# Patient Record
Sex: FEMALE | Race: WHITE | Employment: FULL TIME | URBAN - METROPOLITAN AREA
[De-identification: names, ages, dates, MRNs, and addresses within clinical notes are randomized per-mention and may not be internally consistent; named-entity substitution may affect disease eponyms.]

---

## 2017-03-27 RX ORDER — TOPIRAMATE 25 MG/1
TABLET ORAL
Qty: 210 TAB | Refills: 2 | Status: SHIPPED | OUTPATIENT
Start: 2017-03-27 | End: 2020-07-07 | Stop reason: SINTOL

## 2017-04-20 DIAGNOSIS — G43.711 INTRACTABLE CHRONIC MIGRAINE WITHOUT AURA AND WITH STATUS MIGRAINOSUS: ICD-10-CM

## 2017-04-20 RX ORDER — SUMATRIPTAN 100 MG/1
TABLET, FILM COATED ORAL
Qty: 15 TAB | Refills: 0 | Status: SHIPPED | OUTPATIENT
Start: 2017-04-20 | End: 2017-06-15 | Stop reason: SDUPTHER

## 2017-06-15 DIAGNOSIS — G43.711 INTRACTABLE CHRONIC MIGRAINE WITHOUT AURA AND WITH STATUS MIGRAINOSUS: ICD-10-CM

## 2017-06-16 RX ORDER — SUMATRIPTAN 100 MG/1
TABLET, FILM COATED ORAL
Qty: 15 TAB | Refills: 0 | Status: SHIPPED | OUTPATIENT
Start: 2017-06-16 | End: 2017-11-12 | Stop reason: SDUPTHER

## 2017-11-12 DIAGNOSIS — G43.711 INTRACTABLE CHRONIC MIGRAINE WITHOUT AURA AND WITH STATUS MIGRAINOSUS: ICD-10-CM

## 2017-11-13 RX ORDER — SUMATRIPTAN 100 MG/1
TABLET, FILM COATED ORAL
Qty: 15 TAB | Refills: 0 | Status: SHIPPED | OUTPATIENT
Start: 2017-11-13 | End: 2018-04-02 | Stop reason: SDUPTHER

## 2018-04-02 DIAGNOSIS — G43.711 INTRACTABLE CHRONIC MIGRAINE WITHOUT AURA AND WITH STATUS MIGRAINOSUS: ICD-10-CM

## 2018-04-02 RX ORDER — SUMATRIPTAN 100 MG/1
TABLET, FILM COATED ORAL
Qty: 15 TAB | Refills: 0 | Status: SHIPPED | OUTPATIENT
Start: 2018-04-02 | End: 2020-07-07

## 2019-03-05 ENCOUNTER — ED HISTORICAL/CONVERTED ENCOUNTER (OUTPATIENT)
Dept: OTHER | Age: 44
End: 2019-03-05

## 2019-10-08 ENCOUNTER — OP HISTORICAL/CONVERTED ENCOUNTER (OUTPATIENT)
Dept: OTHER | Age: 44
End: 2019-10-08

## 2020-05-20 ENCOUNTER — TELEPHONE (OUTPATIENT)
Dept: FAMILY MEDICINE CLINIC | Age: 45
End: 2020-05-20

## 2020-05-20 ENCOUNTER — VIRTUAL VISIT (OUTPATIENT)
Dept: FAMILY MEDICINE CLINIC | Age: 45
End: 2020-05-20

## 2020-05-20 VITALS — BODY MASS INDEX: 26.46 KG/M2 | WEIGHT: 155 LBS | HEIGHT: 64 IN

## 2020-05-20 DIAGNOSIS — I10 ESSENTIAL HYPERTENSION: ICD-10-CM

## 2020-05-20 DIAGNOSIS — R25.1 TREMOR: Primary | ICD-10-CM

## 2020-05-20 DIAGNOSIS — R00.2 PALPITATIONS: ICD-10-CM

## 2020-05-20 PROBLEM — E11.9 CONTROLLED TYPE 2 DIABETES MELLITUS WITHOUT COMPLICATION, WITHOUT LONG-TERM CURRENT USE OF INSULIN (HCC): Status: ACTIVE | Noted: 2020-05-20

## 2020-05-20 PROBLEM — F41.9 ANXIETY: Status: ACTIVE | Noted: 2020-05-20

## 2020-05-20 PROBLEM — G43.909 MIGRAINE WITHOUT STATUS MIGRAINOSUS, NOT INTRACTABLE: Status: ACTIVE | Noted: 2020-05-20

## 2020-05-20 PROBLEM — E78.49 OTHER HYPERLIPIDEMIA: Status: ACTIVE | Noted: 2020-05-20

## 2020-05-20 PROBLEM — K31.84 GASTROPARESIS: Status: ACTIVE | Noted: 2020-05-20

## 2020-05-20 RX ORDER — ATENOLOL 50 MG/1
50 TABLET ORAL DAILY
Qty: 90 TAB | Refills: 0 | Status: SHIPPED | OUTPATIENT
Start: 2020-05-20 | End: 2020-06-10

## 2020-05-20 NOTE — PROGRESS NOTES
Consent: Nany Hagan, who was seen by synchronous (real-time) audio-video technology, and/or her healthcare decision maker, is aware that this patient-initiated, Telehealth encounter on 5/20/2020 is a billable service, with coverage as determined by her insurance carrier. She is aware that she may receive a bill and has provided verbal consent to proceed: Yes. Assessment & Plan:   Diagnoses and all orders for this visit:    1. Tremor  -     REFERRAL TO NEUROLOGY    2. Essential hypertension  -     atenoloL (TENORMIN) 50 mg tablet; Take 1 Tab by mouth daily. 3. Palpitations  -     atenoloL (TENORMIN) 50 mg tablet; Take 1 Tab by mouth daily.  -     REFERRAL TO CARDIOLOGY      We will request records from previous PCP. Ms. Denice Randolhp has been given the following recommendations today due to her elevated BP reading: rescreen BP within a minimum of 2 weeks, lifestyle modifications to include: dietary sodium restriction and increase physical activity and anti-hypertensive pharmacologic therapy ordered. Follow-up and Dispositions    · Return in about 2 weeks (around 6/3/2020) for follow up, BP. I spent at least 15 minutes with this established patient, and >50% of the time was spent counseling and/or coordinating care regarding htn, palpitations  712  Subjective:   Nany Hagan is a 50 y.o. female who was seen for [address] and Hypertension (Blood pressure is running high)      1. Tremor  Reports tremor in both hands for past 4-5 months. Denies focal weakness, numbness, tingling. Has seen a neurologist in the past but does not want to follow up there. No treatments tried. Requests referral.    2. Essential hypertension  The patient presents today for HTN evaluation. Not on any BP medications at this time. Home BP readings range from systolic: 230 s 264 s/ diastolic 90 s -662 s. SIde effects of meds:  none  Patient trying to follow low salt diet. Associated symptoms include: palpitations.  No chest pain. Prior to Admission medications    Medication Sig Start Date End Date Taking? Authorizing Provider   esomeprazole magnesium (NEXIUM PO) Take 20 mg by mouth two (2) times a day. Yes Provider, Historical   linaclotide (LINZESS PO) Take  by mouth. Yes Provider, Historical   atenoloL (TENORMIN) 50 mg tablet Take 1 Tab by mouth daily. 5/20/20  Yes Renetta Wright MD     Allergies   Allergen Reactions    Morphine Unknown (comments)       Patient Active Problem List   Diagnosis Code    Migraine without status migrainosus, not intractable G43.909    Anxiety F41.9    Gastroparesis K31.84    Other hyperlipidemia E78.49    Controlled type 2 diabetes mellitus without complication, without long-term current use of insulin ([facility]) E11.9     Patient Active Problem List    Diagnosis Date Noted    Migraine without status migrainosus, not intractable 05/20/2020    Anxiety 05/20/2020    Gastroparesis 05/20/2020    Other hyperlipidemia 05/20/2020    Controlled type 2 diabetes mellitus without complication, without long-term current use of insulin ([facility] 05/20/2020     Current Outpatient Medications   Medication Sig Dispense Refill    esomeprazole magnesium (NEXIUM PO) Take 20 mg by mouth two (2) times a day.  linaclotide (LINZESS PO) Take  by mouth.  atenoloL (TENORMIN) 50 mg tablet Take 1 Tab by mouth daily. 90 Tab 0     Allergies   Allergen Reactions    Morphine Unknown (comments)     No past medical history on file. Past Surgical History:   Procedure Laterality Date    HX GASTRIC BYPASS  2015     No family history on file. Social History     Tobacco Use    Smoking status: Current Some Day Smoker    Smokeless tobacco: Never Used   Substance Use Topics    Alcohol use: Not Currently       Review of Systems   Constitutional: Negative. HENT: Negative. Eyes: Negative. Respiratory: Negative. Cardiovascular: Positive for palpitations. Gastrointestinal: Negative.     Genitourinary: Negative. Musculoskeletal: Negative. Skin: Negative. Neurological: Negative. Endo/Heme/Allergies: Negative. Psychiatric/Behavioral: Negative. Objective:   Vital Signs: (As obtained by patient/caregiver at home)  Visit Vitals  Ht 5' 4\" (1.626 m)   Wt 155 lb (70.3 kg)   LMP 10/20/2019   BMI 26.61 kg/m²        [INSTRUCTIONS:  \"[x]\" Indicates a positive item  \"[]\" Indicates a negative item  -- DELETE ALL ITEMS NOT EXAMINED]    Constitutional: [x] Appears well-developed and well-nourished [x] No apparent distress      [] Abnormal -     Mental status: [x] Alert and awake  [x] Oriented to person/place/time [x] Able to follow commands    [] Abnormal -     Eyes:   EOM    [x]  Normal    [] Abnormal -   Sclera  [x]  Normal    [] Abnormal -          Discharge [x]  None visible   [] Abnormal -     HENT: [x] Normocephalic, atraumatic  [] Abnormal -   [x] Mouth/Throat: Mucous membranes are moist    External Ears [x] Normal  [] Abnormal -    Neck: [x] No visualized mass [] Abnormal -     Pulmonary/Chest: [x] Respiratory effort normal   [x] No visualized signs of difficulty breathing or respiratory distress        [] Abnormal -      Musculoskeletal:   [x] Normal gait with no signs of ataxia         [x] Normal range of motion of neck        [] Abnormal -     Neurological:        [x] No Facial Asymmetry (Cranial nerve 7 motor function) (limited exam due to video visit)          [x] No gaze palsy        [] Abnormal -          Skin:        [x] No significant exanthematous lesions or discoloration noted on facial skin         [] Abnormal -            Psychiatric:       [x] Normal Affect [] Abnormal -        [x] No Hallucinations    Other pertinent observable physical exam findings:-        We discussed the expected course, resolution and complications of the diagnosis(es) in detail. Medication risks, benefits, costs, interactions, and alternatives were discussed as indicated.   I advised her to contact the office if her condition worsens, changes or fails to improve as anticipated. She expressed understanding with the diagnosis(es) and plan. Sapphire Leos is a 50 y.o. female being evaluated by a video visit encounter for concerns as above. A caregiver was present when appropriate. Due to this being a TeleHealth encounter (During Replaced by [facility] public health emergency), evaluation of the following organ systems was limited: Vitals/Constitutional/EENT/Resp/CV/GI//MS/Neuro/Skin/Heme-Lymph-Imm. Pursuant to the emergency declaration under the [facility] waiver authority and the [employer] Act, this Virtual  Visit was conducted, with patient's (and/or legal guardian's) consent, to reduce the patient's risk of exposure to COVID-19 and provide necessary medical care. Services were provided through a video synchronous discussion virtually to substitute for in-person clinic visit. Patient was located at her home. I was at home while conducting this encounter.        Navin Bustos MD

## 2020-05-20 NOTE — PROGRESS NOTES
Ga Martinez is a 50 y.o. female      Chief Complaint   Patient presents with    Establish Care    Hypertension     Blood pressure is running high         1. Have you been to the ER, urgent care clinic since your last visit? Hospitalized since your last visit? no      2. Have you seen or consulted any other health care providers outside of the [address] [place] [address] since your last visit? Include any pap smears or colon screening.    Yes Dr Amy Colemna

## 2020-05-21 ENCOUNTER — TELEPHONE (OUTPATIENT)
Dept: CARDIOLOGY CLINIC | Age: 45
End: 2020-05-21

## 2020-05-21 NOTE — TELEPHONE ENCOUNTER
Patient was referred to Dr Elizabeth Toro.   Ref by Dr Kala Castellon [phone]  Dx: hypertension, high heart rate     Phone: [phone]

## 2020-06-05 ENCOUNTER — OFFICE VISIT (OUTPATIENT)
Dept: CARDIOLOGY CLINIC | Age: 45
End: 2020-06-05

## 2020-06-05 VITALS
DIASTOLIC BLOOD PRESSURE: 88 MMHG | BODY MASS INDEX: 31.24 KG/M2 | HEART RATE: 76 BPM | HEIGHT: 64 IN | SYSTOLIC BLOOD PRESSURE: 146 MMHG | WEIGHT: 183 LBS | OXYGEN SATURATION: 98 %

## 2020-06-05 DIAGNOSIS — E78.49 OTHER HYPERLIPIDEMIA: ICD-10-CM

## 2020-06-05 DIAGNOSIS — R00.0 ELEVATED PULSE RATE: Primary | ICD-10-CM

## 2020-06-05 DIAGNOSIS — I10 HYPERTENSION, UNSPECIFIED TYPE: Primary | ICD-10-CM

## 2020-06-05 DIAGNOSIS — I10 HYPERTENSION, UNSPECIFIED TYPE: ICD-10-CM

## 2020-06-05 DIAGNOSIS — E11.9 CONTROLLED TYPE 2 DIABETES MELLITUS WITHOUT COMPLICATION, WITHOUT LONG-TERM CURRENT USE OF INSULIN (HCC): ICD-10-CM

## 2020-06-05 DIAGNOSIS — R00.0 ELEVATED PULSE RATE: ICD-10-CM

## 2020-06-05 DIAGNOSIS — R00.0 TACHYCARDIA: ICD-10-CM

## 2020-06-05 RX ORDER — AMLODIPINE BESYLATE 5 MG/1
5 TABLET ORAL DAILY
Qty: 30 TAB | Refills: 4 | Status: SHIPPED | OUTPATIENT
Start: 2020-06-05 | End: 2020-07-08

## 2020-06-05 RX ORDER — TRAMADOL HYDROCHLORIDE 50 MG/1
50 TABLET ORAL
COMMUNITY
End: 2020-07-07

## 2020-06-05 NOTE — PATIENT INSTRUCTIONS
#1) we will perform a Lexiscan Cardiolite to look for any ischemia 2) echocardiogram to look at LV function 3) begin Norvasc 5 mg a day to take in the evening. Follow blood pressure at home should it remain persistently above 140/90 call 4) we will obtain a sleep evaluation for possible sleep apnea 5) we will obtain a cholesterol profile 6) 48 hotter monitor for rhythm issues 7) follow-up with me in 6 weeks

## 2020-06-05 NOTE — PROGRESS NOTES
ICD-10-CM ICD-9-CM   1. Hypertension, unspecified type I10 401.9   2. Tachycardia R00.0 785.0   3. Elevated pulse rate R00.0 785.0   4. Controlled type 2 diabetes mellitus without complication, without long-term current use of insulin (HCC) E11.9 250.00   5. Other hyperlipidemia E78.49 272.4            Anupama Maldonado is a 50 y.o. female with  referred for hypertension, chest discomfort, tachycardia. The patient denies chest pain/ shortness of breath, orthopnea, PND, LE edema, palpitations, syncope, presyncope or fatigue. Cardiac risk factors: dyslipidemia, obesity, hypertension, stress, post-menopausal  I have personally obtained the history from the patient. HISTORY OF PRESENTING ILLNESS      Ms. Keyla Silverio is a pleasant lady who has been under significant amount of stress in the past.  She has gained about 30 pounds over the years and back in 2015 and saw a cardiologist and had a work-up that was negative including a negative cardiac catheterization. More recently she has had issues with hypertension. Back in 2015 when she weight loss her blood pressure was good. She was placed on atenolol for her hypertension but she refused to take it. ATTENTION:   This medical record was transcribed using an electronic medical records/speech recognition system. Although proofread, it may and can contain electronic, spelling and other errors. Corrections may be executed at a later time. Please feel free to contact us for any clarifications as needed.        ACTIVE PROBLEM LIST     Patient Active Problem List    Diagnosis Date Noted    Migraine without status migrainosus, not intractable 05/20/2020    Anxiety 05/20/2020    Gastroparesis 05/20/2020    Other hyperlipidemia 05/20/2020    Controlled type 2 diabetes mellitus without complication, without long-term current use of insulin ([facility] 05/20/2020    Cellulitis and abscess of neck 12/03/2016    Controlled type 2 diabetes mellitus without complication ([facility] 97/25/7675    Sepsis [facility] 12/03/2016    Elevated pulse rate 08/31/2015    Chest pain with normal coronary angiography 07/28/2015    Morbid obesity with BMI of 45.0-49.9, adult ([facility] 07/28/2015    TMJ tenderness 06/01/2015    Sinusitis 06/01/2015    New daily persistent headache 06/01/2015    Foot pain, bilateral 04/08/2015    Unspecified hereditary and idiopathic peripheral neuropathy 04/08/2015    Chronic back pain 03/13/2014    Obesity 03/13/2014    Lyme disease 03/13/2014    Anxiety 03/13/2014    Diverticular disease 03/13/2014    GERD (gastroesophageal reflux disease) 03/13/2014    SOB (shortness of breath) 03/12/2014           PAST MEDICAL HISTORY     Past Medical History:   Diagnosis Date    Arrhythmia     PAC    Arthritis     Chest pain     Diabetes ([facility]     GERD (gastroesophageal reflux disease)     H/O seasonal allergies     Ill-defined condition     \"twisted\" kidney right    Ill-defined condition     cellulitis    Lyme disease     Obesity     Plantar fasciitis     PUD (peptic ulcer disease)     Trigger finger, right index finger 2/2016    Vitamin D deficiency            PAST SURGICAL HISTORY     Past Surgical History:   Procedure Laterality Date    HX GASTRIC BYPASS  2015    HX GI      HX HEART CATHETERIZATION  7/28/15    HX ORTHOPAEDIC  7-30-15    Rt. rotator cuff repair     HX TONSILLECTOMY      UT COLSC FLX W/NDSC US XM RCTM ET AL LMTD&ADJ STRUX  2011          ALLERGIES     Allergies   Allergen Reactions    Morphine Anaphylaxis     July 5, 2016 administered after surgery for pain.  Morphine Unknown (comments)    Other Medication Palpitations     Peripheral Block patient noted difficulty breathing and palpitations.           FAMILY HISTORY     Family History   Problem Relation Age of Onset    Cancer Paternal Aunt         colon polyps    Cancer Paternal Uncle         colon cancer    Stroke Mother     Heart Disease Mother     Elevated Lipids Mother     Cancer Father         prostate    Heart Disease Father         death by MI    Coronary Artery Disease Other         mother  64, father age 67 of MI    Other Brother         Passed while sleeping at 64, neck blockages and legs, hx of vascular surg    Hypertension Brother     Diabetes Brother     Anesth Problems Neg Hx     negative for cardiac disease       SOCIAL HISTORY     Social History     Socioeconomic History    Marital status: SINGLE     Spouse name: Not on file    Number of children: Not on file    Years of education: Not on file    Highest education level: Not on file   Tobacco Use    Smoking status: Current Some Day Smoker     Packs/day: 0.50    Smokeless tobacco: Never Used   Substance and Sexual Activity    Alcohol use: Yes     Comment: rare    Drug use: Never    Sexual activity: Yes     Comment: not sexually active the past 4 years since     Social History Narrative    ** Merged History Encounter **              MEDICATIONS     Current Outpatient Medications   Medication Sig    traMADoL (ULTRAM) 50 mg tablet Take 50 mg by mouth every six (6) hours as needed for Pain.  amLODIPine (NORVASC) 5 mg tablet Take 1 Tab by mouth daily.  SUMAtriptan (IMITREX) 100 mg tablet TAKE 1 TABLET BY MOUTH 1 TIME AS NEEDED AT THE START OF THE HEADACHE. MAY REPEAT IN 2 HOURS IF THE HEADACHE PERSISTS    HYDROcodone-acetaminophen (NORCO) 5-325 mg per tablet Take 1 Tab by mouth every twelve (12) hours as needed for Pain.  cyclobenzaprine (FLEXERIL) 5 mg tablet Take 10 mg by mouth nightly as needed for Muscle Spasm(s).  Omeprazole delayed release (PRILOSEC D/R) 20 mg tablet Take 20 mg by mouth two (2) times a day.  esomeprazole magnesium (NEXIUM PO) Take 20 mg by mouth two (2) times a day.  linaclotide (LINZESS PO) Take  by mouth.  atenoloL (TENORMIN) 50 mg tablet Take 1 Tab by mouth daily.     SUMAtriptan (IMITREX) 100 mg tablet TAKE 1 TABLET BY MOUTH 1 TIME AS NEEDED AT THE START OF THE HEADACHE. MAY REPEAT IN 2 HOURS IF THE HEADACHE PERSISTS    topiramate (TOPAMAX) 25 mg tablet TAKE 1 TABLET EVERY NIGHT AT BEDTIME WEEK 1, 1 TWICE DAILY WEEK 2, 1 EVERY MORNING AND 2 EVERY NIGHT AT BEDTIME WEEK 3, THEN 2 TWICE DAILY    omeprazole (PRILOSEC) 20 mg capsule Take 20 mg by mouth daily as needed (if heartburn persists after first dose).  gabapentin (NEURONTIN) 100 mg capsule Take 400 mg by mouth daily as needed for Pain.  LORazepam (ATIVAN) 0.5 mg tablet Take 0.5 mg by mouth two (2) times daily as needed for Anxiety.  traZODone (DESYREL) 100 mg tablet Take 200 mg by mouth nightly as needed for Sleep. No current facility-administered medications for this visit. I have reviewed the nurses notes, vitals, problem list, allergy list, medical history, family, social history and medications. REVIEW OF SYMPTOMS      General: Pt denies excessive weight gain or loss. Pt is able to conduct ADL's  HEENT: Denies blurred vision, headaches, hearing loss, epistaxis and difficulty swallowing. Respiratory: Denies cough, congestion, shortness of breath, BAUER, wheezing or stridor. Cardiovascular: Denies precordial pain, palpitations, edema or PND  Gastrointestinal: Denies poor appetite, indigestion, abdominal pain or blood in stool  Genitourinary: Denies hematuria, dysuria, increased urinary frequency  Musculoskeletal: Denies joint pain or swelling from muscles or joints  Neurologic: Denies tremor, paresthesias, headache, or sensory motor disturbance  Psychiatric: Denies confusion, insomnia, depression  Integumentray: Denies rash, itching or ulcers. Hematologic: Denies easy bruising, bleeding     PHYSICAL EXAMINATION      Vitals:    06/05/20 1052   BP: 146/88   Pulse: 76   SpO2: 98%   Weight: 183 lb (83 kg)   Height: 5' 4\" (1.626 m)     General: Well developed, in no acute distress.   Appears deconditioned  HEENT: No jaundice, oral mucosa moist, no oral ulcers  Neck: Supple, no stiffness, no lymphadenopathy, supple  Heart:  Normal S1/S2 negative S3 or S4. Regular, no murmur, gallop or rub, no jugular venous distention  Respiratory: Clear bilaterally x 4, no wheezing or rales  Abdomen:   Soft, non-tender, bowel sounds are active.   Extremities:  No edema, normal cap refill, no cyanosis. Musculoskeletal: No clubbing, no deformities  Neuro: A&Ox3, speech clear, gait stable, cooperative, no focal neurologic deficits  Skin: Skin color is normal. No rashes or lesions. Non diaphoretic, moist.  Vascular: 2+ pulses symmetric in all extremities        EK2020: Normal sinus rhythm     DIAGNOSTIC DATA       1. Echo   14- EF 55-60%  7/22/15- EF 55-60%    2. Stress Test  14- Lexiscan Cardiolite- no ischemia, EF 76%  7/15/15- Lexiscan Cardiolite-Myocardial perfusion imaging is abnormal: there is a large area of fixed perfusion defect in the lateral wall with significant attenuation artifact. Overall left ventricular systolic function was normal. Compared to the study from 2014, the current study reveals new perfusion defect. 3. Holter  7/16/15- NSR    4. Cardiac Cath  7/28/15- No CAD    5.  Lipids  7/27/15- , HDL 42, ,        Lab Results   Component Value Date/Time    WBC 25.0 (H) 2016 04:02 AM    Hemoglobin (POC) 14.3 2016 08:21 AM    HGB 13.0 2016 04:02 AM    Hematocrit (POC) 42 2016 08:21 AM    HCT 37.8 2016 04:02 AM    PLATELET 514  04:02 AM    MCV 91.5 2016 04:02 AM      Lab Results   Component Value Date/Time    Sodium 137 2016 04:02 AM    Potassium 3.5 2016 04:02 AM    Chloride 104 2016 04:02 AM    CO2 24 2016 04:02 AM    Anion gap 9 2016 04:02 AM    Glucose 162 (H) 2016 04:02 AM    BUN 8 2016 04:02 AM    Creatinine 0.61 2016 04:02 AM    BUN/Creatinine ratio 13 2016 04:02 AM    GFR est AA >60 2016 04:02 AM    GFR est non-AA >60 2016 04:02 AM    Calcium 8.4 (L) 12/04/2016 04:02 AM    Bilirubin, total <0.2 07/27/2015 08:03 AM    Alk. phosphatase 79 07/27/2015 08:03 AM    Protein, total 6.6 07/27/2015 08:03 AM    Albumin 4.1 07/27/2015 08:03 AM    A-G Ratio 1.6 07/27/2015 08:03 AM    ALT (SGPT) 21 07/27/2015 08:03 AM           ASSESSMENT/RECOMMENDATIONS:.      1.  Hypertension  -Blood pressure is elevated today I encouraged her to eat a low-sodium diet and probably needs to work on stress. Seems like this is the biggest issue and she is asking for Ativan from her primary care.  -I have placed her on amlodipine at 5 mg a day we will check her blood pressure returns for cardiac testing  2. Chest discomfort  -She describes discomfort in the left upper side of her chest at times. Seems not to be with exertion or no exertion. I do favor since she does appear to be deconditioned to check echocardiogram as well as a Tye Carline was done in the past.  3.  Dyslipidemia her last LDL was in 2015 was 143  -Currently not on a statin but prior to that I think we should check a fasting lipid profile  3. Tachycardia  -We will place a 48-hour Holter monitor on her to look for any arrhythmias  4. Early morning fatigue and weight gain  -He was to go forward with a sleep evaluation she is in agreement    Follow-up with me in 6 weeks      Orders Placed This Encounter    LIPID PANEL     Standing Status:   Future     Standing Expiration Date:   6/5/2021    HEPATIC FUNCTION PANEL     Standing Status:   Future     Standing Expiration Date:   6/5/2021    AMB POC EKG ROUTINE W/ 12 LEADS, INTER & REP     Order Specific Question:   Reason for Exam:     Answer:   HTN, Tachy    traMADoL (ULTRAM) 50 mg tablet     Sig: Take 50 mg by mouth every six (6) hours as needed for Pain.  amLODIPine (NORVASC) 5 mg tablet     Sig: Take 1 Tab by mouth daily.      Dispense:  30 Tab     Refill:  4          Follow-up and Dispositions  ·   Return in about 6 months (around 12/5/2020). I have discussed the diagnosis with  Miki Lesches and the intended plan as seen in the above orders. Questions were answered concerning future plans. I have discussed medication side effects and warnings with the patient as well. Thank you,  Javier Patterson MD for involving me in the care of  Miki Lesches. Please do not hesitate to contact me for further questions/concerns. Lonny Castillo MD, [address] [place] [address] Rd[address]      [facility], [address] [place] [address], [address] [place] [address] Nita Milan.      [phone] / [phone] Fax

## 2020-06-05 NOTE — PROGRESS NOTES
Visit Vitals  /88 (BP 1 Location: Left arm, BP Patient Position: Sitting)   Pulse 76   Ht 5' 4\" (1.626 m)   Wt 183 lb (83 kg)   SpO2 98%   BMI 31.41 kg/m²         Smoker    Fam hx Dad-CAD, MI    157/95 last night   Pulse up to 100 resting  Elev a month or 2   Ringing in ears and hears heart beat in ears    Seen cardiology before: Has seen one at [facility] for same sympt.  Nuclear stress test done before, 6 years ago    Chest pain: every now and then, more so a tightness  Shortness of breath: yes  Edema: some, she eats A LOT of salt  Palpitations: racing  Dizziness: sometimes    Hasn't started atenolol, scared of dosing

## 2020-06-08 ENCOUNTER — TELEPHONE (OUTPATIENT)
Dept: NEUROLOGY | Age: 45
End: 2020-06-08

## 2020-06-08 ENCOUNTER — VIRTUAL VISIT (OUTPATIENT)
Dept: FAMILY MEDICINE CLINIC | Age: 45
End: 2020-06-08

## 2020-06-08 DIAGNOSIS — F41.9 ANXIETY: Primary | ICD-10-CM

## 2020-06-08 RX ORDER — BUSPIRONE HYDROCHLORIDE 7.5 MG/1
7.5 TABLET ORAL 3 TIMES DAILY
Qty: 90 TAB | Refills: 1 | Status: SHIPPED | OUTPATIENT
Start: 2020-06-08 | End: 2020-07-08 | Stop reason: SDUPTHER

## 2020-06-08 RX ORDER — LORAZEPAM 0.5 MG/1
0.5 TABLET ORAL
Status: CANCELLED | OUTPATIENT
Start: 2020-06-08

## 2020-06-08 NOTE — PROGRESS NOTES
Consent: Raul Madrid, who was seen by synchronous (real-time) audio-video technology, and/or her healthcare decision maker, is aware that this patient-initiated, Telehealth encounter on 6/8/2020 is a billable service, with coverage as determined by her insurance carrier. She is aware that she may receive a bill and has provided verbal consent to proceed: Yes. Assessment & Plan:   Diagnoses and all orders for this visit:    1. Anxiety  -     busPIRone (BUSPAR) 7.5 mg tablet; Take 1 Tab by mouth three (3) times daily. Indications: repeated episodes of anxiety          Follow-up and Dispositions    · Return in about 1 month (around 7/8/2020) for follow up, med check. I spent at least 15 minutes with this established patient, and >50% of the time was spent counseling and/or coordinating care regarding anxiety  712  Subjective:   Raul Madrid is a 50 y.o. female who was seen for Medication Refill (Pt requesting medication for xanax or lorazepam)      1. Anxiety  Raul Madrid is a 50 y.o. female who presents for new evaluation and treatment of of anxiety    ESTABLISHED ANXIETY DIAGNOSIS?: none   PROMINENT SYMPTOMS: palpitations, racing thoughts, feelings of losing control. TRUE PANIC ATTACKS?: occur in addition to generalized anxiety   FREQUENCY OF ANXIETY:  Daily, all the time   TRIGGERS: There are no apparent triggers that increase the anxiety. CURRENT TREATMENT: none  Reported side effects from the medication(s) above include: none. PERTINENT HISTORY: negative for bipolar disorder,  hyperthyroidism, obsessive- compulsive disorder, post traumatic stress disorder, drug use and alcohol use   FAMILY HISTORY:negative for psychiatric disorders. Prior to Admission medications    Medication Sig Start Date End Date Taking? Authorizing Provider   busPIRone (BUSPAR) 7.5 mg tablet Take 1 Tab by mouth three (3) times daily.  Indications: repeated episodes of anxiety 6/8/20  Yes Spencer Wood MD traMADoL (ULTRAM) 50 mg tablet Take 50 mg by mouth every six (6) hours as needed for Pain. Yes Provider, Historical   amLODIPine (NORVASC) 5 mg tablet Take 1 Tab by mouth daily. 6/5/20  Yes Sabi Castillo MD   linaclotide (LINZESS PO) Take  by mouth. Yes Provider, Historical   SUMAtriptan (IMITREX) 100 mg tablet TAKE 1 TABLET BY MOUTH 1 TIME AS NEEDED AT THE START OF THE HEADACHE. MAY REPEAT IN 2 HOURS IF THE HEADACHE PERSISTS 4/26/18  Yes Monica Morrow MD   SUMAtriptan (IMITREX) 100 mg tablet TAKE 1 TABLET BY MOUTH 1 TIME AS NEEDED AT THE START OF THE HEADACHE. MAY REPEAT IN 2 HOURS IF THE HEADACHE PERSISTS 4/2/18  Yes Monica Morrow MD   HYDROcodone-acetaminophen (NORCO) 5-325 mg per tablet Take 1 Tab by mouth every twelve (12) hours as needed for Pain. Yes Provider, Historical   omeprazole (PRILOSEC) 20 mg capsule Take 20 mg by mouth daily as needed (if heartburn persists after first dose). Yes Provider, Historical   cyclobenzaprine (FLEXERIL) 5 mg tablet Take 10 mg by mouth nightly as needed for Muscle Spasm(s). Yes Provider, Historical   Omeprazole delayed release (PRILOSEC D/R) 20 mg tablet Take 20 mg by mouth two (2) times a day. Yes Provider, Historical   esomeprazole magnesium (NEXIUM PO) Take 20 mg by mouth two (2) times a day. Provider, Historical   atenoloL (TENORMIN) 50 mg tablet Take 1 Tab by mouth daily. 5/20/20   Spencer Wood MD   topiramate (TOPAMAX) 25 mg tablet TAKE 1 TABLET EVERY NIGHT AT BEDTIME WEEK 1, 1 TWICE DAILY WEEK 2, 1 EVERY MORNING AND 2 EVERY NIGHT AT BEDTIME WEEK 3, THEN 2 TWICE DAILY 3/27/17   Monica Morrow MD   gabapentin (NEURONTIN) 100 mg capsule Take 400 mg by mouth daily as needed for Pain. 1/25/16   Provider, Historical   LORazepam (ATIVAN) 0.5 mg tablet Take 0.5 mg by mouth two (2) times daily as needed for Anxiety. Provider, Historical   traZODone (DESYREL) 100 mg tablet Take 200 mg by mouth nightly as needed for Sleep.     Provider, Historical     Allergies   Allergen Reactions    Morphine Anaphylaxis     July 5, 2016 administered after surgery for pain.  Morphine Unknown (comments)    Other Medication Palpitations     Peripheral Block patient noted difficulty breathing and palpitations.        Patient Active Problem List   Diagnosis Code    SOB (shortness of breath) R06.02    Chronic back pain M54.9, G89.29    Obesity E66.9    Lyme disease A69.20    Anxiety F41.9    Diverticular disease K57.90    GERD (gastroesophageal reflux disease) K21.9    Foot pain, bilateral M79.671, M79.672    Unspecified hereditary and idiopathic peripheral neuropathy G60.9    TMJ tenderness M26.629    Sinusitis J32.9    New daily persistent headache G44.52    Chest pain with normal coronary angiography R07.9    Morbid obesity with BMI of 45.0-49.9, adult ([facility]) E66.01, Z68.42    Elevated pulse rate R00.0    Cellulitis and abscess of neck L03.221, L02.11    Controlled type 2 diabetes mellitus without complication ([facility]) U47.8    Sepsis ([facility]) A41.9    Migraine without status migrainosus, not intractable G43.909    Anxiety F41.9    Gastroparesis K31.84    Other hyperlipidemia E78.49    Controlled type 2 diabetes mellitus without complication, without long-term current use of insulin ([facility]) E11.9     Patient Active Problem List    Diagnosis Date Noted    Migraine without status migrainosus, not intractable 05/20/2020    Anxiety 05/20/2020    Gastroparesis 05/20/2020    Other hyperlipidemia 05/20/2020    Controlled type 2 diabetes mellitus without complication, without long-term current use of insulin ([facility] [address] 05/20/2020    Cellulitis and abscess of neck 12/03/2016    Controlled type 2 diabetes mellitus without complication ([address] 72/00/3907    Sepsis ([facility] [address] 12/03/2016    Elevated pulse rate 08/31/2015    Chest pain with normal coronary angiography 07/28/2015    Morbid obesity with BMI of 45.0-49.9, adult ([facility] [address] 07/28/2015    TMJ tenderness 06/01/2015    Sinusitis 06/01/2015    New daily persistent headache 06/01/2015    Foot pain, bilateral 04/08/2015    Unspecified hereditary and idiopathic peripheral neuropathy 04/08/2015    Chronic back pain 03/13/2014    Obesity 03/13/2014    Lyme disease 03/13/2014    Anxiety 03/13/2014    Diverticular disease 03/13/2014    GERD (gastroesophageal reflux disease) 03/13/2014    SOB (shortness of breath) 03/12/2014     Current Outpatient Medications   Medication Sig Dispense Refill    busPIRone (BUSPAR) 7.5 mg tablet Take 1 Tab by mouth three (3) times daily. Indications: repeated episodes of anxiety 90 Tab 1    traMADoL (ULTRAM) 50 mg tablet Take 50 mg by mouth every six (6) hours as needed for Pain.  amLODIPine (NORVASC) 5 mg tablet Take 1 Tab by mouth daily. 30 Tab 4    linaclotide (LINZESS PO) Take  by mouth.  SUMAtriptan (IMITREX) 100 mg tablet TAKE 1 TABLET BY MOUTH 1 TIME AS NEEDED AT THE START OF THE HEADACHE. MAY REPEAT IN 2 HOURS IF THE HEADACHE PERSISTS 15 Tab 2    SUMAtriptan (IMITREX) 100 mg tablet TAKE 1 TABLET BY MOUTH 1 TIME AS NEEDED AT THE START OF THE HEADACHE. MAY REPEAT IN 2 HOURS IF THE HEADACHE PERSISTS 15 Tab 0    HYDROcodone-acetaminophen (NORCO) 5-325 mg per tablet Take 1 Tab by mouth every twelve (12) hours as needed for Pain.  omeprazole (PRILOSEC) 20 mg capsule Take 20 mg by mouth daily as needed (if heartburn persists after first dose).  cyclobenzaprine (FLEXERIL) 5 mg tablet Take 10 mg by mouth nightly as needed for Muscle Spasm(s).  Omeprazole delayed release (PRILOSEC D/R) 20 mg tablet Take 20 mg by mouth two (2) times a day.  esomeprazole magnesium (NEXIUM PO) Take 20 mg by mouth two (2) times a day.  atenoloL (TENORMIN) 50 mg tablet Take 1 Tab by mouth daily.  90 Tab 0    topiramate (TOPAMAX) 25 mg tablet TAKE 1 TABLET EVERY NIGHT AT BEDTIME WEEK 1, 1 TWICE DAILY WEEK 2, 1 EVERY MORNING AND 2 EVERY NIGHT AT BEDTIME WEEK 3, THEN 2 TWICE DAILY 210 Tab 2    gabapentin (NEURONTIN) 100 mg capsule Take 400 mg by mouth daily as needed for Pain.  LORazepam (ATIVAN) 0.5 mg tablet Take 0.5 mg by mouth two (2) times daily as needed for Anxiety.  traZODone (DESYREL) 100 mg tablet Take 200 mg by mouth nightly as needed for Sleep. Allergies   Allergen Reactions    Morphine Anaphylaxis     2016 administered after surgery for pain.  Morphine Unknown (comments)    Other Medication Palpitations     Peripheral Block patient noted difficulty breathing and palpitations.      Past Medical History:   Diagnosis Date    Arrhythmia     PAC    Arthritis     Chest pain     Diabetes [facility] [address]     GERD (gastroesophageal reflux disease)     H/O seasonal allergies     Ill-defined condition     \"twisted\" kidney right    Ill-defined condition     cellulitis    Lyme disease     Obesity     Plantar fasciitis     PUD (peptic ulcer disease)     Trigger finger, right index finger 2016    Vitamin D deficiency      Past Surgical History:   Procedure Laterality Date    HX GASTRIC BYPASS      HX GI      HX HEART CATHETERIZATION  7/28/15    HX ORTHOPAEDIC  7-30-15    Rt. rotator cuff repair     HX TONSILLECTOMY      TN COLSC FLX W/NDSC US XM RCTM ET AL LMTD&ADJ [facility]       Family History   Problem Relation Age of Onset    Cancer Paternal Aunt         colon polyps    Cancer Paternal Uncle         colon cancer    Stroke Mother     Heart Disease Mother     Elevated Lipids Mother     Cancer Father         prostate    Heart Disease Father         death by MI    Coronary Artery Disease Other         mother  64, father age 67 of MI    Other Brother         Passed while sleeping at 64, neck blockages and legs, hx of vascular surg    Hypertension Brother     Diabetes Brother     Anesth Problems Neg Hx      Social History     Tobacco Use    Smoking status: Current Some Day Smoker     Packs/day: 0.50    Smokeless tobacco: Never Used   Substance Use Topics    Alcohol use: Yes     Comment: rare       Review of Systems   Constitutional: Negative. HENT: Negative. Eyes: Negative. Respiratory: Negative. Cardiovascular: Negative. Gastrointestinal: Negative. Genitourinary: Negative. Musculoskeletal: Positive for myalgias. Skin: Negative. Neurological: Negative. Endo/Heme/Allergies: Negative. Psychiatric/Behavioral: Negative for suicidal ideas. The patient is nervous/anxious. Objective:   Vital Signs: (As obtained by patient/caregiver at home)  There were no vitals taken for this visit.      [INSTRUCTIONS:  \"[x]\" Indicates a positive item  \"[]\" Indicates a negative item  -- DELETE ALL ITEMS NOT EXAMINED]    Constitutional: [x] Appears well-developed and well-nourished [x] No apparent distress      [] Abnormal -     Mental status: [x] Alert and awake  [x] Oriented to person/place/time [x] Able to follow commands    [] Abnormal -     Eyes:   EOM    [x]  Normal    [] Abnormal -   Sclera  [x]  Normal    [] Abnormal -          Discharge [x]  None visible   [] Abnormal -     HENT: [x] Normocephalic, atraumatic  [] Abnormal -   [x] Mouth/Throat: Mucous membranes are moist    External Ears [x] Normal  [] Abnormal -    Neck: [x] No visualized mass [] Abnormal -     Pulmonary/Chest: [x] Respiratory effort normal   [x] No visualized signs of difficulty breathing or respiratory distress        [] Abnormal -      Musculoskeletal:   [x] Normal gait with no signs of ataxia         [x] Normal range of motion of neck        [] Abnormal -     Neurological:        [x] No Facial Asymmetry (Cranial nerve 7 motor function) (limited exam due to video visit)          [x] No gaze palsy        [] Abnormal -          Skin:        [x] No significant exanthematous lesions or discoloration noted on facial skin         [] Abnormal -            Psychiatric:       [x] Normal Affect [] Abnormal -        [x] No Hallucinations    Other pertinent observable physical exam findings:-        We discussed the expected course, resolution and complications of the diagnosis(es) in detail. Medication risks, benefits, costs, interactions, and alternatives were discussed as indicated. I advised her to contact the office if her condition worsens, changes or fails to improve as anticipated. She expressed understanding with the diagnosis(es) and plan. Adán Franco is a 50 y.o. female being evaluated by a video visit encounter for concerns as above. A caregiver was present when appropriate. Due to this being a TeleHealth encounter (During [facility]-33 public health emergency), evaluation of the following organ systems was limited: Vitals/Constitutional/EENT/Resp/CV/GI//MS/Neuro/Skin/Heme-Lymph-Imm. Pursuant to the emergency declaration under the [facility], 1135 waiver authority and the [employer] General Act, this Virtual  Visit was conducted, with patient's (and/or legal guardian's) consent, to reduce the patient's risk of exposure to COVID-19 and provide necessary medical care. Services were provided through a video synchronous discussion virtually to substitute for in-person clinic visit. Patient was located at her home. I was at home while conducting this encounter.        Lv Ceballos MD

## 2020-06-08 NOTE — PROGRESS NOTES
Identified pt with two pt identifiers(name and ). Reviewed record in preparation for visit and have obtained necessary documentation. Chief Complaint   Patient presents with    Medication Refill     Pt requesting medication for xanax or lorazepam        Health Maintenance Due   Topic    Pneumococcal 0-64 years (1 of 1 - PPSV23)    Foot Exam Q1     MICROALBUMIN Q1     Eye Exam Retinal or Dilated     DTaP/Tdap/Td series (1 - Tdap)    PAP AKA CERVICAL CYTOLOGY     Lipid Screen     A1C test (Diabetic or Prediabetic)        Coordination of Care Questionnaire:  :   1) Have you been to an emergency room, urgent care, or hospitalized since your last visit? If yes, where when, and reason for visit? Yes, cardiologist      2. Have seen or consulted any other health care provider since your last visit? If yes, where when, and reason for visit?  no        Patient is accompanied by self  I have received verbal consent from Erum Ceballos to discuss any/all medical information while they are present in the room.

## 2020-06-08 NOTE — TELEPHONE ENCOUNTER
----- Message from Minda Montoya sent at 6/8/2020 11:00 AM EDT -----  Regarding: FW: Dr. Lisa Kirby    ----- Message -----  From: Eliecer Miller  Sent: 6/8/2020   9:33 AM EDT  To: Kathryn Martínez [facility]  Subject: Dr. Shayla Zuluaga returning a call regarding r/s her appt.  Contact is [phone]

## 2020-06-09 NOTE — TELEPHONE ENCOUNTER
Wednesday, Shalonda 10, 2020 09:00 AM Patient Liang Malcolm 1975 ([address]) #[id number] Department [facility] OFFICE-[place] Appointment type New Patient Provider [address] [place] [address]

## 2020-06-10 ENCOUNTER — OFFICE VISIT (OUTPATIENT)
Dept: NEUROLOGY | Age: 45
End: 2020-06-10

## 2020-06-10 VITALS
DIASTOLIC BLOOD PRESSURE: 68 MMHG | OXYGEN SATURATION: 99 % | HEIGHT: 64 IN | WEIGHT: 182.2 LBS | BODY MASS INDEX: 31.1 KG/M2 | HEART RATE: 72 BPM | SYSTOLIC BLOOD PRESSURE: 138 MMHG | TEMPERATURE: 98.1 F | RESPIRATION RATE: 16 BRPM

## 2020-06-10 DIAGNOSIS — R25.1 TREMOR: ICD-10-CM

## 2020-06-10 DIAGNOSIS — G43.711 INTRACTABLE CHRONIC MIGRAINE WITHOUT AURA AND WITH STATUS MIGRAINOSUS: Primary | ICD-10-CM

## 2020-06-10 RX ORDER — PROPRANOLOL HYDROCHLORIDE 60 MG/1
60 CAPSULE, EXTENDED RELEASE ORAL DAILY
Qty: 30 CAP | Refills: 2 | Status: SHIPPED | OUTPATIENT
Start: 2020-06-10 | End: 2020-07-07

## 2020-06-10 RX ORDER — DICLOFENAC POTASSIUM 50 MG/1
50 POWDER, FOR SOLUTION ORAL AS NEEDED
Qty: 2 PACKET | Refills: 0 | Status: SHIPPED | COMMUNITY
Start: 2020-06-10 | End: 2020-07-07

## 2020-06-10 RX ORDER — DICLOFENAC POTASSIUM 25 MG/1
25 CAPSULE, LIQUID FILLED ORAL AS NEEDED
Qty: 4 CAP | Refills: 0 | Status: SHIPPED | COMMUNITY
Start: 2020-06-10 | End: 2020-07-07

## 2020-06-10 NOTE — LETTER
6/10/20 Patient: Claudeen Jack YOB: 1975 Date of Visit: 6/10/2020 Jeannett Saint, MD 
[address] [facility] [address] VIA In Basket aKrina Cody MD 
[facility] [phone] [place] [id number] VIA In Basket Dear Jeannett Saint, MD Addie Seller, MD, Thank you for referring Ms. Buddy Gerber to [facility] for evaluation. My notes for this consultation are attached. If you have questions, please do not hesitate to call me. I look forward to following your patient along with you. Sincerely, Suzan Snowden MD

## 2020-06-10 NOTE — PROGRESS NOTES
Chief Complaint   Patient presents with    New Patient     Migraine/headache f/u was on Topamax and Imitrex yrs ago. Stopped Topamax because it made her feel bad, but Imitrex helped.       Visit Vitals  /68 (BP 1 Location: Right arm, BP Patient Position: Sitting)   Pulse 72   Temp 98.1 °F (36.7 °C)   Resp 16   Ht 5' 4\" (1.626 m)   Wt 82.6 kg (182 lb 3.2 oz)   SpO2 99%   BMI 31.27 kg/m²

## 2020-06-10 NOTE — PROGRESS NOTES
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 Stopped Topamax because it made her feel bad, but Imitrex helped. ). HPI:     Since teen age yrs, patient noted headache, bifrontal, daily, throbbing and pressure, 7/10, last whole day, no triggers, (+) nausea (+) vomiting (+) photophobia (+) phonophobia (+) visual auras. In 2016, patient saw neurologist Dr Antionette Green. Tried Imitrex (helped), Topamax (change in taste)    Patient also has bilateral hand postural tremor    ROS   (-) fever  (-) rash  All other systems reviewed and are negative    Past Medical Hx  Past Medical History:   Diagnosis Date    Arrhythmia     PAC    Arthritis     Chest pain     Diabetes ([facility] [address]     GERD (gastroesophageal reflux disease)     H/O seasonal allergies     Headache     Ill-defined condition     \"twisted\" kidney right    Ill-defined condition     cellulitis    Lyme disease     Obesity     Plantar fasciitis     PUD (peptic ulcer disease)     Trigger finger, right index finger 2016    Vitamin D deficiency    Chronic low back pain    Social Hx  Social History     Socioeconomic History    Marital status: SINGLE     Spouse name: Not on file    Number of children: Not on file    Years of education: Not on file    Highest education level: Not on file   Tobacco Use    Smoking status: Current Some Day Smoker     Packs/day: 0.50    Smokeless tobacco: Never Used   Substance and Sexual Activity    Alcohol use: Yes     Comment: rare    Drug use: Never    Sexual activity: Yes     Comment: not sexually active the past 4 years since     Social History Narrative    ** Merged History Encounter **            Family Hx  Family History   Problem Relation Age of Onset    Cancer Paternal Aunt         colon polyps    Cancer Paternal Uncle         colon cancer    Stroke Mother     Heart Disease Mother     Elevated Lipids Mother     Cancer Father         prostate    Heart Disease Father         death by MI    Coronary Artery Disease Other         mother  64, father age 67 of MI    Other Brother         Passed while sleeping at 64, neck blockages and legs, hx of vascular surg    Hypertension Brother     Diabetes Brother     Anesth Problems Neg Hx    Migraines - brother    ALLERGIES  Allergies   Allergen Reactions    Morphine Anaphylaxis     2016 administered after surgery for pain.  Morphine Unknown (comments)    Other Medication Palpitations     Peripheral Block patient noted difficulty breathing and palpitations. CURRENT MEDS  Current Outpatient Medications   Medication Sig Dispense Refill    propranolol LA (INDERAL LA) 60 mg SR capsule Take 1 Cap by mouth daily. 30 Cap 2    traMADoL (ULTRAM) 50 mg tablet Take 50 mg by mouth every six (6) hours as needed for Pain.  amLODIPine (NORVASC) 5 mg tablet Take 1 Tab by mouth daily. 30 Tab 4    omeprazole (PRILOSEC) 20 mg capsule Take 20 mg by mouth daily as needed (if heartburn persists after first dose).  cyclobenzaprine (FLEXERIL) 5 mg tablet Take 10 mg by mouth nightly as needed for Muscle Spasm(s).  busPIRone (BUSPAR) 7.5 mg tablet Take 1 Tab by mouth three (3) times daily. Indications: repeated episodes of anxiety 90 Tab 1    esomeprazole magnesium (NEXIUM PO) Take 20 mg by mouth two (2) times a day.  linaclotide (LINZESS PO) Take  by mouth.  SUMAtriptan (IMITREX) 100 mg tablet TAKE 1 TABLET BY MOUTH 1 TIME AS NEEDED AT THE START OF THE HEADACHE. MAY REPEAT IN 2 HOURS IF THE HEADACHE PERSISTS 15 Tab 2    SUMAtriptan (IMITREX) 100 mg tablet TAKE 1 TABLET BY MOUTH 1 TIME AS NEEDED AT THE START OF THE HEADACHE.  MAY REPEAT IN 2 HOURS IF THE HEADACHE PERSISTS 15 Tab 0    topiramate (TOPAMAX) 25 mg tablet TAKE 1 TABLET EVERY NIGHT AT BEDTIME WEEK 1, 1 TWICE DAILY WEEK 2, 1 EVERY MORNING AND 2 EVERY NIGHT AT BEDTIME WEEK 3, THEN 2 TWICE DAILY 210 Tab 2    HYDROcodone-acetaminophen (NORCO) 5-325 mg per tablet Take 1 Tab by mouth every twelve (12) hours as needed for Pain.  gabapentin (NEURONTIN) 100 mg capsule Take 400 mg by mouth daily as needed for Pain.  Omeprazole delayed release (PRILOSEC D/R) 20 mg tablet Take 20 mg by mouth two (2) times a day.  LORazepam (ATIVAN) 0.5 mg tablet Take 0.5 mg by mouth two (2) times daily as needed for Anxiety.  traZODone (DESYREL) 100 mg tablet Take 200 mg by mouth nightly as needed for Sleep. PREVIOUS WORKUP: (reviewed)  IMAGING:    CT Results (recent):  Results from Hospital Encounter encounter on 12/03/16   CT NECK SOFT TISSUE W CONT    Narrative Uncal history: Possible abscess    INDICATION:  Possible abscess    COMPARISON:  None    TECHNIQUE:  Axial neck CT was performed after the uneventful administration of  Isovue-370. Coronal and sagittal reconstructions were performed. CT dose reduction was achieved through use of a standardized protocol tailored  for this examination and automatic exposure control for dose modulation. FINDINGS:  There is edema and phlegmonous change in the right and left cervical region with  a focal area of increased fluid in the right submandibular region subcutaneous  tissue. An additional area of fluid accumulation is noted just lateral to the  left submandibular gland. There is no abscess or drainable fluid collection. The nasopharynx, oropharynx, hypopharynx and larynx are normal.  There is  limitation in evaluating the oral cavity, although grossly the oral tongue,  buccal spaces and floor of mouth are normal.   The thyroid, submandibular, and  parotid glands are normal.   There are scattered nonenlarged lymph nodes in the  cervical soft tissues.       Impression IMPRESSION:  No abscess or drainable fluid collection. Diffuse edema in the cervical region with areas of fluid adjacent to the  submandibular gland on the left and in the subcutaneous tissue on the right. MRI Results (recent):  Results from [place] encounter on 02/25/16   MRI SHOULDER RT WO CONT    Narrative **Final Report**       ICD Codes / Adm. Diagnosis: 719.41  727.61 / Pain in joint, shoulder region    Complete rupture of rotator   Examination:  MR SHOULDER WO CON RT  - [id number] - Feb 25 2016  1:03PM  Accession No:  [id number]  Reason:  Right shoulder pain      REPORT:  EXAM:  MR SHOULDER WO CON RT    INDICATION: Right shoulder pain    COMPARISON: June 23, 2015    TECHNIQUE: Axial proton density fat-saturated; oblique coronal T1, T2   fat-saturated, and proton density fat-saturated; and oblique sagittal T2   fat-saturated MRI of the right shoulder. CONTRAST: None. FINDINGS: A.C. joint: There is mild degeneration acromioclavicular joint   Anterior acromion process type: 2    Bone marrow: There is a soft tissue anchor within the lesser tuberosity. No   acute fracture. Joint fluid: None. Rotator cuff tendons: There is tendinopathy of the supraspinatus without   discrete tear. Infraspinatus and teres minor are intact. There is increased   signal within the superior fibers of the subscapularis, postsurgical. A   recurrent tear is not demonstrated     Biceps tendon: Intact and located within the bicipital groove. Muscles: No edema or atrophy. Glenoid labrum: Posterior superior labrum is degenerated in signal,   unchanged. No new labral tear     Joint capsule: within normal limits. Glenohumeral articular cartilage: Intact. No focal osteochondral lesion. Soft tissue mass: None. IMPRESSION:   1. Status post repair of the superior subscapularis. No recurrent tear  2. Tendinopathy of the supraspinatus without tear  3.  Degenerative signal posterior superior labrum Signing/Reading Doctor: Veto Zazueta ([id number])    Shivani Darnell DIANA ([id number])  Feb 25 2016  2:18PM                                   IR Results (recent):  Results from Hospital Encounter encounter on 03/09/15   IR INJ SPINE L/S THERAPEUTIC SINGLE    Narrative **Final Report**       ICD Codes / Adm. Diagnosis: 724.02  524.60 / Spinal stenosis, lumbar region    Examinationliza Gardner EPIDURAL  - [id number] - Mar  9 2015  1:55PM  Accession No:  [id number]  Reason:  bilat TESI L5-S1 for stenosis      REPORT:  Epidural steroid injection    INDICATION: Pain    Informed consent obtained. At L5-S1, a Chiba needle was advanced into the   epidural space under fluoroscopic guidance without difficulty. After loss of   resistance, a small amount of contrast was injected to confirm appropriate   position within the epidural space. Subsequently, 80 mg Depomedrol,  2 cc 1%   lidocaine, and 5 cc saline were injected. The patient tolerated the   procedure well. There was no immediate complication. IMPRESSION: Successful epidural steroid injection as described above.               Signing/Reading Doctor: Eduin Moeller ([id number])    Approved: Eduin Moeller ([id number])  Mar  9 2015  4:07PM                                   VAS/US Results (recent):  Results from Hospital Encounter encounter on 03/01/16   ANKLE BRACHIAL INDEX           LABS (reviewed)  Results for orders placed or performed during the hospital encounter of 12/03/16   CULTURE, BLOOD, PAIRED   Result Value Ref Range    Special Requests: NO SPECIAL REQUESTS      Culture result: NO GROWTH 5 DAYS     CULTURE, WOUND W GRAM STAIN   Result Value Ref Range    Special Requests: R SIDE OF FACE  looking for MRSA       GRAM STAIN NO WBC'S SEEN      GRAM STAIN NO ORGANISMS SEEN      Culture result: FEW  STAPHYLOCOCCUS AUREUS           Susceptibility    Staphylococcus aureus - BERTHA     Ampicillin/sulbactam ($) <=8/4 Susceptible ug/mL     Cefazolin ($) <=4 Susceptible ug/mL     Clindamycin ($) <=0.5 Susceptible ug/mL     Daptomycin ($$$$$) 1 Susceptible ug/mL     Erythromycin ($$$$) <=0.5 Susceptible ug/mL     Gentamicin ($) <=4 Susceptible ug/mL     Linezolid ($$$$$) 4 Susceptible ug/mL     Oxacillin <=0.25 Susceptible ug/mL     Rifampin ($$$$) <=1 Susceptible ug/mL     Rifampin ($$$$) Value in next row  ug/mL      Rifampin is not to be used for mono-therapy. Tetracycline <=4 Susceptible ug/mL     Trimeth-Sulfamethoxa <=0.5/9.5 Susceptible ug/mL     Vancomycin ($) 1 Susceptible ug/mL     Ciprofloxacin ($) <=1 Susceptible ug/mL   LACTIC ACID, PLASMA   Result Value Ref Range    Lactic acid 1.0 0.4 - 2.0 MMOL/L   CBC WITH AUTOMATED DIFF   Result Value Ref Range    WBC 16.7 (H) 3.6 - 11.0 K/uL    RBC 4.61 3.80 - 5.20 M/uL    HGB 14.9 11.5 - 16.0 g/dL    HCT 42.2 35.0 - 47.0 %    MCV 91.5 80.0 - 99.0 FL    MCH 32.3 26.0 - 34.0 PG    MCHC 35.3 30.0 - 36.5 g/dL    RDW 14.4 11.5 - 14.5 %    PLATELET 958 880 - 244 K/uL    NEUTROPHILS 87 (H) 32 - 75 %    LYMPHOCYTES 7 (L) 12 - 49 %    MONOCYTES 6 5 - 13 %    EOSINOPHILS 0 0 - 7 %    BASOPHILS 0 0 - 1 %    ABS. NEUTROPHILS 14.4 (H) 1.8 - 8.0 K/UL    ABS. LYMPHOCYTES 1.2 0.8 - 3.5 K/UL    ABS. MONOCYTES 1.0 0.0 - 1.0 K/UL    ABS. EOSINOPHILS 0.0 0.0 - 0.4 K/UL    ABS.  BASOPHILS 0.0 0.0 - 0.1 K/UL   HCG QL SERUM   Result Value Ref Range    HCG, Ql. NEGATIVE  NEG     HEMOGLOBIN A1C WITH EAG   Result Value Ref Range    Hemoglobin A1c 5.4 4.2 - 6.3 %    Est. average glucose 108 mg/dL   CBC W/O DIFF   Result Value Ref Range    WBC 25.0 (H) 3.6 - 11.0 K/uL    RBC 4.13 3.80 - 5.20 M/uL    HGB 13.0 11.5 - 16.0 g/dL    HCT 37.8 35.0 - 47.0 %    MCV 91.5 80.0 - 99.0 FL    MCH 31.5 26.0 - 34.0 PG    MCHC 34.4 30.0 - 36.5 g/dL    RDW 14.4 11.5 - 14.5 %    PLATELET 400 832 - 235 K/uL   METABOLIC PANEL, BASIC   Result Value Ref Range    Sodium 137 136 - 145 mmol/L    Potassium 3.5 3.5 - 5.1 mmol/L    Chloride 104 97 - 108 mmol/L CO2 24 21 - 32 mmol/L    Anion gap 9 5 - 15 mmol/L    Glucose 162 (H) 65 - 100 mg/dL    BUN 8 6 - 20 MG/DL    Creatinine 0.61 0.55 - 1.02 MG/DL    BUN/Creatinine ratio 13 12 - 20      GFR est AA >60 >60 ml/min/1.73m2    GFR est non-AA >60 >60 ml/min/1.73m2    Calcium 8.4 (L) 8.5 - 10.1 MG/DL   VANCOMYCIN, TROUGH   Result Value Ref Range    Vancomycin,trough 15.6 (H) 5.0 - 10.0 ug/mL    Reported dose date: NOT PROVIDED      Reported dose time: NOT PROVIDED      Reported dose: NOT PROVIDED UNITS   POC CHEM8   Result Value Ref Range    Calcium, ionized (POC) 1.08 (L) 1.12 - 1.32 MMOL/L    Sodium (POC) 138 136 - 145 MMOL/L    Potassium (POC) 3.3 (L) 3.5 - 5.1 MMOL/L    Chloride (POC) 100 98 - 107 MMOL/L    CO2 (POC) 21 21 - 32 MMOL/L    Anion gap (POC) 22 (H) 5 - 15 mmol/L    Glucose (POC) 92 65 - 105 MG/DL    BUN (POC) 3 (L) 9 - 20 MG/DL    Creatinine (POC) 0.5 (L) 0.6 - 1.3 MG/DL    GFRAA, POC >60 >60 ml/min/1.73m2    GFRNA, POC >60 >60 ml/min/1.73m2    Hemoglobin (POC) 14.3 11.5 - 16.0 GM/DL    Hematocrit (POC) 42 35.0 - 47.0 %    Comment Comment Not Indicated. GLUCOSE, POC   Result Value Ref Range    Glucose (POC) 261 (H) 65 - 100 mg/dL    Performed by Antionette Chow    GLUCOSE, POC   Result Value Ref Range    Glucose (POC) 190 (H) 65 - 100 mg/dL    Performed by Johana Marie (PCT)    GLUCOSE, POC   Result Value Ref Range    Glucose (POC) 139 (H) 65 - 100 mg/dL    Performed by Antionette Chow        Physical Exam:     Visit Vitals  /68 (BP 1 Location: Right arm, BP Patient Position: Sitting)   Pulse 72   Temp 98.1 °F (36.7 °C)   Resp 16   Ht 5' 4\" (1.626 m)   Wt 82.6 kg (182 lb 3.2 oz)   SpO2 99%   BMI 31.27 kg/m²     General:  Alert, cooperative, no distress. Head:  Normocephalic, without obvious abnormality, atraumatic. Eyes:  Conjunctivae/corneas clear.    Lungs:  Heart:   Non labored breathing  Regular rate and rhythm, no carotid bruits   Abdomen:   Soft, non-distended   Extremities: Extremities normal, atraumatic, no cyanosis or edema. Pulses: 2+ and symmetric all extremities. Skin: Skin color, texture, turgor normal. No rashes or lesions. Neurologic Exam     Gen: Attention normal             Language: naming, repetition, fluency normal             Memory: intact recent and remote memory  Cranial Nerves:  I: smell Not tested   II: visual fields Full to confrontation   II: pupils Equal, round, reactive to light   II: optic disc No papilledema   III,VII: ptosis none   III,IV,VI: extraocular muscles  Full ROM   V: mastication normal   V: facial light touch sensation  normal   VII: facial muscle function   symmetric   VIII: hearing symmetric   IX: soft palate elevation  normal   XI: trapezius strength  5/5   XI: sternocleidomastoid strength 5/5   XI: neck flexion strength  5/5   XII: tongue  midline     Motor: normal bulk and tone, no tremor              Strength: 5/5 all four extremities  Sensory: intact to LT, PP, vibration, and JPS  Reflexes: 2+ throughout; Down going toes  Coordination: Good FTN and HTS  Gait: normal gait including tandem            Impression:     Miki Lesches is a 50 y.o. female who  has a past medical history of Arrhythmia, Arthritis, Chest pain, Diabetes ([facility] [address] GERD (gastroesophageal reflux disease), H/O seasonal allergies, Headache, Ill-defined condition, Ill-defined condition, Lyme disease, Obesity, Plantar fasciitis, PUD (peptic ulcer disease), Trigger finger, right index finger (2/2016), and Vitamin D deficiency. who Since teen age yrs, patient noted headache, bifrontal, daily, throbbing and pressure, 7/10, last whole day, no triggers, (+) nausea (+) vomiting (+) photophobia (+) phonophobia (+) visual auras. Consideration includes migraine, with visual auras, intractable. In 2016, patient saw neurologist Dr Kathi Garza. Tried Imitrex (helped), Topamax (change in taste). Patient also has bilateral hand postural tremor concerning for essential tremor. RECOMMENDATIONS  1.   I had a long discussion with patient. Discussed diagnosis, prognosis, pathophysiology and available treatment. All questions were answered. 2. Due to worsening daily headache, will order MRI of brain to evaluate for structural cause of symptoms  3. Trial of Inderal LA 60 mg every day as migraine prophylaxis and will help with tremor and high BP. Patient to talk to her cardiologist Dr Marino Wen if he is okay with this (getting evaluated for chest pain)  4. Given samples of Cambia, Zipsor, Ubrelvy and Nurtec ODT to try to abort headaches  5. Discussed the need for headache diary and went through the list that can trigger headache (I.e. cheese and stress)  6. Of note, patient is afraid of needles so was reluctant in trying the CGRPs      Follow-up and Dispositions    · Return in about 1 month (around 7/10/2020).             Thank you for the consultation      Doug Herrera MD  Diplomate, American Board of Psychiatry and Neurology  Diplomate, Neuromuscular Medicine  Diplomate, American Board of Electrodiagnostic Medicine        CC: Jourdan Foley MD  Fax: [phone]

## 2020-06-22 ENCOUNTER — HOSPITAL ENCOUNTER (OUTPATIENT)
Dept: MRI IMAGING | Age: 45
Discharge: HOME | End: 2020-06-22
Attending: PSYCHIATRY & NEUROLOGY
Payer: COMMERCIAL

## 2020-06-22 DIAGNOSIS — R25.1 TREMOR: ICD-10-CM

## 2020-06-22 DIAGNOSIS — G43.711 INTRACTABLE CHRONIC MIGRAINE WITHOUT AURA AND WITH STATUS MIGRAINOSUS: ICD-10-CM

## 2020-06-22 PROCEDURE — 70551 MRI BRAIN STEM W/O DYE: CPT

## 2020-06-24 NOTE — PROGRESS NOTES
Annual exam ages 40-58    Yvan Marx is a No obstetric history on file. ,  50 y.o. female WHITE OR  No LMP recorded. (Menstrual status: Menopause). , who presents for her annual checkup. This is her first annual exam with our office and in about 6 years. ADDITIONAL CONCERNS:  She is having no significant problems. With regard to the Gardasil vaccine, she is older than the age for which it is FDA approved. Menstrual status:    Her periods are absent in flow since 2019. Contraception:    The current method of family planning is post menopausal status. Sexual history:     reports being sexually active and has had partner(s) who are Male. Pap and Mammogram History:    Pt states she has a history of anbormal paps, and she never followed up. The patient reports last mammogram several years ago. Osteoporosis History:    Family history does include a first or second degree relative with osteopenia or osteoporosis.  (aunt)      Past Medical History:   Diagnosis Date    Arrhythmia     PAC    Arthritis     Chest pain     Diabetes ([facility] [address]     GERD (gastroesophageal reflux disease)     H/O seasonal allergies     Headache     Ill-defined condition     \"twisted\" kidney right    Ill-defined condition     cellulitis    Lyme disease     Obesity     Plantar fasciitis     PUD (peptic ulcer disease)     Trigger finger, right index finger 2016    Vitamin D deficiency      Past Surgical History:   Procedure Laterality Date    HX GASTRIC BYPASS      HX GI      HX HEART CATHETERIZATION  7/28/15    HX ORTHOPAEDIC  7-30-15    Rt. rotator cuff repair     HX TONSILLECTOMY      [place] COLSC FLX W/NDSC US XM RCTM ET AL LMTD&ADJ [address] [facility]       OB History    Para Term  AB Living   2 1 1   1 1   SAB TAB Ectopic Molar Multiple Live Births     1              # Outcome Date GA Lbr Jayro/2nd Weight Sex Delivery Anes PTL Lv   2 Term 2005    F  None     1 TAB 1994 Current Outpatient Medications   Medication Sig Dispense Refill    propranolol LA (INDERAL LA) 60 mg SR capsule Take 1 Cap by mouth daily. 30 Cap 2    Diclofenac Potassium (Cambia) 50 mg pwpk Take 50 mg by mouth as needed (for migraines). 2 Packet 0    diclofenac potassium (Zipsor) 25 mg capsule Take 1 Cap by mouth as needed (for migraine). 4 Cap 0    busPIRone (BUSPAR) 7.5 mg tablet Take 1 Tab by mouth three (3) times daily. Indications: repeated episodes of anxiety 90 Tab 1    traMADoL (ULTRAM) 50 mg tablet Take 50 mg by mouth every six (6) hours as needed for Pain.  amLODIPine (NORVASC) 5 mg tablet Take 1 Tab by mouth daily. 30 Tab 4    esomeprazole magnesium (NEXIUM PO) Take 20 mg by mouth two (2) times a day.  linaclotide (LINZESS PO) Take  by mouth.  SUMAtriptan (IMITREX) 100 mg tablet TAKE 1 TABLET BY MOUTH 1 TIME AS NEEDED AT THE START OF THE HEADACHE. MAY REPEAT IN 2 HOURS IF THE HEADACHE PERSISTS 15 Tab 2    SUMAtriptan (IMITREX) 100 mg tablet TAKE 1 TABLET BY MOUTH 1 TIME AS NEEDED AT THE START OF THE HEADACHE. MAY REPEAT IN 2 HOURS IF THE HEADACHE PERSISTS 15 Tab 0    topiramate (TOPAMAX) 25 mg tablet TAKE 1 TABLET EVERY NIGHT AT BEDTIME WEEK 1, 1 TWICE DAILY WEEK 2, 1 EVERY MORNING AND 2 EVERY NIGHT AT BEDTIME WEEK 3, THEN 2 TWICE DAILY 210 Tab 2    HYDROcodone-acetaminophen (NORCO) 5-325 mg per tablet Take 1 Tab by mouth every twelve (12) hours as needed for Pain.  omeprazole (PRILOSEC) 20 mg capsule Take 20 mg by mouth daily as needed (if heartburn persists after first dose).  cyclobenzaprine (FLEXERIL) 5 mg tablet Take 10 mg by mouth nightly as needed for Muscle Spasm(s).  gabapentin (NEURONTIN) 100 mg capsule Take 400 mg by mouth daily as needed for Pain.  Omeprazole delayed release (PRILOSEC D/R) 20 mg tablet Take 20 mg by mouth two (2) times a day.       LORazepam (ATIVAN) 0.5 mg tablet Take 0.5 mg by mouth two (2) times daily as needed for Anxiety.  traZODone (DESYREL) 100 mg tablet Take 200 mg by mouth nightly as needed for Sleep. Allergies: Morphine; Morphine; and Other medication   Social History     Socioeconomic History    Marital status: SINGLE     Spouse name: Not on file    Number of children: Not on file    Years of education: Not on file    Highest education level: Not on file   Occupational History    Not on file   Social Needs    Financial resource strain: Not on file    Food insecurity     Worry: Not on file     Inability: Not on file    Transportation needs     Medical: Not on file     Non-medical: Not on file   Tobacco Use    Smoking status: Current Some Day Smoker     Packs/day: 0.50    Smokeless tobacco: Never Used   Substance and Sexual Activity    Alcohol use: Not Currently     Comment: rare    Drug use: Never    Sexual activity: Yes     Partners: Male   Lifestyle    Physical activity     Days per week: Not on file     Minutes per session: Not on file    Stress: Not on file   Relationships    Social connections     Talks on phone: Not on file     Gets together: Not on file     Attends Worship service: Not on file     Active member of club or organization: Not on file     Attends meetings of clubs or organizations: Not on file     Relationship status: Not on file    Intimate partner violence     Fear of current or ex partner: Not on file     Emotionally abused: Not on file     Physically abused: Not on file     Forced sexual activity: Not on file   Other Topics Concern    Not on file   Social History Narrative    ** Merged History Encounter **          Tobacco History:  reports that she has been smoking. She has been smoking about 0.50 packs per day. She has never used smokeless tobacco.  Alcohol Abuse:  reports previous alcohol use. Drug Abuse:  reports no history of drug use.     Patient Active Problem List   Diagnosis Code    SOB (shortness of breath) R06.02    Chronic back pain M54.9, G89.29    Obesity E66.9    Lyme disease A69.20    Anxiety F41.9    Diverticular disease K57.90    GERD (gastroesophageal reflux disease) K21.9    Foot pain, bilateral M79.671, M79.672    Unspecified hereditary and idiopathic peripheral neuropathy G60.9    TMJ tenderness M26.629    Sinusitis J32.9    New daily persistent headache G44.52    Chest pain with normal coronary angiography R07.9    Morbid obesity with BMI of 45.0-49.9, adult ([facility]) E66.01, Z68.42    Elevated pulse rate R00.0    Cellulitis and abscess of neck L03.221, L02.11    Controlled type 2 diabetes mellitus without complication ([facility]) D99.8    Sepsis ([facility]) A41.9    Migraine without status migrainosus, not intractable G43.909    Anxiety F41.9    Gastroparesis K31.84    Other hyperlipidemia E78.49    Controlled type 2 diabetes mellitus without complication, without long-term current use of insulin ([facility]) E11.9     Family History   Problem Relation Age of Onset    Cancer Paternal Aunt         colon polyps    Cancer Paternal Uncle         colon cancer    Stroke Mother     Heart Disease Mother     Elevated Lipids Mother     Cancer Father         prostate    Heart Disease Father         death by MI    Coronary Artery Disease Other         mother  64, father age 67 of MI    Other Brother         Passed while sleeping at 64, neck blockages and legs, hx of vascular surg    Hypertension Brother     Diabetes Brother     Anesth Problems Neg Hx        Review of Systems - History obtained from the patient  Constitutional: negative for weight loss, fever, night sweats  HEENT: negative for hearing loss, earache, congestion, sorethroat  CV: negative for chest pain  Resp: negative for cough, wheezing  GI: negative for change in bowel habits, black or bloody stools; occ RLQ pain  : negative for frequency, dysuria, hematuria, vaginal discharge  MSK: left hip pain (just got cortisone shot last week, not helping)  Breast: negative for breast lumps, nipple discharge, galactorrhea  Skin :negative for itching, rash, hives  Neuro: negative for confusion, weakness  Psych: negative for anxiety, depression, change in mood  Heme/lymph: negative for bleeding, bruising, pallor    Physical Exam    Visit Vitals  BP (!) 141/95 (BP 1 Location: Left arm, BP Patient Position: Sitting)   Wt 179 lb (81.2 kg)   BMI 30.73 kg/m²       Constitutional  · Appearance: well-nourished, well developed, alert, in no acute distress    HENT  · Head and Face: appears normal    Neck  · Inspection/Palpation: normal appearance, no masses or tenderness  · Lymph Nodes: no lymphadenopathy present  · Thyroid: gland size normal, nontender, no nodules or masses present on palpation    Chest  · Respiratory Effort: breathing unlabored  · Auscultation: normal breath sounds    Cardiovascular  · Heart:  · Auscultation: regular rate and rhythm without murmur    Breasts  · Inspection of Breasts: breasts symmetrical, no skin changes, no discharge present, nipple appearance normal, no skin retraction present  · Palpation of Breasts and Axillae: no masses present on palpation, no breast tenderness  · Axillary Lymph Nodes: no lymphadenopathy present    Gastrointestinal  · Abdominal Examination: abdomen non-tender to palpation, normal bowel sounds, no masses present  · Liver and spleen: no hepatomegaly present, spleen not palpable  · Hernias: no hernias identified    Genitourinary  · External Genitalia: normal appearance for age, no discharge present, no tenderness present, no inflammatory lesions present, no masses present, no atrophy present  · Vagina: normal vaginal vault without central or paravaginal defects, no discharge present, no inflammatory lesions present, no masses present  · Bladder: non-tender to palpation  · Urethra: appears normal  · Cervix: normal   · Uterus: normal size, shape and consistency  · Adnexa: no adnexal tenderness present, no adnexal masses present  · Perineum: perineum within normal limits, no evidence of trauma, no rashes or skin lesions present  · Anus: anus within normal limits, no hemorrhoids present  · Inguinal Lymph Nodes: no lymphadenopathy present    Skin  · General Inspection: no rash, no lesions identified    Neurologic/Psychiatric  · Mental Status:  · Orientation: grossly oriented to person, place and time  · Mood and Affect: mood normal, affect appropriate      Assessment & Plan:  · Routine gynecologic examination. Pap/HPV done. · Amenorrhea. TSH, PRL, FSH/LH, E2  · Her current medical status is satisfactory with no evidence of significant gynecologic issues. · Counseled re: diet, exercise, healthy lifestyle  · Return for yearly wellness visits  · Rec annual mammogram.  Cannot stay to get added on today. Order given. · Patient verbalized understanding           Orders Placed This Encounter    ANIKA MAMMO BI SCREENING INCL CAD     Standing Status:   Future     Standing Expiration Date:   7/25/2021     Order Specific Question:   Is Patient Pregnant? Answer:   No    TSH 3RD GENERATION    FSH AND LH    ESTRADIOL    PROLACTIN    PAP IG, HPV AND RFX HPV 41/89,08([id number])     Order Specific Question:   Pap Source? Answer:   Cervical and Endocervical     Order Specific Question:   Total Hysterectomy? Answer:   No     Order Specific Question:   Supracervical Hysterectomy? Answer:   No     Order Specific Question:   Post Menopausal?     Answer:   Yes     Order Specific Question:   Hormone Therapy? Answer:   No     Order Specific Question:   IUD? Answer:   No     Order Specific Question:   Abnormal Bleeding? Answer:   No     Order Specific Question:   Pregnant     Answer:   No     Order Specific Question:   Post Partum? Answer:    No

## 2020-06-25 ENCOUNTER — OFFICE VISIT (OUTPATIENT)
Dept: OBGYN CLINIC | Age: 45
End: 2020-06-25

## 2020-06-25 VITALS — BODY MASS INDEX: 30.73 KG/M2 | WEIGHT: 179 LBS | DIASTOLIC BLOOD PRESSURE: 95 MMHG | SYSTOLIC BLOOD PRESSURE: 141 MMHG

## 2020-06-25 DIAGNOSIS — Z01.419 ENCOUNTER FOR GYNECOLOGICAL EXAMINATION: Primary | ICD-10-CM

## 2020-06-25 DIAGNOSIS — N91.2 AMENORRHEA: ICD-10-CM

## 2020-06-25 DIAGNOSIS — Z12.4 SCREENING FOR CERVICAL CANCER: ICD-10-CM

## 2020-06-25 DIAGNOSIS — Z12.39 SCREENING FOR BREAST CANCER: ICD-10-CM

## 2020-06-26 LAB
ESTRADIOL SERPL-MCNC: 192.1 PG/ML
FSH SERPL-ACNC: 19.2 MIU/ML
LH SERPL-ACNC: 16.1 MIU/ML
PROLACTIN SERPL-MCNC: 8.2 NG/ML (ref 4.8–23.3)
TSH SERPL DL<=0.005 MIU/L-ACNC: 1.34 UIU/ML (ref 0.45–4.5)

## 2020-07-04 LAB
CYTOLOGIST CVX/VAG CYTO: NORMAL
CYTOLOGY CVX/VAG DOC CYTO: NORMAL
CYTOLOGY CVX/VAG DOC THIN PREP: NORMAL
DX ICD CODE: NORMAL
HPV I/H RISK 1 DNA CVX QL PROBE+SIG AMP: NEGATIVE
Lab: NORMAL
OTHER STN SPEC: NORMAL
STAT OF ADQ CVX/VAG CYTO-IMP: NORMAL

## 2020-07-07 ENCOUNTER — OFFICE VISIT (OUTPATIENT)
Dept: NEUROLOGY | Age: 45
End: 2020-07-07

## 2020-07-07 VITALS
OXYGEN SATURATION: 98 % | WEIGHT: 181 LBS | RESPIRATION RATE: 17 BRPM | HEIGHT: 64 IN | SYSTOLIC BLOOD PRESSURE: 154 MMHG | DIASTOLIC BLOOD PRESSURE: 108 MMHG | TEMPERATURE: 97 F | BODY MASS INDEX: 30.9 KG/M2 | HEART RATE: 91 BPM

## 2020-07-07 DIAGNOSIS — R00.0 ELEVATED PULSE RATE: ICD-10-CM

## 2020-07-07 DIAGNOSIS — E11.9 CONTROLLED TYPE 2 DIABETES MELLITUS WITHOUT COMPLICATION, WITHOUT LONG-TERM CURRENT USE OF INSULIN (HCC): ICD-10-CM

## 2020-07-07 DIAGNOSIS — G43.711 INTRACTABLE CHRONIC MIGRAINE WITHOUT AURA AND WITH STATUS MIGRAINOSUS: Primary | ICD-10-CM

## 2020-07-07 DIAGNOSIS — R55 SYNCOPE, UNSPECIFIED SYNCOPE TYPE: ICD-10-CM

## 2020-07-07 DIAGNOSIS — E78.49 OTHER HYPERLIPIDEMIA: ICD-10-CM

## 2020-07-07 DIAGNOSIS — I10 ESSENTIAL HYPERTENSION: ICD-10-CM

## 2020-07-07 DIAGNOSIS — R25.1 TREMOR: ICD-10-CM

## 2020-07-07 RX ORDER — PROPRANOLOL HYDROCHLORIDE 60 MG/1
60 CAPSULE, EXTENDED RELEASE ORAL DAILY
Qty: 30 CAP | Refills: 2 | Status: SHIPPED | OUTPATIENT
Start: 2020-07-07 | End: 2020-08-27

## 2020-07-07 NOTE — PROGRESS NOTES
[address] [facility],[address]  [address]   [address] [place] [address] [place] [address] [address]   [phone] Office   [phone] Fax           Date:  20     Name:  Satya Davis  :  1975  MRN:  [id number]     PCP:  Jasiel Garcia MD    Chief Complaint   Patient presents with    Migraine     contusion    Results     HISTORY OF PRESENT ILLNESS: Follow up for migraines. Migraines are ongoing and daily. She was recently seen by Dr. Bird Mak approximately 1 month ago for the same. She was sent for an MRI of the brain due to persistent headache which was normal.  He recommended that she start Inderal LA 60 mg daily for migraine prevention and to help with the tremor. He asked that she see Dr. Anny Chacon and ask his advice regarding starting this medication prior to starting it due to her work-up for intermittent chest pain and palpitations. Since then, she has decided to switch cardiologists and is due to be seen by her new cardiologist in the near future. She has not started the Inderal LA at this point. She has seen her primary care physician virtually but has not met with her physically as of yet. This is set for sometime in August.  She does have hypertension for which she is only taking Norvasc 5 mg daily. Recently was seen at [facility] due to a syncopal episode. Prior to this event she was talking on her phone while sitting on her back porch. When she came into the house she became lightheaded and nauseated. She blacked out striking her left arm and left side of her face on a dog crate or possibly the edge of her fish tank. She had a similar event about six months ago and another three months prior to this. Each episode was preceded by feeling lightheaded and nauseated. Episodes were unwitnessed. She thinks that she is out for about 5 minutes when these occur. She has not lost control of bowel or bladder.   No previous history of seizure, head trauma, or CNS infection. Brother and mother passed at age 64 due to heart attack. Mother also had an aneurysm at the time as well. Brother had a history of a bypass and severe arteriolosclerotic disease. Current Outpatient Medications   Medication Sig    busPIRone (BUSPAR) 7.5 mg tablet Take 1 Tab by mouth three (3) times daily. Indications: repeated episodes of anxiety    amLODIPine (NORVASC) 5 mg tablet Take 1 Tab by mouth daily.  esomeprazole magnesium (NEXIUM PO) Take 20 mg by mouth two (2) times a day.  linaclotide (LINZESS PO) Take  by mouth.  HYDROcodone-acetaminophen (NORCO) 5-325 mg per tablet Take 1 Tab by mouth every twelve (12) hours as needed for Pain.  omeprazole (PRILOSEC) 20 mg capsule Take 20 mg by mouth daily as needed (if heartburn persists after first dose).  cyclobenzaprine (FLEXERIL) 5 mg tablet Take 10 mg by mouth nightly as needed for Muscle Spasm(s).  traZODone (DESYREL) 100 mg tablet Take 200 mg by mouth nightly as needed for Sleep. No current facility-administered medications for this visit. Allergies   Allergen Reactions    Morphine Anaphylaxis     July 5, 2016 administered after surgery for pain.  Morphine Unknown (comments)    Other Medication Palpitations     Peripheral Block patient noted difficulty breathing and palpitations.      Past Medical History:   Diagnosis Date    Arrhythmia     PAC    Arthritis     Chest pain     Diabetes ([address]     GERD (gastroesophageal reflux disease)     H/O seasonal allergies     Headache     Ill-defined condition     \"twisted\" kidney right    Ill-defined condition     cellulitis    Lyme disease     Obesity     Plantar fasciitis     PUD (peptic ulcer disease)     Trigger finger, right index finger 2/2016    Vitamin D deficiency      Past Surgical History:   Procedure Laterality Date    HX GASTRIC BYPASS  2015    HX GI      HX HEART CATHETERIZATION  7/28/15    HX ORTHOPAEDIC  7-30-15    Rt. rotator cuff repair     HX TONSILLECTOMY      [place] COLSC FLX W/NDSC US XM RCTM ET AL LMTD&ADJ STRUX  2011     Social History     Socioeconomic History    Marital status:      Spouse name: Not on file    Number of children: Not on file    Years of education: Not on file    Highest education level: Not on file   Occupational History    Not on file   Social Needs    Financial resource strain: Not on file    Food insecurity     Worry: Not on file     Inability: Not on file    Transportation needs     Medical: Not on file     Non-medical: Not on file   Tobacco Use    Smoking status: Current Some Day Smoker     Packs/day: 0.50    Smokeless tobacco: Never Used   Substance and Sexual Activity    Alcohol use: Not Currently     Comment: rare    Drug use: Never    Sexual activity: Yes     Partners: Male   Lifestyle    Physical activity     Days per week: Not on file     Minutes per session: Not on file    Stress: Not on file   Relationships    Social connections     Talks on phone: Not on file     Gets together: Not on file     Attends Yarsani service: Not on file     Active member of club or organization: Not on file     Attends meetings of clubs or organizations: Not on file     Relationship status: Not on file    Intimate partner violence     Fear of current or ex partner: Not on file     Emotionally abused: Not on file     Physically abused: Not on file     Forced sexual activity: Not on file   Other Topics Concern    Not on file   Social History Narrative    ** Merged History Encounter **          Family History   Problem Relation Age of Onset    Cancer Paternal Aunt         colon polyps    Cancer Paternal Uncle         colon cancer    Stroke Mother     Heart Disease Mother     Elevated Lipids Mother     Cancer Father         prostate    Heart Disease Father         death by MI    Coronary Artery Disease Other         mother  64, father age 67 of MI   [address] [place] [address] Other Brother         Passed while sleeping at 64, neck blockages and legs, hx of vascular surg    Hypertension Brother     Diabetes Brother     Anesth Problems Neg Hx        PHYSICAL EXAMINATION:    Visit Vitals  BP (!) 154/108   Pulse 91   Temp 97 °F (36.1 °C) (Temporal)   Resp 17   Ht 5' 4\" (1.626 m)   Wt 82.1 kg (181 lb)   SpO2 98%   BMI 31.07 kg/m²     General:  Well defined, nourished, and groomed individual in no acute distress. Neck: Supple, nontender, no bruits, no pain with resistance to active range of motion. Heart: Regular rate and rhythm, no murmurs, rub, or gallop. Normal S1S2. Lungs:  Clear to auscultation bilaterally with equal chest expansion, no cough, no wheeze  Musculoskeletal:  Extremities revealed no edema and had full range of motion of joints. She does have a bandage to the left arm due to the cut and swelling sustained as a result of her syncopal episode. There is significant bruising up the arm. Psych:  Good mood and bright affect    NEUROLOGICAL EXAMINATION:     Mental Status:   Alert and oriented to person, place, and time with recent and remote memory intact. Attention span and concentration are normal. Speech is fluent with a full fund of knowledge. Cranial Nerves:  I: smell Not tested   II: visual fields Full to confrontation   II: pupils Equal, round, reactive to light   II: optic disc No papilledema   III,VII: ptosis none   III,IV,VI: extraocular muscles  Full ROM   V: mastication normal   V: facial light touch sensation  normal   VII: facial muscle function   symmetric   VIII: hearing symmetric   IX: soft palate elevation  normal   XI: trapezius strength  5/5   XI: sternocleidomastoid strength 5/5   XI: neck flexion strength  5/5   XII: tongue  midline     Motor Examination: Normal tone, bulk, and strength, 5/5 muscle strength throughout. There is some weakness noted in the left arm which is secondary to injury.       Coordination:  Finger to nose was normal. No resting or intention tremor    Gait and Station:  Steady while walking on toes, heels, and with tandem walking. Normal arm swing. No Rhomberg or pronator drift. No muscle wasting or fasiculations noted. Reflexes:  DTRs 2+ throughout. ASSESSMENT AND PLAN    ICD-10-CM ICD-9-CM    1. Intractable chronic migraine without aura and with status migrainosus G43.711 346. 73 propranolol LA (INDERAL LA) 60 mg SR capsule   2. Tremor R25.1 781.0 propranolol LA (INDERAL LA) 60 mg SR capsule   3. Other hyperlipidemia W44.65 622.3 METABOLIC PANEL, COMPREHENSIVE      CBC WITH AUTOMATED DIFF      HEMOGLOBIN A1C WITH EAG      LIPID PANEL   4. Elevated pulse rate R00.0 785.0 TSH 3RD GENERATION      T4, FREE      propranolol LA (INDERAL LA) 60 mg SR capsule   5. Controlled type 2 diabetes mellitus without complication, without long-term current use of insulin ([facility]) J44.4 382.63 METABOLIC PANEL, COMPREHENSIVE      CBC WITH AUTOMATED DIFF      HEMOGLOBIN A1C WITH EAG   6. Essential hypertension J78 786.8 METABOLIC PANEL, COMPREHENSIVE      CBC WITH AUTOMATED DIFF      propranolol LA (INDERAL LA) 60 mg SR capsule   7. Syncope, unspecified syncope type R55 780.2 EEG SLEEP DEPRIVED      EEG      DUPLEX CAROTID BILATERAL     This is a nice 40-year-old, right-handed, white  female who presented today after recent ER visit secondary to syncope. Per her report, she has had at least 3 syncopal episodes in the last year. We discussed potential etiologies of her syncope to include cardiac versus neurologic. For further evaluation of any underlying neurologic issue she will be sent for routine and sleep deprived EEG as well as a carotid Doppler. She should continue to follow-up with cardiology for cardiac work-up. Of note, she does have significant family history of atherosclerosis and heart disease.   She has a personal history of essential hypertension which at present is not controlled and had been diagnosed with diabetes at some point in the past.  Given her personal and family history, she will be sent for metabolic studies to include a CMP, CBC, hemoglobin A1c, fasting lipids, and thyroid function testing. For treatment of the hypertension as well as for migraine prevention and tremor control, she was encouraged to start the Inderal LA 60 mg daily. She was instructed to obtain a home blood pressure cuff to monitor her blood pressures. Follow-up in approximately 1 month. Mia Ordoñez

## 2020-07-07 NOTE — PROGRESS NOTES
Pt is following up for migraines, contusion, and mri results. Pt stated she has been having continuous headaches.

## 2020-07-08 ENCOUNTER — VIRTUAL VISIT (OUTPATIENT)
Dept: FAMILY MEDICINE CLINIC | Age: 45
End: 2020-07-08

## 2020-07-08 DIAGNOSIS — F41.9 ANXIETY: ICD-10-CM

## 2020-07-08 DIAGNOSIS — S40.022S HEMATOMA OF ARM, LEFT, SEQUELA: ICD-10-CM

## 2020-07-08 DIAGNOSIS — G47.00 INSOMNIA, UNSPECIFIED TYPE: ICD-10-CM

## 2020-07-08 DIAGNOSIS — Z23 ENCOUNTER FOR IMMUNIZATION: Primary | ICD-10-CM

## 2020-07-08 DIAGNOSIS — I10 ESSENTIAL HYPERTENSION: ICD-10-CM
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
## 2020-07-08 RX ORDER — ACETAMINOPHEN 500 MG
TABLET ORAL
Qty: 1 KIT | Refills: 0 | Status: SHIPPED | OUTPATIENT
Start: 2020-07-08 | End: 2020-11-12

## 2020-07-08 RX ORDER — TRAZODONE HYDROCHLORIDE 100 MG/1
200 TABLET ORAL
Qty: 60 TAB | Refills: 5 | Status: SHIPPED | OUTPATIENT
Start: 2020-07-08 | End: 2021-02-08

## 2020-07-08 RX ORDER — AMLODIPINE BESYLATE 5 MG/1
5 TABLET ORAL DAILY
Qty: 90 TAB | Refills: 0 | Status: SHIPPED | OUTPATIENT
Start: 2020-07-08 | End: 2020-09-14 | Stop reason: SDUPTHER

## 2020-07-08 RX ORDER — ALBUTEROL SULFATE 90 UG/1
AEROSOL, METERED RESPIRATORY (INHALATION)
COMMUNITY
Start: 2020-03-22 | End: 2020-11-12

## 2020-07-08 RX ORDER — DICLOFENAC SODIUM 50 MG/1
50 TABLET, DELAYED RELEASE ORAL 2 TIMES DAILY
Qty: 30 TAB | Refills: 0 | Status: SHIPPED | OUTPATIENT
Start: 2020-07-08 | End: 2020-12-03 | Stop reason: SDUPTHER

## 2020-07-08 RX ORDER — BUSPIRONE HYDROCHLORIDE 10 MG/1
10 TABLET ORAL 3 TIMES DAILY
Qty: 90 TAB | Refills: 5 | Status: SHIPPED | OUTPATIENT
Start: 2020-07-08 | End: 2021-02-08

## 2020-07-08 NOTE — PROGRESS NOTES
Consent: America Rodríguez, who was seen by synchronous (real-time) audio-video technology, and/or her healthcare decision maker, is aware that this patient-initiated, Telehealth encounter on 7/8/2020 is a billable service, with coverage as determined by her insurance carrier. She is aware that she may receive a bill and has provided verbal consent to proceed: Yes. Assessment & Plan:   Diagnoses and all orders for this visit:    1. Encounter for immunization  -     diph,Pertuss,Acell,,Tet Vac-PF (ADACEL) 2 Lf-(2.5-5-3-5 mcg)-5Lf/0.5 mL susp; 0.5 mL by IntraMUSCular route once for 1 dose. 2. Anxiety  -     busPIRone (BUSPAR) 10 mg tablet; Take 1 Tab by mouth three (3) times daily. Indications: repeated episodes of anxiety    3. Fall, sequela  -     diclofenac EC (VOLTAREN) 50 mg EC tablet; Take 1 Tab by mouth two (2) times a day. 4. Hematoma of arm, left, sequela  -     diclofenac EC (VOLTAREN) 50 mg EC tablet; Take 1 Tab by mouth two (2) times a day. 5. Insomnia, unspecified type  -     traZODone (DESYREL) 100 mg tablet; Take 2 Tabs by mouth nightly as needed for Sleep. 6. Essential hypertension  -     amLODIPine (NORVASC) 5 mg tablet; Take 1 Tab by mouth daily.  -     Blood Pressure Test Kit-Large ([employer] Talking Arm BP Monitr) kit; Check bp daily    Ms. Jeremy Linn has been given the following recommendations today due to her elevated BP reading: rescreen BP within a minimum of 2 weeks, lifestyle modifications to include: dietary sodium restriction and anti-hypertensive pharmacologic therapy ordered. IF NO IMPROVEMENT IN BP WILL GO UP ON NORVASC. Follow-up and Dispositions    · Return in about 2 weeks (around 7/22/2020), or if symptoms worsen or fail to improve. I ADVISED PATIENT TO GO TO ER IF SYMPTOMS WORSEN , CHANGE OR FAILS TO IMPROVE.     I spent at least 15 minutes with this established patient, and >50% of the time was spent counseling and/or coordinating care regarding SEE BELOW  712  Subjective:   Med Rodríguez is a 50 y.o. female who was seen for Hospital Follow Up Torin Seth H/O     Discharged 06/30/20     Hematoma) and Medication Refill (VV)      1. Anxiety  Taking buspar 7.5 mg TID. Reports it helped in the start but not helping anymore. Wants to increase the dose. Has been seeing neurologist, she stopped her amlodipine & started propranolol. Also has appointment with cardiology tomorrow. We discussed about psychiatry referal if increased dose of buspar dose not work. 2. Encounter for immunization  Reports she was not given tetanus shot in the ER. 3. Fall, sequela  Had a fall at night , tripped & fell on the dog cage & fish tank. Hit her face & left arm. Face was bruised, left arm had cuts & developed hematoma. Admitted to [facility] Friday on 6/29/2020, discharged 6/30/20. Was given naprosyn but patient did not take it as it made her feel sick        4. Hematoma of arm, left, sequela  Still swollen & bruised, applying ice. C/o pain,  Not taking naprosyn. No signs of infection at this time. We discussed to call if she develops any pus , erythema or fever. 5. INSOMNIA  REQUESTS refills of trazodone. Takes it without side effects. Diagnoses and all orders for this visit:    6. Essential hypertension  RECENTLY STARTED PROPRANOLOL FOR MIGRAINES BY NEUROLOGIST. STOPPED TAKING NORVASC BECAUSE SHE THOUGHT PROPRANOLOL WILL REPLACE NORVASC. I ADVISED HER TO TAKE BOTH NORVASC & PROPRANOLOL. Taking medications daily w/o complications. Home BP readings range from 150S / 100S. SIde effects of meds:  NONE  Patient trying to follow low salt diet.     Lab Results   Component Value Date/Time    Sodium 137 12/04/2016 04:02 AM    Potassium 3.5 12/04/2016 04:02 AM    Chloride 104 12/04/2016 04:02 AM    CO2 24 12/04/2016 04:02 AM    Anion gap 9 12/04/2016 04:02 AM    Glucose 162 (H) 12/04/2016 04:02 AM    BUN 8 12/04/2016 04:02 AM    Creatinine 0.61 12/04/2016 04:02 AM BUN/Creatinine ratio 13 12/04/2016 04:02 AM    GFR est AA >60 12/04/2016 04:02 AM    GFR est non-AA >60 12/04/2016 04:02 AM    Calcium 8.4 (L) 12/04/2016 04:02 AM     No results found for: MCACR, MCA1, MCA2, MCA3, MCAU, MCAU2, MCALPOCT         Prior to Admission medications    Medication Sig Start Date End Date Taking? Authorizing Provider   albuterol (PROVENTIL HFA, VENTOLIN HFA, PROAIR HFA) 90 mcg/actuation inhaler  3/22/20  Yes Provider, Historical   busPIRone (BUSPAR) 10 mg tablet Take 1 Tab by mouth three (3) times daily. Indications: repeated episodes of anxiety 7/8/20  Yes MD annabelle Mensah,Laura Carrasco,,Tet Vac-PF (ADACEL) 2 Lf-(2.5-5-3-5 mcg)-5Lf/0.5 mL susp 0.5 mL by IntraMUSCular route once for 1 dose. 7/8/20 7/8/20 Yes Magalis Dinh MD   diclofenac EC (VOLTAREN) 50 mg EC tablet Take 1 Tab by mouth two (2) times a day. 7/8/20  Yes Magalis Dinh MD   traZODone (DESYREL) 100 mg tablet Take 2 Tabs by mouth nightly as needed for Sleep. 7/8/20  Yes Magalis Dinh MD   amLODIPine (NORVASC) 5 mg tablet Take 1 Tab by mouth daily. 7/8/20  Yes Magalis Dinh MD   Blood Pressure Test Kit-Large ([employer] Talking Arm BP Monitr) kit Check bp daily 7/8/20  Yes Magalis Dinh MD   propranolol LA (INDERAL LA) 60 mg SR capsule Take 1 Cap by mouth daily. 7/7/20  Yes Stephanie PENA, STACIE   esomeprazole magnesium (NEXIUM PO) Take 20 mg by mouth two (2) times a day. Yes Provider, Historical   linaclotide (LINZESS PO) Take  by mouth. Yes Provider, Historical   omeprazole (PRILOSEC) 20 mg capsule Take 20 mg by mouth daily as needed (if heartburn persists after first dose). Yes Provider, Historical   cyclobenzaprine (FLEXERIL) 5 mg tablet Take 10 mg by mouth nightly as needed for Muscle Spasm(s). Yes Provider, Historical   busPIRone (BUSPAR) 7.5 mg tablet Take 1 Tab by mouth three (3) times daily.  Indications: repeated episodes of anxiety 6/8/20 7/8/20  Magalis Dinh MD   amLODIPine (NORVASC) 5 mg tablet Take 1 Tab by mouth daily. 6/5/20 7/8/20  Cely Castillo MD   HYDROcodone-acetaminophen (NORCO) 5-325 mg per tablet Take 1 Tab by mouth every twelve (12) hours as needed for Pain. 7/8/20  Provider, Historical   traZODone (DESYREL) 100 mg tablet Take 200 mg by mouth nightly as needed for Sleep. 7/8/20  Provider, Historical     Allergies   Allergen Reactions    Morphine Anaphylaxis     July 5, 2016 administered after surgery for pain.  Morphine Unknown (comments)    Other Medication Palpitations     Peripheral Block patient noted difficulty breathing and palpitations.        Patient Active Problem List   Diagnosis Code    SOB (shortness of breath) R06.02    Chronic back pain M54.9, G89.29    Obesity E66.9    Lyme disease A69.20    Anxiety F41.9    Diverticular disease K57.90    GERD (gastroesophageal reflux disease) K21.9    Foot pain, bilateral M79.671, M79.672    Unspecified hereditary and idiopathic peripheral neuropathy G60.9    TMJ tenderness M26.629    Sinusitis J32.9    New daily persistent headache G44.52    Chest pain with normal coronary angiography R07.9    Morbid obesity with BMI of 45.0-49.9, adult ([facility]) E66.01, Z68.42    Elevated pulse rate R00.0    Cellulitis and abscess of neck L03.221, L02.11    Controlled type 2 diabetes mellitus without complication ([facility]) H91.2    Sepsis ([facility]) A41.9    Migraine without status migrainosus, not intractable G43.909    Anxiety F41.9    Gastroparesis K31.84    Other hyperlipidemia E78.49    Controlled type 2 diabetes mellitus without complication, without long-term current use of insulin ([facility]) E11.9     Patient Active Problem List    Diagnosis Date Noted    Migraine without status migrainosus, not intractable 05/20/2020    Anxiety 05/20/2020    Gastroparesis 05/20/2020    Other hyperlipidemia 05/20/2020    Controlled type 2 diabetes mellitus without complication, without long-term current use of insulin ([facility] [address] 05/20/2020    Cellulitis and abscess of neck 12/03/2016    Controlled type 2 diabetes mellitus without complication ([facility]) 19/02/5785    Sepsis ([facility] 12/03/2016    Elevated pulse rate 08/31/2015    Chest pain with normal coronary angiography 07/28/2015    Morbid obesity with BMI of 45.0-49.9, adult ([facility] 07/28/2015    TMJ tenderness 06/01/2015    Sinusitis 06/01/2015    New daily persistent headache 06/01/2015    Foot pain, bilateral 04/08/2015    Unspecified hereditary and idiopathic peripheral neuropathy 04/08/2015    Chronic back pain 03/13/2014    Obesity 03/13/2014    Lyme disease 03/13/2014    Anxiety 03/13/2014    Diverticular disease 03/13/2014    GERD (gastroesophageal reflux disease) 03/13/2014    SOB (shortness of breath) 03/12/2014     Current Outpatient Medications   Medication Sig Dispense Refill    albuterol (PROVENTIL HFA, VENTOLIN HFA, PROAIR HFA) 90 mcg/actuation inhaler       busPIRone (BUSPAR) 10 mg tablet Take 1 Tab by mouth three (3) times daily. Indications: repeated episodes of anxiety 90 Tab 5    diph,Pertuss,Acell,,Tet Vac-PF (ADACEL) 2 Lf-(2.5-5-3-5 mcg)-5Lf/0.5 mL susp 0.5 mL by IntraMUSCular route once for 1 dose. 1 Syringe 0    diclofenac EC (VOLTAREN) 50 mg EC tablet Take 1 Tab by mouth two (2) times a day. 30 Tab 0    traZODone (DESYREL) 100 mg tablet Take 2 Tabs by mouth nightly as needed for Sleep. 60 Tab 5    amLODIPine (NORVASC) 5 mg tablet Take 1 Tab by mouth daily. 90 Tab 0    Blood Pressure Test Kit-Large ([employer] Talking Arm BP Monitr) kit Check bp daily 1 Kit 0    propranolol LA (INDERAL LA) 60 mg SR capsule Take 1 Cap by mouth daily. 30 Cap 2    esomeprazole magnesium (NEXIUM PO) Take 20 mg by mouth two (2) times a day.  linaclotide (LINZESS PO) Take  by mouth.  omeprazole (PRILOSEC) 20 mg capsule Take 20 mg by mouth daily as needed (if heartburn persists after first dose).       cyclobenzaprine (FLEXERIL) 5 mg tablet Take 10 mg by mouth nightly as needed for Muscle Spasm(s). Allergies   Allergen Reactions    Morphine Anaphylaxis     2016 administered after surgery for pain.  Morphine Unknown (comments)    Other Medication Palpitations     Peripheral Block patient noted difficulty breathing and palpitations. Past Medical History:   Diagnosis Date    Arrhythmia     PAC    Arthritis     Chest pain     Diabetes ([facility] [address] [facility]     GERD (gastroesophageal reflux disease)     H/O seasonal allergies     Headache     Ill-defined condition     \"twisted\" kidney right    Ill-defined condition     cellulitis    Lyme disease     Obesity     Plantar fasciitis     PUD (peptic ulcer disease)     Trigger finger, right index finger 2016    Vitamin D deficiency      Past Surgical History:   Procedure Laterality Date    HX GASTRIC BYPASS      HX GI      HX HEART CATHETERIZATION  7/28/15    HX ORTHOPAEDIC  7-30-15    Rt. rotator cuff repair     HX TONSILLECTOMY      TX COLSC FLX W/NDSC US XM RCTM ET AL LMTD&ADJ [facility]       Family History   Problem Relation Age of Onset    Cancer Paternal Aunt         colon polyps    Cancer Paternal Uncle         colon cancer    Stroke Mother     Heart Disease Mother     Elevated Lipids Mother     Cancer Father         prostate    Heart Disease Father         death by MI    Coronary Artery Disease Other         mother  64, father age 67 of MI    Other Brother         Passed while sleeping at 64, neck blockages and legs, hx of vascular surg    Hypertension Brother     Diabetes Brother     Anesth Problems Neg Hx      Social History     Tobacco Use    Smoking status: Current Some Day Smoker     Packs/day: 0.50    Smokeless tobacco: Never Used   Substance Use Topics    Alcohol use: Not Currently     Comment: rare       Review of Systems   Constitutional: Negative. HENT: Negative. Eyes: Negative. Respiratory: Negative. Cardiovascular: Negative. Gastrointestinal: Negative. Genitourinary: Negative. Musculoskeletal: Negative. Skin: Negative. BRUISE, CUTS ON LEFT ARM   Neurological: Negative. Endo/Heme/Allergies: Negative. Psychiatric/Behavioral: The patient is nervous/anxious and has insomnia. Objective:   Vital Signs: (As obtained by patient/caregiver at home)  There were no vitals taken for this visit. [INSTRUCTIONS:  \"[x]\" Indicates a positive item  \"[]\" Indicates a negative item  -- DELETE ALL ITEMS NOT EXAMINED]    Constitutional: [x] Appears well-developed and well-nourished [x] No apparent distress      [] Abnormal -     Mental status: [x] Alert and awake  [x] Oriented to person/place/time [x] Able to follow commands    [] Abnormal -     Eyes:   EOM    [x]  Normal    [] Abnormal -   Sclera  [x]  Normal    [] Abnormal -          Discharge [x]  None visible   [] Abnormal -     HENT: [x] Normocephalic, atraumatic  [] Abnormal -   [x] Mouth/Throat: Mucous membranes are moist    External Ears [x] Normal  [] Abnormal -    Neck: [x] No visualized mass [] Abnormal -     Pulmonary/Chest: [x] Respiratory effort normal   [x] No visualized signs of difficulty breathing or respiratory distress        [] Abnormal -      Musculoskeletal:   [x] Normal gait with no signs of ataxia         [x] Normal range of motion of neck        [] Abnormal -     Neurological:        [x] No Facial Asymmetry (Cranial nerve 7 motor function) (limited exam due to video visit)          [x] No gaze palsy        [] Abnormal -          Skin:        [] No significant exanthematous lesions or discoloration noted on facial skin         [x] Abnormal - bruise noted on left arm. Cuts noted on left arm. Psychiatric:       [x] Normal Affect [] Abnormal -        [x] No Hallucinations    Other pertinent observable physical exam findings:-        We discussed the expected course, resolution and complications of the diagnosis(es) in detail.   Medication risks, benefits, costs, interactions, and alternatives were discussed as indicated. I advised her to contact the office if her condition worsens, changes or fails to improve as anticipated. She expressed understanding with the diagnosis(es) and plan. Chiquita Candelaria is a 50 y.o. female being evaluated by a video visit encounter for concerns as above. A caregiver was present when appropriate. Due to this being a TeleHealth encounter (During [facility] public [facility] emergency), evaluation of the following organ systems was limited: Vitals/Constitutional/EENT/Resp/CV/GI//MS/Neuro/Skin/Heme-Lymph-Imm. Pursuant to the emergency declaration under the [facility] waiver authority and the [employer] Act, this Virtual  Visit was conducted, with patient's (and/or legal guardian's) consent, to reduce the patient's risk of exposure to COVID-19 and provide necessary medical care. Services were provided through a video synchronous discussion virtually to substitute for in-person clinic visit. Patient was located at [facility]. I was at home while conducting this encounter.        Emerita Nunez MD

## 2020-07-08 NOTE — PROGRESS NOTES
Patient stayed name &   Chief Complaint   Patient presents with   [facility] Follow Up     Nevaeh Fierro H/O     Discharged 20     Hematoma    Medication Refill     Trazadone, Buspirone-dosage needs to be changed        Health Maintenance Due   Topic    Pneumococcal 0-64 years (1 of 1 - PPSV23)    Foot Exam Q1     MICROALBUMIN Q1     Eye Exam Retinal or Dilated     DTaP/Tdap/Td series (1 - Tdap)    Lipid Screen     A1C test (Diabetic or Prediabetic)        Wt Readings from Last 3 Encounters:   20 181 lb (82.1 kg)   20 179 lb (81.2 kg)   06/10/20 182 lb 3.2 oz (82.6 kg)     Temp Readings from Last 3 Encounters:   20 97 °F (36.1 °C) (Temporal)   06/10/20 98.1 °F (36.7 °C)   16 97.6 °F (36.4 °C)     BP Readings from Last 3 Encounters:   20 (!) 154/108   20 (!) 141/95   06/10/20 138/68     Pulse Readings from Last 3 Encounters:   20 91   06/10/20 72   20 76         Learning Assessment:  :     Learning Assessment 10/21/2016 3/12/2014   PRIMARY LEARNER Patient Patient   HIGHEST LEVEL OF EDUCATION - PRIMARY LEARNER  - GRADUATED HIGH SCHOOL OR GED   BARRIERS PRIMARY LEARNER - READING   CO-LEARNER CAREGIVER - Yes   CO-LEARNER NAME - [address]    NEED - No   LEARNER PREFERENCE PRIMARY DEMONSTRATION LISTENING   LEARNING SPECIAL TOPICS - SOB why   ANSWERED BY Patient patient   RELATIONSHIP SELF SELF       Depression Screening:  :     3 most recent PHQ Screens 6/10/2020   Little interest or pleasure in doing things Not at all   Feeling down, depressed, irritable, or hopeless Not at all   Total Score PHQ 2 0       Fall Risk Assessment:  :     No flowsheet data found. Abuse Screening:  :     No flowsheet data found. Coordination of Care Questionnaire:  :     1) Have you been to an emergency room, urgent care clinic since your last visit? No    Hospitalized since your last visit?  Yes Johnpraveen Fierro last week Hematoma             2) Have you seen or consulted any other health care providers outside of [address] [place] [address] since your last visit? Yes see above  (Include any pap smears or colon screenings in this section.)    Patient is accompanied by  I have received verbal consent from America Rodríguez to discuss any/all medical information while they are present in the room.

## 2020-07-10 PROBLEM — H91.90 HEARING LOSS: Status: ACTIVE | Noted: 2020-07-10

## 2020-07-14 ENCOUNTER — HOSPITAL ENCOUNTER (OUTPATIENT)
Dept: LAB | Age: 45
Discharge: HOME | End: 2020-07-14

## 2020-07-14 ENCOUNTER — OFFICE VISIT (OUTPATIENT)
Dept: FAMILY MEDICINE CLINIC | Age: 45
End: 2020-07-14

## 2020-07-14 VITALS
HEIGHT: 64 IN | RESPIRATION RATE: 16 BRPM | OXYGEN SATURATION: 98 % | BODY MASS INDEX: 30.9 KG/M2 | TEMPERATURE: 98.6 F | WEIGHT: 181 LBS | SYSTOLIC BLOOD PRESSURE: 152 MMHG | HEART RATE: 79 BPM | DIASTOLIC BLOOD PRESSURE: 96 MMHG

## 2020-07-14 DIAGNOSIS — E11.9 TYPE 2 DIABETES MELLITUS WITHOUT COMPLICATION, WITHOUT LONG-TERM CURRENT USE OF INSULIN (HCC): ICD-10-CM

## 2020-07-14 DIAGNOSIS — S50.12XA CONTUSION OF LEFT FOREARM, INITIAL ENCOUNTER: Primary | ICD-10-CM

## 2020-07-14 RX ORDER — CEPHALEXIN 500 MG/1
500 CAPSULE ORAL 2 TIMES DAILY
Qty: 14 CAP | Refills: 0 | Status: SHIPPED | OUTPATIENT
Start: 2020-07-14 | End: 2020-07-21

## 2020-07-14 NOTE — PROGRESS NOTES
Redge Holter is a 50 y.o. female who presents to clinic today for the following:    Chief Complaint   Patient presents with    Arm Injury     painful , injured ir 2 weeks     Medication Refill       Vitals:    07/14/20 0926   BP: (!) 152/96   Pulse: 79   Resp: 16   Temp: 98.6 °F (37 °C)   TempSrc: Oral   SpO2: 98%   Weight: 181 lb (82.1 kg)   Height: 5' 4\" (1.626 m)       Body mass index is 31.07 kg/m². Patients past medical, surgical and family histories were reviewed. Allergies and Medications reviewed and updated. Current Outpatient Medications:     cephALEXin (KEFLEX) 500 mg capsule, Take 1 Cap by mouth two (2) times a day for 7 days. Indications: an infection of the skin and the tissue below the skin, Disp: 14 Cap, Rfl: 0    albuterol (PROVENTIL HFA, VENTOLIN HFA, PROAIR HFA) 90 mcg/actuation inhaler, , Disp: , Rfl:     busPIRone (BUSPAR) 10 mg tablet, Take 1 Tab by mouth three (3) times daily. Indications: repeated episodes of anxiety, Disp: 90 Tab, Rfl: 5    diclofenac EC (VOLTAREN) 50 mg EC tablet, Take 1 Tab by mouth two (2) times a day., Disp: 30 Tab, Rfl: 0    traZODone (DESYREL) 100 mg tablet, Take 2 Tabs by mouth nightly as needed for Sleep., Disp: 60 Tab, Rfl: 5    amLODIPine (NORVASC) 5 mg tablet, Take 1 Tab by mouth daily. , Disp: 90 Tab, Rfl: 0    propranolol LA (INDERAL LA) 60 mg SR capsule, Take 1 Cap by mouth daily. , Disp: 30 Cap, Rfl: 2    esomeprazole magnesium (NEXIUM PO), Take 20 mg by mouth two (2) times a day., Disp: , Rfl:     linaclotide (LINZESS PO), Take  by mouth., Disp: , Rfl:     omeprazole (PRILOSEC) 20 mg capsule, Take 20 mg by mouth daily as needed (if heartburn persists after first dose). , Disp: , Rfl:     cyclobenzaprine (FLEXERIL) 5 mg tablet, Take 10 mg by mouth nightly as needed for Muscle Spasm(s). , Disp: , Rfl:     Blood Pressure Test Kit-Large ([employer] Talking Arm BP Monitr) kit, Check bp daily, Disp: 1 Kit, Rfl: 0    Allergies   Allergen Reactions    Morphine Anaphylaxis     2016 administered after surgery for pain.  Morphine Unknown (comments)    Other Medication Palpitations     Peripheral Block patient noted difficulty breathing and palpitations.        Past Medical History:   Diagnosis Date    Arrhythmia     PAC    Arthritis     Chest pain     Diabetes ([address]     GERD (gastroesophageal reflux disease)     H/O seasonal allergies     Headache     Hearing loss 7/10/2020    Ill-defined condition     \"twisted\" kidney right    Ill-defined condition     cellulitis    Lyme disease     Obesity     Plantar fasciitis     PUD (peptic ulcer disease)     Trigger finger, right index finger 2016    Vitamin D deficiency        Past Surgical History:   Procedure Laterality Date    HX GASTRIC BYPASS      HX GI      HX HEART CATHETERIZATION  7/28/15    HX ORTHOPAEDIC  7-30-15    Rt. rotator cuff repair     HX TONSILLECTOMY      [place] COLSC FLX W/NDSC US XM RCTM ET AL LMTD&ADJ [facility]         Family History   Problem Relation Age of Onset    Cancer Paternal Aunt         colon polyps    Cancer Paternal Uncle         colon cancer    Stroke Mother     Heart Disease Mother     Elevated Lipids Mother     Cancer Father         prostate    Heart Disease Father         death by MI    Coronary Artery Disease Other         mother  64, father age 67 of MI    Other Brother         Passed while sleeping at 64, neck blockages and legs, hx of vascular surg    Hypertension Brother     Diabetes Brother     Anesth Problems Neg Hx        Social History     Socioeconomic History    Marital status:      Spouse name: Not on file    Number of children: Not on file    Years of education: Not on file    Highest education level: Not on file   Occupational History    Not on file   Social Needs    Financial resource strain: Not on file    Food insecurity     Worry: Not on file     Inability: Not on file   [place] needs     Medical: Not on file     Non-medical: Not on file   Tobacco Use    Smoking status: Current Some Day Smoker     Packs/day: 0.50    Smokeless tobacco: Never Used   Substance and Sexual Activity    Alcohol use: Not Currently     Comment: rare    Drug use: Never    Sexual activity: Yes     Partners: Male   Lifestyle    Physical activity     Days per week: Not on file     Minutes per session: Not on file    Stress: Not on file   Relationships    Social connections     Talks on phone: Not on file     Gets together: Not on file     Attends Jain service: Not on file     Active member of club or organization: Not on file     Attends meetings of clubs or organizations: Not on file     Relationship status: Not on file    Intimate partner violence     Fear of current or ex partner: Not on file     Emotionally abused: Not on file     Physically abused: Not on file     Forced sexual activity: Not on file   Other Topics Concern    Not on file   Social History Narrative    ** Merged History Encounter **                Physical Exam  Constitutional:       Appearance: Normal appearance. She is obese. HENT:      Head: Normocephalic and atraumatic. Nose: Nose normal.      Mouth/Throat:      Mouth: Mucous membranes are moist.      Pharynx: Oropharynx is clear. Eyes:      Pupils: Pupils are equal, round, and reactive to light. Neck:      Musculoskeletal: Normal range of motion and neck supple. Cardiovascular:      Rate and Rhythm: Normal rate and regular rhythm. Pulmonary:      Effort: Pulmonary effort is normal.      Breath sounds: Normal breath sounds. Abdominal:      General: Abdomen is flat. Musculoskeletal:         General: Signs of injury present. Skin:     General: Skin is warm. Capillary Refill: Capillary refill takes less than 2 seconds. Findings: Bruising and signs of injury present.              Comments: Large hematoma, mild drainage, no streaking or odor, bruising thoughout   Neurological:      General: No focal deficit present. Mental Status: She is alert and oriented to person, place, and time. Mental status is at baseline. Psychiatric:         Mood and Affect: Mood normal.         Behavior: Behavior normal.          Pt seen today in office, pt fell in home 2 weeks ago after syncope, went to ER, x rays reported negative. Pt has large left lower arm hematoma that is painfull, mild draining and reported looking worse. Pt has follow up with Neuro and has upcoming test.  Dr. Shruti Curran evaluated wound, I have given pt Antibiotic due to redness and drainage. Pt will watch over next several weeks, any increase size redness, streaking, odor--go to ER        Review of Systems   Constitutional: Negative. Eyes: Negative. Respiratory: Negative. Cardiovascular: Negative. Gastrointestinal: Negative. Musculoskeletal: Positive for falls. Skin:        Left arm hematoma   Neurological: Negative. Endo/Heme/Allergies: Negative. Psychiatric/Behavioral: Negative. No results found. No results found for this or any previous visit (from the past 24 hour(s)). Assessment and Plan:    Encounter Diagnoses   Name Primary?  Contusion of left forearm, initial encounter Yes    Type 2 diabetes mellitus without complication, without long-term current use of insulin ([facility] [address]                 I have discussed the diagnosis with the patient and the intended plan as seen in the above orders. she has expressed understanding. The patient has received an after-visit summary and questions were answered concerning future plans. I have discussed medication side effects and warnings with the patient as well.         Electronically Signed: Eddie Valle NP

## 2020-07-14 NOTE — PROGRESS NOTES
Identified pt with two pt identifiers(name and ). Reviewed record in preparation for visit and have obtained necessary documentation. Chief Complaint   Patient presents with    Arm Injury     painful , injured ir 2 weeks     Medication Refill        Health Maintenance Due   Topic    Pneumococcal 0-64 years (1 of 1 - PPSV23)    Foot Exam Q1     MICROALBUMIN Q1     Eye Exam Retinal or Dilated     DTaP/Tdap/Td series (1 - Tdap)    Lipid Screen     A1C test (Diabetic or Prediabetic)        Visit Vitals  Blood Pressure (Abnormal) 152/96 (BP 1 Location: Left arm, BP Patient Position: Sitting)   Pulse 79   Temperature 98.6 °F (37 °C) (Oral)   Respiration 16   Height 5' 4\" (1.626 m)   Weight 181 lb (82.1 kg)   Oxygen Saturation 98%   Body Mass Index 31.07 kg/m²         Coordination of Care Questionnaire:  :   1) Have you been to an emergency room, urgent care, or hospitalized since your last visit? NO      2. Have seen or consulted any other health care provider since your last visit? NO          Patient is accompanied by  I have received verbal consent from Olegario Bejarano to discuss any/all medical information while they are present in the room.

## 2020-07-15 LAB
CHOLEST SERPL-MCNC: 218 MG/DL
CREAT UR-MCNC: 214 MG/DL
EST. AVERAGE GLUCOSE BLD GHB EST-MCNC: 105 MG/DL
HBA1C MFR BLD: 5.3 % (ref 4–5.6)
HDLC SERPL-MCNC: 112 MG/DL
HDLC SERPL: 1.9 {RATIO} (ref 0–5)
LDLC SERPL CALC-MCNC: 88 MG/DL (ref 0–100)
LIPID PROFILE,FLP: ABNORMAL
MICROALBUMIN UR-MCNC: 3.87 MG/DL
MICROALBUMIN/CREAT UR-RTO: 18 MG/G (ref 0–30)
TRIGL SERPL-MCNC: 90 MG/DL (ref ?–150)
VLDLC SERPL CALC-MCNC: 18 MG/DL

## 2020-07-20 ENCOUNTER — ED HISTORICAL/CONVERTED ENCOUNTER (OUTPATIENT)
Dept: OTHER | Age: 45
End: 2020-07-20

## 2020-07-22 ENCOUNTER — OFFICE VISIT (OUTPATIENT)
Dept: NEUROLOGY | Age: 45
End: 2020-07-22

## 2020-07-22 VITALS — TEMPERATURE: 96.2 F

## 2020-07-22 DIAGNOSIS — R55 SYNCOPE, UNSPECIFIED SYNCOPE TYPE: Primary | ICD-10-CM

## 2020-07-22 NOTE — PROGRESS NOTES
Amenorrhea note      Ginger Gates is a 50 y.o. female who complains of irregular menses. Her current method of family planning is none. The patient is sexually active. Patient's last menstrual period was 2020. Seen for new pt AE last month, 20. At that visit reported amenorrhea, with LMP in October. Periods were irregular prior to them stopping. Labs drawn at that visit:  TSH=1.340  PRL=8.2  FHS=19  LH=16  R1=326    Had a period about 2 weeks ago. The period lasted about 5 days (rather than her usual 3d) and flow was moderate. Changed pads 2-3x/d. No cramping. No clotting    Additional symptoms include none. No vasomotor sx. Last Gyn testin2020 pap smear (normal with neg HPV). Intermittent CP and palpitations. Establishing care with new cardiologist.  Is scheduled for stress test next month. Following up with her PCP next week.     Her relevant past medical history:   Past Medical History:   Diagnosis Date    Arrhythmia     PAC    Arthritis     Cellulitis     cellulitis    Chest pain     Diabetes ([address]     GERD (gastroesophageal reflux disease)     H/O seasonal allergies     Headache     Hearing loss 7/10/2020    Ill-defined condition     \"twisted\" kidney right    Lyme disease     Obesity     Plantar fasciitis     PUD (peptic ulcer disease)     Trigger finger, right index finger 2016    Vitamin D deficiency         Past Surgical History:   Procedure Laterality Date    HX GASTRIC BYPASS      HX GI      HX HEART CATHETERIZATION  7/28/15    HX ORTHOPAEDIC  7-30-15    Rt. rotator cuff repair     HX TONSILLECTOMY      [place] COLSC FLX W/NDSC US XM RCTM ET AL LMTD&ADJ [facility]       Social History     Occupational History    Not on file   Tobacco Use    Smoking status: Current Some Day Smoker     Packs/day: 0.50    Smokeless tobacco: Never Used   Substance and Sexual Activity    Alcohol use: Not Currently     Comment: rare    Drug use: Never    Sexual activity: Yes     Partners: Male     Family History   Problem Relation Age of Onset    Cancer Paternal Aunt         colon polyps    Cancer Paternal Uncle         colon cancer    Stroke Mother     Heart Disease Mother     Elevated Lipids Mother     Cancer Father         prostate    Heart Disease Father         death by MI    Coronary Artery Disease Other         mother  64, father age 67 of MI    Other Brother         Passed while sleeping at 64, neck blockages and legs, hx of vascular surg    Hypertension Brother     Diabetes Brother     Anesth Problems Neg Hx        Allergies   Allergen Reactions    Morphine Anaphylaxis     2016 administered after surgery for pain.  Morphine Unknown (comments)    Other Medication Palpitations     Peripheral Block patient noted difficulty breathing and palpitations. Prior to Admission medications    Medication Sig Start Date End Date Taking? Authorizing Provider   albuterol (PROVENTIL HFA, VENTOLIN HFA, PROAIR HFA) 90 mcg/actuation inhaler  3/22/20  Yes Provider, Historical   busPIRone (BUSPAR) 10 mg tablet Take 1 Tab by mouth three (3) times daily. Indications: repeated episodes of anxiety 20  Yes Dana Senior MD   diclofenac EC (VOLTAREN) 50 mg EC tablet Take 1 Tab by mouth two (2) times a day. 20  Yes Dana Senior MD   traZODone (DESYREL) 100 mg tablet Take 2 Tabs by mouth nightly as needed for Sleep. 20  Yes Dana Senior MD   amLODIPine (NORVASC) 5 mg tablet Take 1 Tab by mouth daily. 20  Yes Dana Senior MD   Blood Pressure Test Kit-Large ([employer] Talking Arm BP Monitr) kit Check bp daily 20  Yes Dana Senior MD   propranolol LA (INDERAL LA) 60 mg SR capsule Take 1 Cap by mouth daily. 20  Yes Varinder Campbell NP   omeprazole (PRILOSEC) 20 mg capsule Take 20 mg by mouth daily as needed (if heartburn persists after first dose).    Yes Provider, Historical   cyclobenzaprine (FLEXERIL) 5 mg tablet Take 10 mg by mouth nightly as needed for Muscle Spasm(s). Yes Provider, Historical   esomeprazole magnesium (NEXIUM PO) Take 20 mg by mouth two (2) times a day. Provider, Historical   linaclotide (LINZESS PO) Take  by mouth. Provider, Historical        Review of Systems - History obtained from the patient  Constitutional: negative for weight loss, fever, night sweats  HEENT: negative for hearing loss, earache, congestion, snoring, sorethroat  CV: +CP (intermittent), palpitations  Resp: negative for cough, shortness of breath, wheezing  Breast: negative for breast lumps, nipple discharge, galactorrhea  GI: negative for change in bowel habits, abdominal pain, black or bloody stools  : negative for frequency, dysuria, hematuria  MSK: negative for back pain, joint pain, muscle pain  Skin: negative for itching, rash, hives  Neuro: negative for dizziness, headache, confusion, weakness  Psych: negative for anxiety, depression, change in mood  Heme/lymph: negative for bleeding, bruising, pallor      Objective:  Visit Vitals  /83 (BP 1 Location: Left arm, BP Patient Position: Sitting)   Ht 5' 4\" (1.626 m)   Wt 180 lb (81.6 kg)   LMP 07/09/2020   BMI 30.90 kg/m²          PHYSICAL EXAMINATION    Constitutional  · Appearance: well-nourished, well developed, alert, in no acute distress    HENT  · Head and Face: appears normal      Skin  · General Inspection: no rash, no lesions identified    Neurologic/Psychiatric  · Mental Status:  · Orientation: grossly oriented to person, place and time  · Mood and Affect: mood normal, affect appropriate    Assessment:   Irregular menses  Not menopausal    Plan:   Recommend EMB. Offered to do today. Pt declines, prefers to schedule when she is expecting it. Can schedule for early August (I am out of the office next week). Menstrual calendar given. Discussed cyclic progestin if benign. Probably need to avoid estrogen d/t cardiac issues.

## 2020-07-23 ENCOUNTER — OFFICE VISIT (OUTPATIENT)
Dept: OBGYN CLINIC | Age: 45
End: 2020-07-23

## 2020-07-23 VITALS
DIASTOLIC BLOOD PRESSURE: 83 MMHG | SYSTOLIC BLOOD PRESSURE: 135 MMHG | WEIGHT: 180 LBS | HEIGHT: 64 IN | BODY MASS INDEX: 30.73 KG/M2

## 2020-07-23 DIAGNOSIS — N92.6 IRREGULAR MENSES: Primary | ICD-10-CM

## 2020-07-23 RX ORDER — MEDROXYPROGESTERONE ACETATE 10 MG/1
10 TABLET ORAL DAILY
Qty: 10 TAB | Refills: 0 | Status: CANCELLED | OUTPATIENT
Start: 2020-07-23 | End: 2020-08-02

## 2020-07-28 ENCOUNTER — HOSPITAL ENCOUNTER (OUTPATIENT)
Dept: LAB | Age: 45
Discharge: HOME | End: 2020-07-28

## 2020-07-28 ENCOUNTER — OFFICE VISIT (OUTPATIENT)
Dept: FAMILY MEDICINE CLINIC | Age: 45
End: 2020-07-28

## 2020-07-28 VITALS
WEIGHT: 180 LBS | TEMPERATURE: 97.7 F | BODY MASS INDEX: 30.73 KG/M2 | HEIGHT: 64 IN | DIASTOLIC BLOOD PRESSURE: 86 MMHG | SYSTOLIC BLOOD PRESSURE: 134 MMHG | OXYGEN SATURATION: 97 % | HEART RATE: 81 BPM | RESPIRATION RATE: 16 BRPM

## 2020-07-28 DIAGNOSIS — R55 SYNCOPE AND COLLAPSE: Primary | ICD-10-CM

## 2020-07-28 DIAGNOSIS — R55 SYNCOPE AND COLLAPSE: ICD-10-CM

## 2020-07-28 LAB
ALBUMIN SERPL-MCNC: 3.7 G/DL (ref 3.5–5)
ALBUMIN/GLOB SERPL: 1.1 {RATIO} (ref 1.1–2.2)
ALP SERPL-CCNC: 74 U/L (ref 45–117)
ALT SERPL-CCNC: 55 U/L (ref 12–78)
ANION GAP SERPL CALC-SCNC: 8 MMOL/L (ref 5–15)
AST SERPL-CCNC: 78 U/L (ref 15–37)
BASOPHILS # BLD: 0 K/UL (ref 0–0.1)
BASOPHILS NFR BLD: 1 % (ref 0–1)
BILIRUB SERPL-MCNC: 0.5 MG/DL (ref 0.2–1)
BUN SERPL-MCNC: 6 MG/DL (ref 6–20)
BUN/CREAT SERPL: 10 (ref 12–20)
CALCIUM SERPL-MCNC: 8.6 MG/DL (ref 8.5–10.1)
CHLORIDE SERPL-SCNC: 106 MMOL/L (ref 97–108)
CHOLEST SERPL-MCNC: 218 MG/DL
CO2 SERPL-SCNC: 28 MMOL/L (ref 21–32)
CREAT SERPL-MCNC: 0.62 MG/DL (ref 0.55–1.02)
DIFFERENTIAL METHOD BLD: ABNORMAL
EOSINOPHIL # BLD: 0 K/UL (ref 0–0.4)
EOSINOPHIL NFR BLD: 1 % (ref 0–7)
ERYTHROCYTE [DISTWIDTH] IN BLOOD BY AUTOMATED COUNT: 12.9 % (ref 11.5–14.5)
EST. AVERAGE GLUCOSE BLD GHB EST-MCNC: 97 MG/DL
GLOBULIN SER CALC-MCNC: 3.3 G/DL (ref 2–4)
GLUCOSE SERPL-MCNC: 85 MG/DL (ref 65–100)
HBA1C MFR BLD: 5 % (ref 4–5.6)
HCT VFR BLD AUTO: 45.2 % (ref 35–47)
HDLC SERPL-MCNC: 103 MG/DL
HDLC SERPL: 2.1 {RATIO} (ref 0–5)
HGB BLD-MCNC: 15.6 G/DL (ref 11.5–16)
IMM GRANULOCYTES # BLD AUTO: 0 K/UL (ref 0–0.04)
IMM GRANULOCYTES NFR BLD AUTO: 0 % (ref 0–0.5)
LDLC SERPL CALC-MCNC: 93.2 MG/DL (ref 0–100)
LIPID PROFILE,FLP: ABNORMAL
LYMPHOCYTES # BLD: 1.2 K/UL (ref 0.8–3.5)
LYMPHOCYTES NFR BLD: 41 % (ref 12–49)
MCH RBC QN AUTO: 33.8 PG (ref 26–34)
MCHC RBC AUTO-ENTMCNC: 34.5 G/DL (ref 30–36.5)
MCV RBC AUTO: 97.8 FL (ref 80–99)
MONOCYTES # BLD: 0.4 K/UL (ref 0–1)
MONOCYTES NFR BLD: 14 % (ref 5–13)
NEUTS SEG # BLD: 1.2 K/UL (ref 1.8–8)
NEUTS SEG NFR BLD: 43 % (ref 32–75)
NRBC # BLD: 0 K/UL (ref 0–0.01)
NRBC BLD-RTO: 0 PER 100 WBC
PLATELET # BLD AUTO: 206 K/UL (ref 150–400)
PMV BLD AUTO: 10.4 FL (ref 8.9–12.9)
POTASSIUM SERPL-SCNC: 3.9 MMOL/L (ref 3.5–5.1)
PROT SERPL-MCNC: 7 G/DL (ref 6.4–8.2)
RBC # BLD AUTO: 4.62 M/UL (ref 3.8–5.2)
SODIUM SERPL-SCNC: 142 MMOL/L (ref 136–145)
TRIGL SERPL-MCNC: 109 MG/DL (ref ?–150)
TSH SERPL DL<=0.05 MIU/L-ACNC: 0.81 UIU/ML (ref 0.36–3.74)
VLDLC SERPL CALC-MCNC: 21.8 MG/DL
WBC # BLD AUTO: 2.8 K/UL (ref 3.6–11)

## 2020-07-28 RX ORDER — ATENOLOL 50 MG/1
TABLET ORAL
COMMUNITY
Start: 2020-05-21

## 2020-07-28 NOTE — PROGRESS NOTES
Identified pt with two pt identifiers(name and ). Reviewed record in preparation for visit and have obtained necessary documentation. Chief Complaint   Patient presents with   Yvette Granados     last Tuesday fell while getting out of the pool        Health Maintenance Due   Topic    Pneumococcal 0-64 years (1 of 1 - PPSV23)    Foot Exam Q1     Eye Exam Retinal or Dilated     DTaP/Tdap/Td series (1 - Tdap)       Visit Vitals  Blood Pressure 134/86 (BP 1 Location: Left arm, BP Patient Position: Sitting)   Pulse 81   Temperature 97.7 °F (36.5 °C) (Oral)   Respiration 16   Height 5' 4\" (1.626 m)   Weight 180 lb (81.6 kg)   Oxygen Saturation 97%   Body Mass Index 30.90 kg/m²         Coordination of Care Questionnaire:  :   1) Have you been to an emergency room, urgent care, or hospitalized since your last visit? YES      2. Have seen or consulted any other health care provider since your last visit? [facility] 7-20          Patient is accompanied by  I have received verbal consent from Aamir Mijares to discuss any/all medical information while they are present in the room. Elizabeth Miller

## 2020-07-28 NOTE — PATIENT INSTRUCTIONS
Patient seen today for a syncopal episode from the 20th of July. Pt was seen at [facility], was evaluated for follow up. Pt has follow up with Neurology for Syncope. Advised to drink fluyids with Potassium in them as her level was low last week. Labs drawn today, we will follow up with results. Fainting: Care Instructions Your Care Instructions When you faint, or pass out, you lose consciousness for a short time. A brief drop in blood flow to the brain often causes it. When you fall or lie down, more blood flows to your brain and you regain consciousness. Emotional stress, pain, or overheatingespecially if you have been standingcan make you faint. In these cases, fainting is usually not serious. But fainting can be a sign of a more serious problem. Your doctor may want you to have more tests to rule out other causes. The treatment you need depends on the reason why you fainted. The doctor has checked you carefully, but problems can develop later. If you notice any problems or new symptoms, get medical treatment right away. Follow-up care is a key part of your treatment and safety. Be sure to make and go to all appointments, and call your doctor if you are having problems. It's also a good idea to know your test results and keep a list of the medicines you take. How can you care for yourself at home? · Drink plenty of fluids to prevent dehydration. If you have kidney, heart, or liver disease and have to limit fluids, talk with your doctor before you increase your fluid intake. When should you call for help? [id number] anytime you think you may need emergency care. For example, call if: 
· You have symptoms of a heart problem. These may include: 
? Chest pain or pressure. ? Severe trouble breathing. ? A fast or irregular heartbeat. ? Lightheadedness or sudden weakness. ? Coughing up pink, foamy mucus. ? Passing out.  
After you call 911, the  may tell you to chew 1 adult-strength or 2 to 4 low-dose aspirin. Wait for an ambulance. Do not try to drive yourself. · You have symptoms of a stroke. These may include: 
? Sudden numbness, tingling, weakness, or loss of movement in your face, arm, or leg, especially on only one side of your body. ? Sudden vision changes. ? Sudden trouble speaking. ? Sudden confusion or trouble understanding simple statements. ? Sudden problems with walking or balance. ? A sudden, severe headache that is different from past headaches. · You passed out (lost consciousness) again. Watch closely for changes in your health, and be sure to contact your doctor if: 
· You do not get better as expected. Where can you learn more? Go to http://katerina-bryson.info/ Enter L515 in the search box to learn more about \"Fainting: Care Instructions. \" Current as of: June 26, 2019               Content Version: 12.5 © 0884-0300 [employer]. Care instructions adapted under license by [employer] (which disclaims liability or warranty for this information). If you have questions about a medical condition or this instruction, always ask your healthcare professional. [place] [address] any warranty or liability for your use of this information. Lightheadedness or Faintness: Care Instructions Your Care Instructions Lightheadedness is a feeling that you are about to faint or \"pass out. \" You do not feel as if you or your surroundings are moving. It is different from vertigo, which is the feeling that you or things around you are spinning or tilting. Lightheadedness usually goes away or gets better when you lie down. If lightheadedness gets worse, it can lead to a fainting spell. It is common to feel lightheaded from time to time. Lightheadedness usually is not caused by a serious problem.  It often is caused by a short-lasting drop in blood pressure and blood flow to your head that occurs when you get up too quickly from a seated or lying position. Follow-up care is a key part of your treatment and safety. Be sure to make and go to all appointments, and call your doctor if you are having problems. It's also a good idea to know your test results and keep a list of the medicines you take. How can you care for yourself at home? · Lie down for 1 or 2 minutes when you feel lightheaded. After lying down, sit up slowly and remain sitting for 1 to 2 minutes before slowly standing up. · Avoid movements, positions, or activities that have made you lightheaded in the past. 
· Get plenty of rest, especially if you have a cold or flu, which can cause lightheadedness. · Make sure you drink plenty of fluids, especially if you have a fever or have been sweating. · Do not drive or put yourself and others in danger while you feel lightheaded. When should you call for help? [id number] anytime you think you may need emergency care. For example, call if: 
· You have symptoms of a stroke. These may include: 
? Sudden numbness, tingling, weakness, or loss of movement in your face, arm, or leg, especially on only one side of your body. ? Sudden vision changes. ? Sudden trouble speaking. ? Sudden confusion or trouble understanding simple statements. ? Sudden problems with walking or balance. ? A sudden, severe headache that is different from past headaches. · You have symptoms of a heart attack. These may include: 
? Chest pain or pressure, or a strange feeling in the chest. 
? Sweating. ? Shortness of breath. ? Nausea or vomiting. ? Pain, pressure, or a strange feeling in the back, neck, jaw, or upper belly or in one or both shoulders or arms. ? Lightheadedness or sudden weakness. ? A fast or irregular heartbeat. After you call 911, the  may tell you to chew 1 adult-strength or 2 to 4 low-dose aspirin. Wait for an ambulance. Do not try to drive yourself. Watch closely for changes in your health, and be sure to contact your doctor if: 
· Your lightheadedness gets worse or does not get better with home care. Where can you learn more? Go to http://www.gray.com/ Enter P662 in the search box to learn more about \"Lightheadedness or Faintness: Care Instructions. \" Current as of: June 26, 2019               Content Version: 12.5 © 0345-3449 [employer]. Care instructions adapted under license by [employer] (which disclaims liability or warranty for this information). If you have questions about a medical condition or this instruction, always ask your healthcare professional. [address] any warranty or liability for your use of this information.

## 2020-07-28 NOTE — PROGRESS NOTES
Lul Randhawa is a 50 y.o. female who presents to clinic today for the following:    Chief Complaint   Patient presents with   Tamara Recinosa     last Tuesday fell while getting out of the pool       Vitals:    07/28/20 1000   BP: 134/86   Pulse: 81   Resp: 16   Temp: 97.7 °F (36.5 °C)   TempSrc: Oral   SpO2: 97%   Weight: 180 lb (81.6 kg)   Height: 5' 4\" (1.626 m)       Body mass index is 30.9 kg/m². Patients past medical, surgical and family histories were reviewed. Allergies and Medications reviewed and updated. Current Outpatient Medications:     atenoloL (TENORMIN) 50 mg tablet, , Disp: , Rfl:     busPIRone (BUSPAR) 10 mg tablet, Take 1 Tab by mouth three (3) times daily. Indications: repeated episodes of anxiety, Disp: 90 Tab, Rfl: 5    diclofenac EC (VOLTAREN) 50 mg EC tablet, Take 1 Tab by mouth two (2) times a day., Disp: 30 Tab, Rfl: 0    traZODone (DESYREL) 100 mg tablet, Take 2 Tabs by mouth nightly as needed for Sleep., Disp: 60 Tab, Rfl: 5    amLODIPine (NORVASC) 5 mg tablet, Take 1 Tab by mouth daily. , Disp: 90 Tab, Rfl: 0    Blood Pressure Test Kit-Large ([employer] Talking Arm BP Monitr) kit, Check bp daily, Disp: 1 Kit, Rfl: 0    propranolol LA (INDERAL LA) 60 mg SR capsule, Take 1 Cap by mouth daily. , Disp: 30 Cap, Rfl: 2    omeprazole (PRILOSEC) 20 mg capsule, Take 20 mg by mouth daily as needed (if heartburn persists after first dose). , Disp: , Rfl:     cyclobenzaprine (FLEXERIL) 5 mg tablet, Take 10 mg by mouth nightly as needed for Muscle Spasm(s). , Disp: , Rfl:     albuterol (PROVENTIL HFA, VENTOLIN HFA, PROAIR HFA) 90 mcg/actuation inhaler, , Disp: , Rfl:     esomeprazole magnesium (NEXIUM PO), Take 20 mg by mouth two (2) times a day., Disp: , Rfl:     linaclotide (LINZESS PO), Take  by mouth., Disp: , Rfl:     Allergies   Allergen Reactions    Morphine Anaphylaxis     July 5, 2016 administered after surgery for pain.      Morphine Unknown (comments)    Other Medication Palpitations     Peripheral Block patient noted difficulty breathing and palpitations.        Past Medical History:   Diagnosis Date    Arrhythmia     PAC    Arthritis     Cellulitis     cellulitis    Chest pain     Diabetes ([facility] [address]     GERD (gastroesophageal reflux disease)     H/O seasonal allergies     Headache     Hearing loss 7/10/2020    Ill-defined condition     \"twisted\" kidney right    Lyme disease     Obesity     Plantar fasciitis     PUD (peptic ulcer disease)     Trigger finger, right index finger 2016    Vitamin D deficiency        Past Surgical History:   Procedure Laterality Date    HX GASTRIC BYPASS      HX GI      HX HEART CATHETERIZATION  7/28/15    HX ORTHOPAEDIC  7-30-15    Rt. rotator cuff repair     HX TONSILLECTOMY      DC COLSC FLX W/NDSC US XM RCTM ET AL LMTD&ADJ [facility]         Family History   Problem Relation Age of Onset    Cancer Paternal Aunt         colon polyps    Cancer Paternal Uncle         colon cancer    Stroke Mother     Heart Disease Mother     Elevated Lipids Mother     Cancer Father         prostate    Heart Disease Father         death by MI    Coronary Artery Disease Other         mother  64, father age 67 of MI    Other Brother         Passed while sleeping at 64, neck blockages and legs, hx of vascular surg    Hypertension Brother     Diabetes Brother     Anesth Problems Neg Hx        Social History     Socioeconomic History    Marital status:      Spouse name: Not on file    Number of children: Not on file    Years of education: Not on file    Highest education level: Not on file   Occupational History    Not on file   Social Needs    Financial resource strain: Not on file    Food insecurity     Worry: Not on file     Inability: Not on file    Transportation needs     Medical: Not on file     Non-medical: Not on file   Tobacco Use    Smoking status: Current Some Day Smoker     Packs/day: 0.50    Smokeless tobacco: Never Used   Substance and Sexual Activity    Alcohol use: Not Currently     Comment: rare    Drug use: Never    Sexual activity: Yes     Partners: Male   Lifestyle    Physical activity     Days per week: Not on file     Minutes per session: Not on file    Stress: Not on file   Relationships    Social connections     Talks on phone: Not on file     Gets together: Not on file     Attends Yazidi service: Not on file     Active member of club or organization: Not on file     Attends meetings of clubs or organizations: Not on file     Relationship status: Not on file    Intimate partner violence     Fear of current or ex partner: Not on file     Emotionally abused: Not on file     Physically abused: Not on file     Forced sexual activity: Not on file   Other Topics Concern    Not on file   Social History Narrative    ** Merged History Encounter **                Physical Exam  Vitals signs and nursing note reviewed. Constitutional:       Appearance: She is obese. HENT:      Head: Normocephalic and atraumatic. Nose: Nose normal.      Mouth/Throat:      Mouth: Mucous membranes are moist.   Eyes:      Pupils: Pupils are equal, round, and reactive to light. Neck:      Musculoskeletal: Normal range of motion and neck supple. Cardiovascular:      Rate and Rhythm: Normal rate and regular rhythm. Pulses: Normal pulses. Heart sounds: Normal heart sounds. Pulmonary:      Effort: Pulmonary effort is normal.   Abdominal:      General: Abdomen is flat. Musculoskeletal:         General: Signs of injury present. Skin:     Capillary Refill: Capillary refill takes less than 2 seconds. Findings: Bruising present. Neurological:      General: No focal deficit present. Mental Status: She is alert and oriented to person, place, and time. Mental status is at baseline.    Psychiatric:         Mood and Affect: Mood normal.         Behavior: Behavior normal.          Patient seen today in clinic for follow-up from a fall on the 20th at home. Patient fell out of a swimming pool and reportedly hit her head on the ground. The patient was taken to [facility] for evaluation. The patient was cleared to go home with only bruising noted as the injury. The patient has had recent history of syncopal episodes and is being followed by neurology for this. The patient was noted to have a low potassium while at the hospital.  I have ordered new labs today as requested by neurology. These will be reviewed and sent to the neurologist the patient also requested disability forms to be filled out. I was only able to fill out a small section that was applicable to what I have seen the patient for. The patient will need to see psychiatry and neurology to complete the remainder of the forms. Review of Systems   Constitutional: Negative. HENT: Negative. Eyes: Negative. Respiratory: Negative. Cardiovascular: Negative. Gastrointestinal: Negative. Genitourinary: Negative. Musculoskeletal: Positive for back pain. Skin:        bruising   Neurological: Negative. Endo/Heme/Allergies: Negative. Psychiatric/Behavioral: Negative. No results found. No results found for this or any previous visit (from the past 24 hour(s)). Assessment and Plan:    Encounter Diagnoses   Name Primary?  Syncope and collapse Yes                I have discussed the diagnosis with the patient and the intended plan as seen in the above orders. she has expressed understanding. The patient has received an after-visit summary and questions were answered concerning future plans. I have discussed medication side effects and warnings with the patient as well. Follow-up and Dispositions    · Return if symptoms worsen or fail to improve.          Electronically Signed: Mario Oakley NP

## 2020-07-30 NOTE — PROCEDURES
EEG:      Date:  07/22/20    Requesting Physician:  Mehdi Sy. MD Gric[place]    An EEG is requested in this 25-year-old with recurrent syncope to evaluate for epileptiform abnormality. Medications:  Medications are said to include Inderal, Flexeril, Keflex, BuSpar, Voltaren, Desyrel, Norvasc. This tracing is obtained during the awake state. During wakefulness there are intermittent runs of posteriorly-dominant and symmetric low-to-medium amplitude 9-10 cycle per second activities which attenuate with eye opening. Lower-voltage faster-frequency activities are seen symmetrically over the anterior head regions. Hyperventilation is not performed secondary to COVID-19 precautions. Intermittent photic stimulation little alters the tracing. Sleep is not attained. Interpretation: This EEG recorded during the awake state is normal.  No epileptiform abnormalities are seen; however, this does not exclude the possibility of seizures and/or epilepsy.   Should seizures remain a clinical concern, a repeat tracing following sleep deprivation may add additional sensitivity to the test.

## 2020-08-18 ENCOUNTER — VIRTUAL VISIT (OUTPATIENT)
Dept: FAMILY MEDICINE CLINIC | Age: 45
End: 2020-08-18
Payer: COMMERCIAL

## 2020-08-18 DIAGNOSIS — L08.9 SKIN INFECTION: Primary | ICD-10-CM

## 2020-08-18 PROCEDURE — 99213 OFFICE O/P EST LOW 20 MIN: CPT | Performed by: NURSE PRACTITIONER

## 2020-08-18 RX ORDER — CEPHALEXIN 500 MG/1
500 CAPSULE ORAL 2 TIMES DAILY
Qty: 14 CAP | Refills: 0 | Status: SHIPPED | OUTPATIENT
Start: 2020-08-18 | End: 2020-08-25

## 2020-08-18 NOTE — PROGRESS NOTES
Gloria Iqbal is a 50 y.o. female who was seen by synchronous (real-time) audio-video technology on 8/18/2020 for Rash (rash on left  arm)        Assessment & Plan:   Diagnoses and all orders for this visit:    1. Skin infection  -     cephALEXin (KEFLEX) 500 mg capsule; Take 1 Cap by mouth two (2) times a day for 7 days. Indications: skin infection due to Staphylococcus aureus bacteria      Orders Placed This Encounter    cephALEXin (KEFLEX) 500 mg capsule     Sig: Take 1 Cap by mouth two (2) times a day for 7 days. Indications: skin infection due to Staphylococcus aureus bacteria     Dispense:  14 Cap     Refill:  0       Patient was seen today by virtual visit. Patient several weeks ago suffered a syncopal episode and fell causing a large bruise to her left forearm. The hematoma has significantly healed but now has an area that is draining a clear/yellow discharge. The patient reports redness and itching to the skin around the area. It appears there may be a small infection. I have prescribed Keflex and advised the patient to wash with warm soap and water and then keep covered with bacitracin and Band-Aid. Of asked the patient to follow-up in 2 weeks if this has not healed. The patient will come in sooner if the area becomes larger, more red and warm with any streaking. I spent at least 15 minutes on this visit with this established patient. Subjective:       Prior to Admission medications    Medication Sig Start Date End Date Taking? Authorizing Provider   cephALEXin (KEFLEX) 500 mg capsule Take 1 Cap by mouth two (2) times a day for 7 days. Indications: skin infection due to Staphylococcus aureus bacteria 8/18/20 8/25/20 Yes Kemar Hwang NP   busPIRone (BUSPAR) 10 mg tablet Take 1 Tab by mouth three (3) times daily. Indications: repeated episodes of anxiety 7/8/20  Yes Kaya Crow MD   traZODone (DESYREL) 100 mg tablet Take 2 Tabs by mouth nightly as needed for Sleep. 7/8/20  Yes Alessandra Park MD   amLODIPine (NORVASC) 5 mg tablet Take 1 Tab by mouth daily. 7/8/20  Yes Alessandra Park MD   cyclobenzaprine (FLEXERIL) 5 mg tablet Take 10 mg by mouth nightly as needed for Muscle Spasm(s). Yes Provider, Historical   atenoloL (TENORMIN) 50 mg tablet  5/21/20   Provider, Historical   albuterol (PROVENTIL HFA, VENTOLIN HFA, PROAIR HFA) 90 mcg/actuation inhaler  3/22/20   Provider, Historical   diclofenac EC (VOLTAREN) 50 mg EC tablet Take 1 Tab by mouth two (2) times a day. 7/8/20   Alessandra Park MD   Blood Pressure Test Kit-Large ([employer] Talking Arm BP Monitr) kit Check bp daily 7/8/20   Alessandra Park MD   propranolol LA (INDERAL LA) 60 mg SR capsule Take 1 Cap by mouth daily. 7/7/20   Layla Valle NP   esomeprazole magnesium (NEXIUM PO) Take 20 mg by mouth two (2) times a day. Provider, Historical   linaclotide (LINZESS PO) Take  by mouth. Provider, Historical   omeprazole (PRILOSEC) 20 mg capsule Take 20 mg by mouth daily as needed (if heartburn persists after first dose).     Provider, Historical     Patient Active Problem List   Diagnosis Code    SOB (shortness of breath) R06.02    Chronic back pain M54.9, G89.29    Obesity E66.9    Lyme disease A69.20    Anxiety F41.9    Diverticular disease K57.90    GERD (gastroesophageal reflux disease) K21.9    Foot pain, bilateral M79.671, M79.672    Unspecified hereditary and idiopathic peripheral neuropathy G60.9    TMJ tenderness M26.629    Sinusitis J32.9    New daily persistent headache G44.52    Chest pain with normal coronary angiography R07.9    Morbid obesity with BMI of 45.0-49.9, adult ([facility]) E66.01, Z68.42    Elevated pulse rate R00.0    Cellulitis and abscess of neck L03.221, L02.11    Controlled type 2 diabetes mellitus without complication ([facility]) M76.6    Sepsis ([facility]) A41.9    Migraine without status migrainosus, not intractable G43.909    Anxiety F41.9    Gastroparesis K31.84    Other hyperlipidemia E78.49    Controlled type 2 diabetes mellitus without complication, without long-term current use of insulin (HCC) E11.9    Hearing loss H91.90     Patient Active Problem List    Diagnosis Date Noted    Hearing loss 07/10/2020    Migraine without status migrainosus, not intractable 05/20/2020    Anxiety 05/20/2020    Gastroparesis 05/20/2020    Other hyperlipidemia 05/20/2020    Controlled type 2 diabetes mellitus without complication, without long-term current use of insulin ([facility] [address] 05/20/2020    Cellulitis and abscess of neck 12/03/2016    Controlled type 2 diabetes mellitus without complication ([facility] [address] 27/23/4812    Sepsis [facility] 12/03/2016    Elevated pulse rate 08/31/2015    Chest pain with normal coronary angiography 07/28/2015    Morbid obesity with BMI of 45.0-49.9, adult ([facility] 07/28/2015    TMJ tenderness 06/01/2015    Sinusitis 06/01/2015    New daily persistent headache 06/01/2015    Foot pain, bilateral 04/08/2015    Unspecified hereditary and idiopathic peripheral neuropathy 04/08/2015    Chronic back pain 03/13/2014    Obesity 03/13/2014    Lyme disease 03/13/2014    Anxiety 03/13/2014    Diverticular disease 03/13/2014    GERD (gastroesophageal reflux disease) 03/13/2014    SOB (shortness of breath) 03/12/2014     Current Outpatient Medications   Medication Sig Dispense Refill    cephALEXin (KEFLEX) 500 mg capsule Take 1 Cap by mouth two (2) times a day for 7 days. Indications: skin infection due to Staphylococcus aureus bacteria 14 Cap 0    busPIRone (BUSPAR) 10 mg tablet Take 1 Tab by mouth three (3) times daily. Indications: repeated episodes of anxiety 90 Tab 5    traZODone (DESYREL) 100 mg tablet Take 2 Tabs by mouth nightly as needed for Sleep. 60 Tab 5    amLODIPine (NORVASC) 5 mg tablet Take 1 Tab by mouth daily. 90 Tab 0    cyclobenzaprine (FLEXERIL) 5 mg tablet Take 10 mg by mouth nightly as needed for Muscle Spasm(s).       atenoloL (TENORMIN) 50 mg tablet       albuterol (PROVENTIL HFA, VENTOLIN HFA, PROAIR HFA) 90 mcg/actuation inhaler       diclofenac EC (VOLTAREN) 50 mg EC tablet Take 1 Tab by mouth two (2) times a day. 30 Tab 0    Blood Pressure Test Kit-Large ([employer] Talking Arm BP Monitr) kit Check bp daily 1 Kit 0    propranolol LA (INDERAL LA) 60 mg SR capsule Take 1 Cap by mouth daily. 30 Cap 2    esomeprazole magnesium (NEXIUM PO) Take 20 mg by mouth two (2) times a day.  linaclotide (LINZESS PO) Take  by mouth.  omeprazole (PRILOSEC) 20 mg capsule Take 20 mg by mouth daily as needed (if heartburn persists after first dose). Allergies   Allergen Reactions    Morphine Anaphylaxis     July 5, 2016 administered after surgery for pain.  Morphine Unknown (comments)    Other Medication Palpitations     Peripheral Block patient noted difficulty breathing and palpitations. Past Medical History:   Diagnosis Date    Arrhythmia     PAC    Arthritis     Cellulitis     cellulitis    Chest pain     Diabetes ([address]     GERD (gastroesophageal reflux disease)     H/O seasonal allergies     Headache     Hearing loss 7/10/2020    Ill-defined condition     \"twisted\" kidney right    Lyme disease     Obesity     Plantar fasciitis     PUD (peptic ulcer disease)     Trigger finger, right index finger 2/2016    Vitamin D deficiency        Review of Systems   Constitutional: Negative. HENT: Negative. Eyes: Negative. Respiratory: Negative. Cardiovascular: Negative. Gastrointestinal: Negative. Genitourinary: Negative. Musculoskeletal: Negative. Skin: Positive for rash. Neurological: Negative. Endo/Heme/Allergies: Negative. Psychiatric/Behavioral: Negative.         Objective:     Patient-Reported Vitals 8/18/2020   Patient-Reported Weight 180lb   Patient-Reported Height 5ft 4in        [INSTRUCTIONS:  \"[x]\" Indicates a positive item  \"[]\" Indicates a negative item  -- DELETE ALL ITEMS NOT EXAMINED]    Constitutional: [x] Appears well-developed and well-nourished [x] No apparent distress      [] Abnormal -     Mental status: [x] Alert and awake  [x] Oriented to person/place/time [x] Able to follow commands    [] Abnormal -     Eyes:   EOM    [x]  Normal    [] Abnormal -   Sclera  [x]  Normal    [] Abnormal -          Discharge [x]  None visible   [] Abnormal -     HENT: [x] Normocephalic, atraumatic  [] Abnormal -   [x] Mouth/Throat: Mucous membranes are moist    External Ears [x] Normal  [] Abnormal -    Neck: [x] No visualized mass [] Abnormal -     Pulmonary/Chest: [x] Respiratory effort normal   [x] No visualized signs of difficulty breathing or respiratory distress        [] Abnormal -      Musculoskeletal:   [x] Normal gait with no signs of ataxia         [x] Normal range of motion of neck        [] Abnormal -     Neurological:        [x] No Facial Asymmetry (Cranial nerve 7 motor function) (limited exam due to video visit)          [x] No gaze palsy        [] Abnormal -          Skin:        [x] No significant exanthematous lesions or discoloration noted on facial skin         [] Abnormal -            Psychiatric:       [x] Normal Affect [] Abnormal -        [x] No Hallucinations    Other pertinent observable physical exam findings:-        We discussed the expected course, resolution and complications of the diagnosis(es) in detail. Medication risks, benefits, costs, interactions, and alternatives were discussed as indicated. I advised her to contact the office if her condition worsens, changes or fails to improve as anticipated. She expressed understanding with the diagnosis(es) and plan. Lamine Joseph, who was evaluated through a patient-initiated, synchronous (real-time) audio-video encounter, and/or her healthcare decision maker, is aware that it is a billable service, with coverage as determined by her insurance carrier.  She provided verbal consent to proceed: Yes, and patient identification was verified. It was conducted pursuant to the emergency declaration under the [facility], [address] [place] [address] and the [employer] General Act. A caregiver was present when appropriate. Ability to conduct physical exam was limited. I was in the office. The patient was at home.       Nonnie Satish, NP

## 2020-08-18 NOTE — PROGRESS NOTES
Identified pt with two pt identifiers(name and ). Reviewed record in preparation for visit and have obtained necessary documentation. Chief Complaint   Patient presents with    Rash     rash on left  arm        Health Maintenance Due   Topic    Pneumococcal 0-64 years (1 of 1 - PPSV23)    Foot Exam Q1     Eye Exam Retinal or Dilated     DTaP/Tdap/Td series (1 - Tdap)    Influenza Age 5 to Adult        There were no vitals taken for this visit. Coordination of Care Questionnaire:  :   1) Have you been to an emergency room, urgent care, or hospitalized since your last visit? NO      2. Have seen or consulted any other health care provider since your last visit? NO          Patient is accompanied by  I have received verbal consent from Nai Reyes to discuss any/all medical information while they are present in the room.

## 2020-08-19 ENCOUNTER — TELEPHONE (OUTPATIENT)
Dept: FAMILY MEDICINE CLINIC | Age: 45
End: 2020-08-19

## 2020-08-19 RX ORDER — ATENOLOL 50 MG/1
TABLET ORAL
Qty: 90 TAB | Refills: 1 | OUTPATIENT
Start: 2020-08-19

## 2020-08-19 NOTE — TELEPHONE ENCOUNTER
I spoke to Ms. Romy Prakash.   I will get one of the girls to call her and schedule an appt to be seen or VV per Dr. Radha Wilson to discuss her medications

## 2020-08-19 NOTE — TELEPHONE ENCOUNTER
Markell Doyle,  Can you call Ms. Boyer Heart to schedule either a VV or to come in to see Dr. Chad Haas for medication f/u, please.   [phone]  Thx,   aneta

## 2020-08-19 NOTE — TELEPHONE ENCOUNTER
Please omari & inform, \" Patient started on propranolol by neurology for migraines, she was previously on atenolol 50. She cannot take 2 beta blockers at same time. She has to choose either propranolol or atenolol. Please schedule visit with me so I can discuss & change medication.  It would be best if she comes in to the office so we can check vitals otherwise vv. thanks

## 2020-08-24 ENCOUNTER — PATIENT MESSAGE (OUTPATIENT)
Dept: NEUROLOGY | Age: 45
End: 2020-08-24

## 2020-08-26 ENCOUNTER — TELEPHONE (OUTPATIENT)
Dept: FAMILY MEDICINE CLINIC | Age: 45
End: 2020-08-26

## 2020-08-26 NOTE — TELEPHONE ENCOUNTER
----- Message from Delores Sanders sent at 8/26/2020 10:45 AM EDT -----  Regarding: Dr. Mary Jo Sandhu / telephone  Contact: [phone]  Caller's first and last name: n/a  Reason for call: Pt. was scheduled for an appt. 8/25/20 at 9:20 AM. Pt. never received link or phone call regarding the appt. Pt. is rescheduled for 8/27/20 at 8 AM. Pt. also stated that anxiety medication is not working.    Callback required yes/no and why: yes  Best contact number(s): [phone]  Details to clarify the request: n/a

## 2020-08-27 ENCOUNTER — VIRTUAL VISIT (OUTPATIENT)
Dept: FAMILY MEDICINE CLINIC | Age: 45
End: 2020-08-27
Payer: COMMERCIAL

## 2020-08-27 DIAGNOSIS — F41.9 ANXIETY: ICD-10-CM

## 2020-08-27 DIAGNOSIS — G43.009 MIGRAINE WITHOUT AURA AND WITHOUT STATUS MIGRAINOSUS, NOT INTRACTABLE: Primary | ICD-10-CM

## 2020-08-27 PROCEDURE — 99214 OFFICE O/P EST MOD 30 MIN: CPT | Performed by: FAMILY MEDICINE

## 2020-08-27 RX ORDER — TOPIRAMATE 50 MG/1
50 TABLET, FILM COATED ORAL 2 TIMES DAILY
Qty: 60 TAB | Refills: 5 | Status: SHIPPED | OUTPATIENT
Start: 2020-08-27 | End: 2020-11-12

## 2020-08-27 RX ORDER — SERTRALINE HYDROCHLORIDE 50 MG/1
50 TABLET, FILM COATED ORAL DAILY
Qty: 30 TAB | Refills: 3 | Status: SHIPPED | OUTPATIENT
Start: 2020-08-27 | End: 2020-11-23

## 2020-08-27 NOTE — PROGRESS NOTES
Nai Reyes is a 50 y.o. female evaluated via telephone on 8/27/2020. Consent:  She and/or health care decision maker is aware that that she may receive a bill for this telephone service, depending on her insurance coverage, and has provided verbal consent to proceed: Yes      Documentation:  I communicated with the patient and/or health care decision maker about see below. Details of this discussion including any medical advice provided:      Assessment & Plan:   Diagnoses and all orders for this visit:    1. Migraine without aura and without status migrainosus, not intractable  -     topiramate (TOPAMAX) 50 mg tablet; Take 1 Tab by mouth two (2) times a day. Indications: migraine prevention    2. Anxiety  -     sertraline (ZOLOFT) 50 mg tablet; Take 1 Tab by mouth daily. Indications: repeated episodes of anxiety    Start propanolol, continue atenolol and amlodipine. Start Topamax, discussed with neurology. Start Zoloft in addition to [address], if no improvement I will refer to psychiatry. Follow-up and Dispositions    · Return in about 1 month (around 9/27/2020) for follow up. I ADVISED PATIENT TO GO TO ER IF SYMPTOMS WORSEN , CHANGE OR FAILS TO IMPROVE. Lamar [address] Italia Hernandez is a 50 y.o. female who was seen for Medication Refill (Betablockers, Buspar not working)      1. Migraine without aura and without status migrainosus, not intractable  Patient was started on propranolol by neurology nurse practitioner. Patient is currently taking atenolol 50 mg in addition to propranolol. I advised patient that she did not take 2 beta-blockers at the same time. Patient agrees to switch propranolol for her migraines to Topamax. We will give 1 month trial and see if it controls her migraines. She has follow-up appointment with neurology i on September 1. Patient is currently taking amlodipine as well for blood pressure. She does not check her blood pressure at home.     2. Anxiety  Patient reports she continues to have anxiety symptoms. She is currently taking BuSpar 10 mg 3 times daily. She gets panic attacks if she is in crowded spaces. Recent panic attack was at [address]. She used to take lorazepam but agrees that this is a controlled substance and will have to see a psychiatrist for this. She reports other than the cramps this was her anxiety symptoms were stable on BuSpar. She agrees to start Zoloft in addition to BuSpar at this time. Prior to Admission medications    Medication Sig Start Date End Date Taking? Authorizing Provider   topiramate (TOPAMAX) 50 mg tablet Take 1 Tab by mouth two (2) times a day. Indications: migraine prevention 8/27/20  Yes Emeli Carlson MD   sertraline (ZOLOFT) 50 mg tablet Take 1 Tab by mouth daily. Indications: repeated episodes of anxiety 8/27/20  Yes Emeli Carlson MD   atenoloL (TENORMIN) 50 mg tablet  5/21/20  Yes Provider, Historical   busPIRone (BUSPAR) 10 mg tablet Take 1 Tab by mouth three (3) times daily. Indications: repeated episodes of anxiety 7/8/20  Yes Emeli Carlson MD   diclofenac EC (VOLTAREN) 50 mg EC tablet Take 1 Tab by mouth two (2) times a day. 7/8/20  Yes Emeli Carlson MD   traZODone (DESYREL) 100 mg tablet Take 2 Tabs by mouth nightly as needed for Sleep. 7/8/20  Yes Emeli Carlson MD   amLODIPine (NORVASC) 5 mg tablet Take 1 Tab by mouth daily. 7/8/20  Yes Emeli Carlson MD   linaclotide (LINZESS PO) Take  by mouth. Yes Provider, Historical   omeprazole (PRILOSEC) 20 mg capsule Take 20 mg by mouth daily as needed (if heartburn persists after first dose). Yes Provider, Historical   cyclobenzaprine (FLEXERIL) 5 mg tablet Take 10 mg by mouth nightly as needed for Muscle Spasm(s).    Yes Provider, Historical   albuterol (PROVENTIL HFA, VENTOLIN HFA, PROAIR HFA) 90 mcg/actuation inhaler  3/22/20   Provider, Historical   Blood Pressure Test Kit-Large ([employer] Talking Arm BP Monitr) kit Check bp daily 7/8/20   Emeli Carlson MD propranolol LA (INDERAL LA) 60 mg SR capsule Take 1 Cap by mouth daily. 7/7/20 8/27/20  Arron Danr, STACIE   esomeprazole magnesium (NEXIUM PO) Take 20 mg by mouth two (2) times a day. Provider, Historical     Allergies   Allergen Reactions    Morphine Anaphylaxis     July 5, 2016 administered after surgery for pain.  Morphine Unknown (comments)    Other Medication Palpitations     Peripheral Block patient noted difficulty breathing and palpitations.        Patient Active Problem List   Diagnosis Code    SOB (shortness of breath) R06.02    Chronic back pain M54.9, G89.29    Obesity E66.9    Lyme disease A69.20    Anxiety F41.9    Diverticular disease K57.90    GERD (gastroesophageal reflux disease) K21.9    Foot pain, bilateral M79.671, M79.672    Unspecified hereditary and idiopathic peripheral neuropathy G60.9    TMJ tenderness M26.629    Sinusitis J32.9    New daily persistent headache G44.52    Chest pain with normal coronary angiography R07.9    Morbid obesity with BMI of 45.0-49.9, adult ([facility]) E66.01, Z68.42    Elevated pulse rate R00.0    Cellulitis and abscess of neck L03.221, L02.11    Controlled type 2 diabetes mellitus without complication ([facility]) X64.1    Sepsis ([facility]) A41.9    Migraine without status migrainosus, not intractable G43.909    Anxiety F41.9    Gastroparesis K31.84    Other hyperlipidemia E78.49    Controlled type 2 diabetes mellitus without complication, without long-term current use of insulin ([facility]) E11.9    Hearing loss H91.90     Patient Active Problem List    Diagnosis Date Noted    Hearing loss 07/10/2020    Migraine without status migrainosus, not intractable 05/20/2020    Anxiety 05/20/2020    Gastroparesis 05/20/2020    Other hyperlipidemia 05/20/2020    Controlled type 2 diabetes mellitus without complication, without long-term current use of insulin ([facility] 05/20/2020    Cellulitis and abscess of neck 12/03/2016    Controlled type 2 diabetes mellitus without complication ([facility] 09/92/6050    Sepsis ([facility] 12/03/2016    Elevated pulse rate 08/31/2015    Chest pain with normal coronary angiography 07/28/2015    Morbid obesity with BMI of 45.0-49.9, adult ([facility] 07/28/2015    TMJ tenderness 06/01/2015    Sinusitis 06/01/2015    New daily persistent headache 06/01/2015    Foot pain, bilateral 04/08/2015    Unspecified hereditary and idiopathic peripheral neuropathy 04/08/2015    Chronic back pain 03/13/2014    Obesity 03/13/2014    Lyme disease 03/13/2014    Anxiety 03/13/2014    Diverticular disease 03/13/2014    GERD (gastroesophageal reflux disease) 03/13/2014    SOB (shortness of breath) 03/12/2014     Current Outpatient Medications   Medication Sig Dispense Refill    topiramate (TOPAMAX) 50 mg tablet Take 1 Tab by mouth two (2) times a day. Indications: migraine prevention 60 Tab 5    sertraline (ZOLOFT) 50 mg tablet Take 1 Tab by mouth daily. Indications: repeated episodes of anxiety 30 Tab 3    atenoloL (TENORMIN) 50 mg tablet       busPIRone (BUSPAR) 10 mg tablet Take 1 Tab by mouth three (3) times daily. Indications: repeated episodes of anxiety 90 Tab 5    diclofenac EC (VOLTAREN) 50 mg EC tablet Take 1 Tab by mouth two (2) times a day. 30 Tab 0    traZODone (DESYREL) 100 mg tablet Take 2 Tabs by mouth nightly as needed for Sleep. 60 Tab 5    amLODIPine (NORVASC) 5 mg tablet Take 1 Tab by mouth daily. 90 Tab 0    linaclotide (LINZESS PO) Take  by mouth.  omeprazole (PRILOSEC) 20 mg capsule Take 20 mg by mouth daily as needed (if heartburn persists after first dose).  cyclobenzaprine (FLEXERIL) 5 mg tablet Take 10 mg by mouth nightly as needed for Muscle Spasm(s).       albuterol (PROVENTIL HFA, VENTOLIN HFA, PROAIR HFA) 90 mcg/actuation inhaler       Blood Pressure Test Kit-Large ([employer] Talking Arm BP Monitr) kit Check bp daily 1 Kit 0    esomeprazole magnesium (NEXIUM PO) Take 20 mg by mouth two (2) times a day.       Allergies   Allergen Reactions    Morphine Anaphylaxis     2016 administered after surgery for pain.  Morphine Unknown (comments)    Other Medication Palpitations     Peripheral Block patient noted difficulty breathing and palpitations. Past Medical History:   Diagnosis Date    Arrhythmia     PAC    Arthritis     Cellulitis     cellulitis    Chest pain     Diabetes ([facility]     GERD (gastroesophageal reflux disease)     H/O seasonal allergies     Headache     Hearing loss 7/10/2020    Ill-defined condition     \"twisted\" kidney right    Lyme disease     Obesity     Plantar fasciitis     PUD (peptic ulcer disease)     Trigger finger, right index finger 2016    Vitamin D deficiency      Past Surgical History:   Procedure Laterality Date    HX GASTRIC BYPASS      HX GI      HX HEART CATHETERIZATION  7/28/15    HX ORTHOPAEDIC  7-30-15    Rt. rotator cuff repair     HX TONSILLECTOMY      CT COLSC FLX W/NDSC US XM RCTM ET AL LMTD&ADJ [facility]       Family History   Problem Relation Age of Onset    Cancer Paternal Aunt         colon polyps    Cancer Paternal Uncle         colon cancer    Stroke Mother     Heart Disease Mother     Elevated Lipids Mother     Cancer Father         prostate    Heart Disease Father         death by MI    Coronary Artery Disease Other         mother  64, father age 67 of MI    Other Brother         Passed while sleeping at 64, neck blockages and legs, hx of vascular surg    Hypertension Brother     Diabetes Brother     Anesth Problems Neg Hx      Social History     Tobacco Use    Smoking status: Current Some Day Smoker     Packs/day: 0.50    Smokeless tobacco: Never Used   Substance Use Topics    Alcohol use: Not Currently     Comment: rare       Review of Systems   Constitutional: Negative. HENT: Negative. Eyes: Negative. Respiratory: Negative. Cardiovascular: Negative. Gastrointestinal: Negative. Genitourinary: Negative. Musculoskeletal: Negative. Skin: Negative. Neurological: Negative. Endo/Heme/Allergies: Negative. Psychiatric/Behavioral: The patient is nervous/anxious. Patient was located at her home. I was at office while conducting this encounter. I affirm this is a Patient Initiated Episode with an Established Patient who has not had a related appointment within my department in the past 7 days or scheduled within the next 24 hours.     Total Time: minutes: 21-30 minutes    Note: not billable if this call serves to triage the patient into an appointment for the relevant concern      Tong Solis MD

## 2020-08-27 NOTE — PROGRESS NOTES
Patient stated name &   Chief Complaint   Patient presents with    Medication Refill     Betablockers, Buspar not working        Health Maintenance Due   Topic    Pneumococcal 0-64 years (1 of 1 - PPSV23)    Foot Exam Q1     Eye Exam Retinal or Dilated     DTaP/Tdap/Td series (1 - Tdap)       Wt Readings from Last 3 Encounters:   20 180 lb (81.6 kg)   20 180 lb (81.6 kg)   20 181 lb (82.1 kg)     Temp Readings from Last 3 Encounters:   20 97.7 °F (36.5 °C) (Oral)   20 (!) 96.2 °F (35.7 °C)   20 98.6 °F (37 °C) (Oral)     BP Readings from Last 3 Encounters:   20 134/86   20 135/83   20 (!) 152/96     Pulse Readings from Last 3 Encounters:   20 81   20 79   20 91         Learning Assessment:  :     Learning Assessment 10/21/2016 3/12/2014   PRIMARY LEARNER Patient Patient   HIGHEST LEVEL OF EDUCATION - PRIMARY LEARNER  - GRADUATED HIGH SCHOOL OR GED   BARRIERS PRIMARY LEARNER - READING   CO-LEARNER CAREGIVER - Yes   CO-LEARNER NAME - [address]    NEED - No   LEARNER PREFERENCE PRIMARY DEMONSTRATION LISTENING   LEARNING SPECIAL TOPICS - SOB why   ANSWERED BY Patient patient   RELATIONSHIP SELF SELF       Depression Screening:  :     3 most recent PHQ Screens 2020   Little interest or pleasure in doing things Not at all   Feeling down, depressed, irritable, or hopeless Not at all   Total Score PHQ 2 0       Fall Risk Assessment:  :     No flowsheet data found. Abuse Screening:  :     No flowsheet data found. Coordination of Care Questionnaire:  :     1) Have you been to an emergency room, urgent care clinic since your last visit? No    Hospitalized since your last visit? No             2) Have you seen or consulted any other health care providers outside of [address] [place] [address] since your last visit?  No  (Include any pap smears or colon screenings in this section.)    Patient is accompanied by VV I have received verbal consent from Rosy Baldwin to discuss any/all medical information while they are present in the room.

## 2020-09-08 ENCOUNTER — VIRTUAL VISIT (OUTPATIENT)
Dept: FAMILY MEDICINE CLINIC | Age: 45
End: 2020-09-08
Payer: COMMERCIAL

## 2020-09-08 DIAGNOSIS — L08.9 SKIN INFECTION: Primary | ICD-10-CM

## 2020-09-08 PROCEDURE — 99213 OFFICE O/P EST LOW 20 MIN: CPT | Performed by: NURSE PRACTITIONER

## 2020-09-08 RX ORDER — DOXYCYCLINE 100 MG/1
100 TABLET ORAL 2 TIMES DAILY
Qty: 20 TAB | Refills: 0 | Status: SHIPPED | OUTPATIENT
Start: 2020-09-08 | End: 2020-09-14

## 2020-09-08 NOTE — PROGRESS NOTES
Identified pt with two pt identifiers(name and ). Reviewed record in preparation for visit and have obtained necessary documentation. Chief Complaint   Patient presents with    Follow-up     for left arm        Health Maintenance Due   Topic    Pneumococcal 0-64 years (1 of 1 - PPSV23)    Foot Exam Q1     Eye Exam Retinal or Dilated     DTaP/Tdap/Td series (1 - Tdap)    Flu Vaccine (1)       There were no vitals taken for this visit. Coordination of Care Questionnaire:  :   1) Have you been to an emergency room, urgent care, or hospitalized since your last visit?  no      2. Have seen or consulted any other health care provider since your last visit? no        Patient is accompanied by  I have received verbal consent from Turner Erwin to discuss any/all medical information while they are present in the room.

## 2020-09-08 NOTE — PROGRESS NOTES
Lalita Raya is a 50 y.o. female who was seen by synchronous (real-time) audio-video technology on 9/8/2020 for Follow-up (for left arm) and Skin Problem        Assessment & Plan:   Diagnoses and all orders for this visit:    1. Skin infection  -     doxycycline (ADOXA) 100 mg tablet; Take 1 Tab by mouth two (2) times a day for 10 days. Indications: an infection of the skin and the tissue below the skin      Pt was seen for follow up from left forearm hematoma from fall. Pt has completed one round of abx and wound is still red and draining. The hard mass has resolved per the pt. I advised to wash with soap and water, keep covered at night, air during the day if able. Doxy given and pt virgen lfollow up as needed. Denies any fever at this time. I spent at least 15 minutes on this visit with this established patient. Subjective:       Prior to Admission medications    Medication Sig Start Date End Date Taking? Authorizing Provider   doxycycline (ADOXA) 100 mg tablet Take 1 Tab by mouth two (2) times a day for 10 days. Indications: an infection of the skin and the tissue below the skin 9/8/20 9/18/20 Yes [facility], STACIE   topiramate (TOPAMAX) 50 mg tablet Take 1 Tab by mouth two (2) times a day. Indications: migraine prevention 8/27/20  Yes Adonis Sorensen MD   atenoloL (TENORMIN) 50 mg tablet  5/21/20  Yes Provider, Historical   albuterol (PROVENTIL HFA, VENTOLIN HFA, PROAIR HFA) 90 mcg/actuation inhaler  3/22/20  Yes Provider, Historical   busPIRone (BUSPAR) 10 mg tablet Take 1 Tab by mouth three (3) times daily. Indications: repeated episodes of anxiety 7/8/20  Yes Adonis Sorensen MD   diclofenac EC (VOLTAREN) 50 mg EC tablet Take 1 Tab by mouth two (2) times a day. 7/8/20  Yes Adonis Sorensen MD   traZODone (DESYREL) 100 mg tablet Take 2 Tabs by mouth nightly as needed for Sleep. 7/8/20  Yes Adonis Sorensen MD   amLODIPine (NORVASC) 5 mg tablet Take 1 Tab by mouth daily.  7/8/20  Yes Adonis Sorensen MD linaclotide (LINZESS PO) Take  by mouth. Yes Provider, Historical   cyclobenzaprine (FLEXERIL) 5 mg tablet Take 10 mg by mouth nightly as needed for Muscle Spasm(s). Yes Provider, Historical   sertraline (ZOLOFT) 50 mg tablet Take 1 Tab by mouth daily. Indications: repeated episodes of anxiety 8/27/20   Magalis Dinh MD   Blood Pressure Test Kit-Large ([employer] Talking Arm BP Monitr) kit Check bp daily 7/8/20   Magalis Dinh MD   esomeprazole magnesium (NEXIUM PO) Take 20 mg by mouth two (2) times a day. Provider, Historical   omeprazole (PRILOSEC) 20 mg capsule Take 20 mg by mouth daily as needed (if heartburn persists after first dose).     Provider, Historical     Patient Active Problem List   Diagnosis Code    SOB (shortness of breath) R06.02    Chronic back pain M54.9, G89.29    Obesity E66.9    Lyme disease A69.20    Anxiety F41.9    Diverticular disease K57.90    GERD (gastroesophageal reflux disease) K21.9    Foot pain, bilateral M79.671, M79.672    Unspecified hereditary and idiopathic peripheral neuropathy G60.9    TMJ tenderness M26.629    Sinusitis J32.9    New daily persistent headache G44.52    Chest pain with normal coronary angiography R07.9    Morbid obesity with BMI of 45.0-49.9, adult ([facility]) E66.01, Z68.42    Elevated pulse rate R00.0    Cellulitis and abscess of neck L03.221, L02.11    Controlled type 2 diabetes mellitus without complication ([facility]) T64.5    Sepsis ([facility]) A41.9    Migraine without status migrainosus, not intractable G43.909    Anxiety F41.9    Gastroparesis K31.84    Other hyperlipidemia E78.49    Controlled type 2 diabetes mellitus without complication, without long-term current use of insulin ([facility]) E11.9    Hearing loss H91.90     Patient Active Problem List    Diagnosis Date Noted    Hearing loss 07/10/2020    Migraine without status migrainosus, not intractable 05/20/2020    Anxiety 05/20/2020    Gastroparesis 05/20/2020    Other hyperlipidemia 05/20/2020    Controlled type 2 diabetes mellitus without complication, without long-term current use of insulin ([facility] 05/20/2020    Cellulitis and abscess of neck 12/03/2016    Controlled type 2 diabetes mellitus without complication ([facility] 35/71/3511    Sepsis ([facility] 12/03/2016    Elevated pulse rate 08/31/2015    Chest pain with normal coronary angiography 07/28/2015    Morbid obesity with BMI of 45.0-49.9, adult ([facility] 07/28/2015    TMJ tenderness 06/01/2015    Sinusitis 06/01/2015    New daily persistent headache 06/01/2015    Foot pain, bilateral 04/08/2015    Unspecified hereditary and idiopathic peripheral neuropathy 04/08/2015    Chronic back pain 03/13/2014    Obesity 03/13/2014    Lyme disease 03/13/2014    Anxiety 03/13/2014    Diverticular disease 03/13/2014    GERD (gastroesophageal reflux disease) 03/13/2014    SOB (shortness of breath) 03/12/2014     Current Outpatient Medications   Medication Sig Dispense Refill    doxycycline (ADOXA) 100 mg tablet Take 1 Tab by mouth two (2) times a day for 10 days. Indications: an infection of the skin and the tissue below the skin 20 Tab 0    topiramate (TOPAMAX) 50 mg tablet Take 1 Tab by mouth two (2) times a day. Indications: migraine prevention 60 Tab 5    atenoloL (TENORMIN) 50 mg tablet       albuterol (PROVENTIL HFA, VENTOLIN HFA, PROAIR HFA) 90 mcg/actuation inhaler       busPIRone (BUSPAR) 10 mg tablet Take 1 Tab by mouth three (3) times daily. Indications: repeated episodes of anxiety 90 Tab 5    diclofenac EC (VOLTAREN) 50 mg EC tablet Take 1 Tab by mouth two (2) times a day. 30 Tab 0    traZODone (DESYREL) 100 mg tablet Take 2 Tabs by mouth nightly as needed for Sleep. 60 Tab 5    amLODIPine (NORVASC) 5 mg tablet Take 1 Tab by mouth daily. 90 Tab 0    linaclotide (LINZESS PO) Take  by mouth.  cyclobenzaprine (FLEXERIL) 5 mg tablet Take 10 mg by mouth nightly as needed for Muscle Spasm(s).       sertraline (ZOLOFT) 50 mg tablet Take 1 Tab by mouth daily. Indications: repeated episodes of anxiety 30 Tab 3    Blood Pressure Test Kit-Large ([employer] Talking Arm BP Monitr) kit Check bp daily 1 Kit 0    esomeprazole magnesium (NEXIUM PO) Take 20 mg by mouth two (2) times a day.  omeprazole (PRILOSEC) 20 mg capsule Take 20 mg by mouth daily as needed (if heartburn persists after first dose). Allergies   Allergen Reactions    Morphine Anaphylaxis     July 5, 2016 administered after surgery for pain.  Morphine Unknown (comments)    Other Medication Palpitations     Peripheral Block patient noted difficulty breathing and palpitations. Past Medical History:   Diagnosis Date    Arrhythmia     PAC    Arthritis     Cellulitis     cellulitis    Chest pain     Diabetes ([address]     GERD (gastroesophageal reflux disease)     H/O seasonal allergies     Headache     Hearing loss 7/10/2020    Ill-defined condition     \"twisted\" kidney right    Lyme disease     Obesity     Plantar fasciitis     PUD (peptic ulcer disease)     Trigger finger, right index finger 2/2016    Vitamin D deficiency        Review of Systems   Constitutional: Negative. HENT: Negative. Eyes: Negative. Respiratory: Negative. Cardiovascular: Negative. Gastrointestinal: Negative. Genitourinary: Negative. Musculoskeletal: Negative. Skin: Positive for rash. Healing lesion from hematoma   Neurological: Negative. Psychiatric/Behavioral: Negative.         Objective:     Patient-Reported Vitals 9/8/2020   Patient-Reported Weight 180lb   Patient-Reported Height -        [INSTRUCTIONS:  \"[x]\" Indicates a positive item  \"[]\" Indicates a negative item  -- DELETE ALL ITEMS NOT EXAMINED]    Constitutional: [x] Appears well-developed and well-nourished [x] No apparent distress      [] Abnormal -     Mental status: [x] Alert and awake  [x] Oriented to person/place/time [x] Able to follow commands    [] Abnormal - Eyes:   EOM    [x]  Normal    [] Abnormal -   Sclera  [x]  Normal    [] Abnormal -          Discharge [x]  None visible   [] Abnormal -     HENT: [x] Normocephalic, atraumatic  [] Abnormal -   [x] Mouth/Throat: Mucous membranes are moist    External Ears [x] Normal  [] Abnormal -    Neck: [x] No visualized mass [] Abnormal -     Pulmonary/Chest: [x] Respiratory effort normal   [x] No visualized signs of difficulty breathing or respiratory distress        [] Abnormal -      Musculoskeletal:   [x] Normal gait with no signs of ataxia         [x] Normal range of motion of neck        [] Abnormal -     Neurological:        [x] No Facial Asymmetry (Cranial nerve 7 motor function) (limited exam due to video visit)          [x] No gaze palsy        [] Abnormal -          Skin:        [x] No significant exanthematous lesions or discoloration noted on facial skin         [] Abnormal -            Psychiatric:       [x] Normal Affect [] Abnormal -        [x] No Hallucinations    Other pertinent observable physical exam findings:-        We discussed the expected course, resolution and complications of the diagnosis(es) in detail. Medication risks, benefits, costs, interactions, and alternatives were discussed as indicated. I advised her to contact the office if her condition worsens, changes or fails to improve as anticipated. She expressed understanding with the diagnosis(es) and plan. Darin Johnson, who was evaluated through a patient-initiated, synchronous (real-time) audio-video encounter, and/or her healthcare decision maker, is aware that it is a billable service, with coverage as determined by her insurance carrier. She provided verbal consent to proceed: Yes, and patient identification was verified. It was conducted pursuant to the emergency declaration under the [facility], [address] [place] [address] authority and the [place] [employer] Act. A caregiver was present when appropriate. Ability to conduct physical exam was limited. I was in the office. The patient was at home.       Bouchra Patel NP

## 2020-09-14 ENCOUNTER — OFFICE VISIT (OUTPATIENT)
Dept: FAMILY MEDICINE CLINIC | Age: 45
End: 2020-09-14
Payer: COMMERCIAL

## 2020-09-14 VITALS
DIASTOLIC BLOOD PRESSURE: 106 MMHG | BODY MASS INDEX: 30.73 KG/M2 | HEIGHT: 64 IN | WEIGHT: 180 LBS | SYSTOLIC BLOOD PRESSURE: 144 MMHG | HEART RATE: 75 BPM

## 2020-09-14 DIAGNOSIS — I10 ESSENTIAL HYPERTENSION: ICD-10-CM

## 2020-09-14 DIAGNOSIS — L03.114 CELLULITIS OF LEFT UPPER EXTREMITY: Primary | ICD-10-CM

## 2020-09-14 DIAGNOSIS — T78.40XA ALLERGIC REACTION TO DRUG, INITIAL ENCOUNTER: ICD-10-CM

## 2020-09-14 PROCEDURE — 99214 OFFICE O/P EST MOD 30 MIN: CPT | Performed by: FAMILY MEDICINE

## 2020-09-14 RX ORDER — HYDROXYZINE 50 MG/1
50 TABLET, FILM COATED ORAL
Qty: 30 TAB | Refills: 0 | Status: SHIPPED | OUTPATIENT
Start: 2020-09-14 | End: 2020-09-24

## 2020-09-14 RX ORDER — AMLODIPINE BESYLATE 10 MG/1
10 TABLET ORAL DAILY
Qty: 90 TAB | Refills: 1 | Status: SHIPPED | OUTPATIENT
Start: 2020-09-14 | End: 2020-11-12

## 2020-09-14 RX ORDER — BACITRACIN ZINC 500 UNIT/G
OINTMENT (GRAM) TOPICAL 2 TIMES DAILY
Qty: 15 G | Refills: 0 | Status: SHIPPED | OUTPATIENT
Start: 2020-09-14 | End: 2020-09-23 | Stop reason: SDUPTHER

## 2020-09-14 RX ORDER — AMOXICILLIN AND CLAVULANATE POTASSIUM 875; 125 MG/1; MG/1
1 TABLET, FILM COATED ORAL EVERY 12 HOURS
Qty: 20 TAB | Refills: 0 | Status: SHIPPED | OUTPATIENT
Start: 2020-09-14 | End: 2020-09-24

## 2020-09-14 NOTE — PROGRESS NOTES
Identified pt with two pt identifiers(name and ). Reviewed record in preparation for visit and have obtained necessary documentation. Chief Complaint   Patient presents with    Hives    Medication Problem        Health Maintenance Due   Topic    Pneumococcal 0-64 years (1 of 1 - PPSV23)    Foot Exam Q1     Eye Exam Retinal or Dilated     DTaP/Tdap/Td series (1 - Tdap)    Flu Vaccine (1)       Visit Vitals  Blood Pressure (Abnormal) 144/106 (BP 1 Location: Left arm, BP Patient Position: Sitting)   Pulse 75   Height 5' 4\" (1.626 m)   Weight 180 lb (81.6 kg)   Body Mass Index 30.90 kg/m²         Coordination of Care Questionnaire:  :   1) Have you been to an emergency room, urgent care, or hospitalized since your last visit? NO      2. Have seen or consulted any other health care provider since your last visit? NO      Patient is accompanied by  I have received verbal consent from Med Rod[facility]guez to discuss any/all medical information while they are present in the room.

## 2020-09-14 NOTE — PROGRESS NOTES
Consent: Heidi Prasad, who was seen by synchronous (real-time) audio-video technology, and/or her healthcare decision maker, is aware that this patient-initiated, Telehealth encounter on 9/14/2020 is a billable service, with coverage as determined by her insurance carrier. She is aware that she may receive a bill and has provided verbal consent to proceed: Yes. Assessment & Plan:   Diagnoses and all orders for this visit:    1. Cellulitis of left upper extremity  -     bacitracin zinc (BACITRACIN) ointment; Apply  to affected area two (2) times a day. -     amoxicillin-clavulanate (AUGMENTIN) 875-125 mg per tablet; Take 1 Tab by mouth every twelve (12) hours for 10 days. 2. Essential hypertension  -     amLODIPine (NORVASC) 10 mg tablet; Take 1 Tab by mouth daily. 3. Allergic reaction to drug, initial encounter  -     hydrOXYzine HCL (ATARAX) 50 mg tablet; Take 1 Tab by mouth three (3) times daily as needed for Itching for up to 10 days. Follow-up and Dispositions    · Return in about 2 weeks (around 9/28/2020), or if symptoms worsen or fail to improve, for follow up. I ADVISED PATIENT TO GO TO ER IF SYMPTOMS WORSEN , CHANGE OR FAILS TO IMPROVE. I spent at least 15 minutes with this established patient, and >50% of the time was spent counseling and/or coordinating care regarding SEE BELOW  [phone]  Subjective:   Heidi Prasad is a 50 y.o. female who was seen for Hives and Medication Problem      1. Essential hypertension  The patient presents today for HTN follow-up. Taking medications daily w/o complications. Home BP readings range from 140s/100s. SIde effects of meds: None  Patient trying to follow low salt diet.     Lab Results   Component Value Date/Time    Sodium 142 07/28/2020 10:35 AM    Potassium 3.9 07/28/2020 10:35 AM    Chloride 106 07/28/2020 10:35 AM    CO2 28 07/28/2020 10:35 AM    Anion gap 8 07/28/2020 10:35 AM    Glucose 85 07/28/2020 10:35 AM    BUN 6 07/28/2020 10:35 AM    Creatinine 0.62 07/28/2020 10:35 AM    BUN/Creatinine ratio 10 (L) 07/28/2020 10:35 AM    GFR est AA >60 07/28/2020 10:35 AM    GFR est non-AA >60 07/28/2020 10:35 AM    Calcium 8.6 07/28/2020 10:35 AM     Lab Results   Component Value Date/Time    Microalbumin/Creat ratio (mg/g creat) 18 07/14/2020 10:30 AM    Microalbumin,urine random 3.87 07/14/2020 10:30 AM       2. Cellulitis of left upper extremity  Patient reports erythema and yellow pus discharge from left arm hematoma. Patient has been treated with Keflex twice without improvement. She was recently started on doxycycline and developed hives after taking 1 pill. She has been using wound care has helped. She denies chills or fever. A wound culture has not been done. I advised patient to stop doxycycline and start Augmentin. She has to come back to the office to get swab for culture. 3. Allergic reaction to drug, initial encounter  Patient reports allergic reaction after taking doxycycline. She developed hives all over her body including legs back chest.  She reports pruritus. She is not tried any OTC medications. She took another dose of doxycycline today. I advised her to stop taking doxycycline. I added doxycycline to her allergy list.       Prior to Admission medications    Medication Sig Start Date End Date Taking? Authorizing Provider   amLODIPine (NORVASC) 10 mg tablet Take 1 Tab by mouth daily. 9/14/20  Yes Alex Enriquez MD   hydrOXYzine HCL (ATARAX) 50 mg tablet Take 1 Tab by mouth three (3) times daily as needed for Itching for up to 10 days. 9/14/20 9/24/20 Yes Alex Enriquez MD   bacitracin zinc (BACITRACIN) ointment Apply  to affected area two (2) times a day. 9/14/20  Yes Alex Enriquez MD   amoxicillin-clavulanate (AUGMENTIN) 875-125 mg per tablet Take 1 Tab by mouth every twelve (12) hours for 10 days.  9/14/20 9/24/20 Yes Alex Enriquez MD   topiramate (TOPAMAX) 50 mg tablet Take 1 Tab by mouth two (2) times a day. Indications: migraine prevention 8/27/20  Yes Apolinar Martin MD   sertraline (ZOLOFT) 50 mg tablet Take 1 Tab by mouth daily. Indications: repeated episodes of anxiety 8/27/20  Yes Apolinar Martin MD   atenoloL (TENORMIN) 50 mg tablet  5/21/20  Yes Provider, Historical   albuterol (PROVENTIL HFA, VENTOLIN HFA, PROAIR HFA) 90 mcg/actuation inhaler  3/22/20  Yes Provider, Historical   busPIRone (BUSPAR) 10 mg tablet Take 1 Tab by mouth three (3) times daily. Indications: repeated episodes of anxiety 7/8/20  Yes Apolinar Martin MD   diclofenac EC (VOLTAREN) 50 mg EC tablet Take 1 Tab by mouth two (2) times a day. 7/8/20  Yes Apolinar Martin MD   traZODone (DESYREL) 100 mg tablet Take 2 Tabs by mouth nightly as needed for Sleep. 7/8/20  Yes Apolinar Martin MD   Blood Pressure Test Kit-Large ([employer] Talking Arm BP Monitr) kit Check bp daily 7/8/20  Yes Apolinar Martin MD   esomeprazole magnesium (NEXIUM PO) Take 20 mg by mouth two (2) times a day. Yes Provider, Historical   linaclotide (LINZESS PO) Take  by mouth. Yes Provider, Historical   omeprazole (PRILOSEC) 20 mg capsule Take 20 mg by mouth daily as needed (if heartburn persists after first dose). Yes Provider, Historical   cyclobenzaprine (FLEXERIL) 5 mg tablet Take 10 mg by mouth nightly as needed for Muscle Spasm(s). Yes Provider, Historical   doxycycline (ADOXA) 100 mg tablet Take 1 Tab by mouth two (2) times a day for 10 days. Indications: an infection of the skin and the tissue below the skin 9/8/20 9/14/20  Jean Claude Cortez NP   amLODIPine (NORVASC) 5 mg tablet Take 1 Tab by mouth daily. 7/8/20 9/14/20  Apolinar Martin MD     Allergies   Allergen Reactions    Morphine Anaphylaxis     July 5, 2016 administered after surgery for pain.  Doxycycline Hives    Morphine Unknown (comments)    Other Medication Palpitations     Peripheral Block patient noted difficulty breathing and palpitations.        Patient Active Problem List   Diagnosis Code  SOB (shortness of breath) R06.02    Chronic back pain M54.9, G89.29    Obesity E66.9    Lyme disease A69.20    Anxiety F41.9    Diverticular disease K57.90    GERD (gastroesophageal reflux disease) K21.9    Foot pain, bilateral M79.671, M79.672    Unspecified hereditary and idiopathic peripheral neuropathy G60.9    TMJ tenderness M26.629    Sinusitis J32.9    New daily persistent headache G44.52    Chest pain with normal coronary angiography R07.9    Morbid obesity with BMI of 45.0-49.9, adult ([facility]) E66.01, Z68.42    Elevated pulse rate R00.0    Cellulitis and abscess of neck L03.221, L02.11    Controlled type 2 diabetes mellitus without complication ([facility]) C41.8    Sepsis ([facility]) A41.9    Migraine without status migrainosus, not intractable G43.909    Anxiety F41.9    Gastroparesis K31.84    Other hyperlipidemia E78.49    Controlled type 2 diabetes mellitus without complication, without long-term current use of insulin ([facility]) E11.9    Hearing loss H91.90     Patient Active Problem List    Diagnosis Date Noted    Hearing loss 07/10/2020    Migraine without status migrainosus, not intractable 05/20/2020    Anxiety 05/20/2020    Gastroparesis 05/20/2020    Other hyperlipidemia 05/20/2020    Controlled type 2 diabetes mellitus without complication, without long-term current use of insulin ([facility] 05/20/2020    Cellulitis and abscess of neck 12/03/2016    Controlled type 2 diabetes mellitus without complication ([facility] 22/30/5496    Sepsis ([facility] 12/03/2016    Elevated pulse rate 08/31/2015    Chest pain with normal coronary angiography 07/28/2015    Morbid obesity with BMI of 45.0-49.9, adult ([facility] 07/28/2015    TMJ tenderness 06/01/2015    Sinusitis 06/01/2015    New daily persistent headache 06/01/2015    Foot pain, bilateral 04/08/2015    Unspecified hereditary and idiopathic peripheral neuropathy 04/08/2015    Chronic back pain 03/13/2014    Obesity 03/13/2014    Lyme disease 03/13/2014    Anxiety 03/13/2014    Diverticular disease 03/13/2014    GERD (gastroesophageal reflux disease) 03/13/2014    SOB (shortness of breath) 03/12/2014     Current Outpatient Medications   Medication Sig Dispense Refill    amLODIPine (NORVASC) 10 mg tablet Take 1 Tab by mouth daily. 90 Tab 1    hydrOXYzine HCL (ATARAX) 50 mg tablet Take 1 Tab by mouth three (3) times daily as needed for Itching for up to 10 days. 30 Tab 0    bacitracin zinc (BACITRACIN) ointment Apply  to affected area two (2) times a day. 15 g 0    amoxicillin-clavulanate (AUGMENTIN) 875-125 mg per tablet Take 1 Tab by mouth every twelve (12) hours for 10 days. 20 Tab 0    topiramate (TOPAMAX) 50 mg tablet Take 1 Tab by mouth two (2) times a day. Indications: migraine prevention 60 Tab 5    sertraline (ZOLOFT) 50 mg tablet Take 1 Tab by mouth daily. Indications: repeated episodes of anxiety 30 Tab 3    atenoloL (TENORMIN) 50 mg tablet       albuterol (PROVENTIL HFA, VENTOLIN HFA, PROAIR HFA) 90 mcg/actuation inhaler       busPIRone (BUSPAR) 10 mg tablet Take 1 Tab by mouth three (3) times daily. Indications: repeated episodes of anxiety 90 Tab 5    diclofenac EC (VOLTAREN) 50 mg EC tablet Take 1 Tab by mouth two (2) times a day. 30 Tab 0    traZODone (DESYREL) 100 mg tablet Take 2 Tabs by mouth nightly as needed for Sleep. 60 Tab 5    Blood Pressure Test Kit-Large ([employer] Talking Arm BP Monitr) kit Check bp daily 1 Kit 0    esomeprazole magnesium (NEXIUM PO) Take 20 mg by mouth two (2) times a day.  linaclotide (LINZESS PO) Take  by mouth.  omeprazole (PRILOSEC) 20 mg capsule Take 20 mg by mouth daily as needed (if heartburn persists after first dose).  cyclobenzaprine (FLEXERIL) 5 mg tablet Take 10 mg by mouth nightly as needed for Muscle Spasm(s). Allergies   Allergen Reactions    Morphine Anaphylaxis     July 5, 2016 administered after surgery for pain.      Doxycycline Hives    Morphine Unknown (comments)    Other Medication Palpitations     Peripheral Block patient noted difficulty breathing and palpitations. Past Medical History:   Diagnosis Date    Arrhythmia     PAC    Arthritis     Cellulitis     cellulitis    Chest pain     Diabetes ([address]     GERD (gastroesophageal reflux disease)     H/O seasonal allergies     Headache     Hearing loss 7/10/2020    Ill-defined condition     \"twisted\" kidney right    Lyme disease     Obesity     Plantar fasciitis     PUD (peptic ulcer disease)     Trigger finger, right index finger 2016    Vitamin D deficiency      Past Surgical History:   Procedure Laterality Date    HX GASTRIC BYPASS      HX GI      HX HEART CATHETERIZATION  7/28/15    HX ORTHOPAEDIC  7-30-15    Rt. rotator cuff repair     HX TONSILLECTOMY      UT COLSC FLX W/NDSC US XM RCTM ET AL LMTD&ADJ [facility]       Family History   Problem Relation Age of Onset    Cancer Paternal Aunt         colon polyps    Cancer Paternal Uncle         colon cancer    Stroke Mother     Heart Disease Mother     Elevated Lipids Mother     Cancer Father         prostate    Heart Disease Father         death by MI    Coronary Artery Disease Other         mother  64, father age 67 of MI    Other Brother         Passed while sleeping at 64, neck blockages and legs, hx of vascular surg    Hypertension Brother     Diabetes Brother     Anesth Problems Neg Hx      Social History     Tobacco Use    Smoking status: Current Some Day Smoker     Packs/day: 0.50    Smokeless tobacco: Never Used   Substance Use Topics    Alcohol use: Not Currently     Comment: rare       Review of Systems   Constitutional: Negative. HENT: Negative. Eyes: Negative. Respiratory: Negative. Cardiovascular: Negative. Gastrointestinal: Negative. Genitourinary: Negative. Musculoskeletal: Negative. Skin: Positive for rash. Healing lesion from hematoma   Neurological: Negative. Psychiatric/Behavioral: Negative. Objective:     Patient-Reported Vitals 9/8/2020   Patient-Reported Weight 180lb   Patient-Reported Height -        [INSTRUCTIONS:  \"[x]\" Indicates a positive item  \"[]\" Indicates a negative item  -- DELETE ALL ITEMS NOT EXAMINED]    Constitutional: [x] Appears well-developed and well-nourished [x] No apparent distress      [] Abnormal -     Mental status: [x] Alert and awake  [x] Oriented to person/place/time [x] Able to follow commands    [] Abnormal -     Eyes:   EOM    [x]  Normal    [] Abnormal -   Sclera  [x]  Normal    [] Abnormal -          Discharge [x]  None visible   [] Abnormal -     HENT: [x] Normocephalic, atraumatic  [] Abnormal -   [x] Mouth/Throat: Mucous membranes are moist    External Ears [x] Normal  [] Abnormal -    Neck: [x] No visualized mass [] Abnormal -     Pulmonary/Chest: [x] Respiratory effort normal   [x] No visualized signs of difficulty breathing or respiratory distress        [] Abnormal -      Musculoskeletal:   [x] Normal gait with no signs of ataxia         [x] Normal range of motion of neck        [] Abnormal -     Neurological:        [x] No Facial Asymmetry (Cranial nerve 7 motor function) (limited exam due to video visit)          [x] No gaze palsy        [] Abnormal -          Skin:        [] No significant exanthematous lesions or discoloration noted on facial skin         [x] Abnormal -hives noted on the back. Also visualized left arm cellulitis at the location of previous hematoma. Erythematous and oozing pus           Psychiatric:       [x] Normal Affect [] Abnormal -        [x] No Hallucinations    Other pertinent observable physical exam findings:-    We discussed the expected course, resolution and complications of the diagnosis(es) in detail. Medication risks, benefits, costs, interactions, and alternatives were discussed as indicated.   I advised her to contact the office if her condition worsens, changes or fails to improve as anticipated. She expressed understanding with the diagnosis(es) and plan. Lul Randhawa is a 50 y.o. female being evaluated by a video visit encounter for concerns as above. A caregiver was present when appropriate. Due to this being a TeleHealth encounter (During CQETF-00 public health emergency), evaluation of the following organ systems was limited: Vitals/Constitutional/EENT/Resp/CV/GI//MS/Neuro/Skin/Heme-Lymph-Imm. Pursuant to the emergency declaration under the [facility], [facility] waiver authority and the [employer] Act, this Virtual  Visit was conducted, with patient's (and/or legal guardian's) consent, to reduce the patient's risk of exposure to COVID-19 and provide necessary medical care. Services were provided through a video synchronous discussion virtually to substitute for in-person clinic visit. Patient was located at her home. I was at office while conducting this encounter.        Leighann Robins MD

## 2020-09-23 DIAGNOSIS — L03.114 CELLULITIS OF LEFT UPPER EXTREMITY: ICD-10-CM

## 2020-09-23 RX ORDER — BACITRACIN ZINC 500 UNIT/G
OINTMENT (GRAM) TOPICAL 2 TIMES DAILY
Qty: 15 G | Refills: 0 | Status: SHIPPED | OUTPATIENT
Start: 2020-09-23 | End: 2020-10-08

## 2020-09-28 ENCOUNTER — OFFICE VISIT (OUTPATIENT)
Dept: FAMILY MEDICINE CLINIC | Age: 45
End: 2020-09-28
Payer: COMMERCIAL

## 2020-09-28 VITALS
OXYGEN SATURATION: 97 % | RESPIRATION RATE: 16 BRPM | DIASTOLIC BLOOD PRESSURE: 75 MMHG | HEART RATE: 107 BPM | WEIGHT: 182 LBS | BODY MASS INDEX: 31.07 KG/M2 | HEIGHT: 64 IN | TEMPERATURE: 97.9 F | SYSTOLIC BLOOD PRESSURE: 128 MMHG

## 2020-09-28 DIAGNOSIS — L03.114 CELLULITIS OF LEFT UPPER EXTREMITY: Primary | ICD-10-CM

## 2020-09-28 PROCEDURE — 99213 OFFICE O/P EST LOW 20 MIN: CPT | Performed by: FAMILY MEDICINE

## 2020-09-28 RX ORDER — CLOTRIMAZOLE AND BETAMETHASONE DIPROPIONATE 10; .64 MG/G; MG/G
CREAM TOPICAL 2 TIMES DAILY
Qty: 15 G | Refills: 2 | Status: SHIPPED | OUTPATIENT
Start: 2020-09-28 | End: 2020-10-15 | Stop reason: SDUPTHER

## 2020-09-28 NOTE — PROGRESS NOTES
Walter Dean is a 50 y.o. female who presents today with the following:    HPI  Chief Complaint   Patient presents with   Kavya Boone Fall    Bleeding/Bruising     Patient reports left arm brusing due to fall injury. Patient states not healing properly. 1. Cellulitis of left upper extremity  Patient is here for follow-up of her cellulitis of left forearm. She was recently treated with antibiotics. She has completed her current antibiotic course. Her infection has improved and there is no more drainage from the rash. She noticed erythematous rash developing around the previous cellulitis. This started 1 week ago. She also has pruritus. She recently started bacitracin ointment 1 week ago. We discussed stopping bacitracin. We will not start any more antibiotics as rash is healing well. I will send out culture swab. Review of Systems   Constitutional: Negative. HENT: Negative. Eyes: Negative. Respiratory: Negative. Cardiovascular: Negative. Gastrointestinal: Negative. Genitourinary: Negative. Musculoskeletal: Negative. Skin: Positive for rash. Healing lesion from hematoma   Neurological: Negative. Psychiatric/Behavioral: Negative. Physical Exam  Vitals signs and nursing note reviewed. HENT:      Head: Normocephalic and atraumatic. Right Ear: External ear normal.      Left Ear: External ear normal.   Eyes:      Extraocular Movements: Extraocular movements intact. Conjunctiva/sclera: Conjunctivae normal.      Pupils: Pupils are equal, round, and reactive to light. Neck:      Musculoskeletal: Normal range of motion. Thyroid: No thyromegaly. Cardiovascular:      Rate and Rhythm: Normal rate and regular rhythm. Pulmonary:      Effort: Pulmonary effort is normal.      Breath sounds: Normal breath sounds. Musculoskeletal: Normal range of motion. Skin:     General: Skin is warm and dry. Findings: Bruising and rash present.       Comments: Erythematous rash noted on left forearm. There is no drainage. I see some bruising   Neurological:      Mental Status: She is alert and oriented to person, place, and time. Psychiatric:         Mood and Affect: Mood and affect normal.       /75 (BP 1 Location: Right arm, BP Patient Position: Sitting)   Pulse (!) 107   Temp 97.9 °F (36.6 °C) (Oral)   Resp 16   Ht 5' 4\" (1.626 m)   Wt 182 lb (82.6 kg)   SpO2 97%   BMI 31.24 kg/m²     Allergies   Allergen Reactions    Morphine Anaphylaxis     July 5, 2016 administered after surgery for pain.  Doxycycline Hives    Morphine Unknown (comments)    Other Medication Palpitations     Peripheral Block patient noted difficulty breathing and palpitations. Current Outpatient Medications   Medication Sig    clotrimazole-betamethasone (LOTRISONE) topical cream Apply  to affected area two (2) times a day.  bacitracin zinc (BACITRACIN) ointment Apply  to affected area two (2) times a day.  amLODIPine (NORVASC) 10 mg tablet Take 1 Tab by mouth daily.  topiramate (TOPAMAX) 50 mg tablet Take 1 Tab by mouth two (2) times a day. Indications: migraine prevention    sertraline (ZOLOFT) 50 mg tablet Take 1 Tab by mouth daily. Indications: repeated episodes of anxiety    atenoloL (TENORMIN) 50 mg tablet     busPIRone (BUSPAR) 10 mg tablet Take 1 Tab by mouth three (3) times daily. Indications: repeated episodes of anxiety    diclofenac EC (VOLTAREN) 50 mg EC tablet Take 1 Tab by mouth two (2) times a day.  traZODone (DESYREL) 100 mg tablet Take 2 Tabs by mouth nightly as needed for Sleep.  linaclotide (LINZESS PO) Take  by mouth.  omeprazole (PRILOSEC) 20 mg capsule Take 20 mg by mouth daily as needed (if heartburn persists after first dose).  cyclobenzaprine (FLEXERIL) 5 mg tablet Take 10 mg by mouth nightly as needed for Muscle Spasm(s).     albuterol (PROVENTIL HFA, VENTOLIN HFA, PROAIR HFA) 90 mcg/actuation inhaler     Blood Pressure Test Kit-Large ([employer] Talking Arm BP Monitr) kit Check bp daily    esomeprazole magnesium (NEXIUM PO) Take 20 mg by mouth two (2) times a day. No current facility-administered medications for this visit.         Past Medical History:   Diagnosis Date    Arrhythmia     PAC    Arthritis     Cellulitis     cellulitis    Chest pain     Diabetes ([facility]     GERD (gastroesophageal reflux disease)     H/O seasonal allergies     Headache     Hearing loss 7/10/2020    Ill-defined condition     \"twisted\" kidney right    Lyme disease     Obesity     Plantar fasciitis     PUD (peptic ulcer disease)     Trigger finger, right index finger 2/2016    Vitamin D deficiency        Past Surgical History:   Procedure Laterality Date    HX GASTRIC BYPASS  2015    HX GI      HX HEART CATHETERIZATION  7/28/15    HX ORTHOPAEDIC  7-30-15    Rt. rotator cuff repair     HX TONSILLECTOMY      [place] COLSC FLX W/NDSC US XM RCTM ET AL LMTD&ADJ STRUX  2011       Problem List  Date Reviewed: 9/28/2020          Codes Class Noted    Hearing loss ICD-10-CM: H91.90  ICD-9-CM: 389.9  7/10/2020        Migraine without status migrainosus, not intractable ICD-10-CM: G43.909  ICD-9-CM: 346.90  5/20/2020        Anxiety ICD-10-CM: F41.9  ICD-9-CM: 300.00  5/20/2020        Gastroparesis ICD-10-CM: K31.84  ICD-9-CM: 536.3  5/20/2020        Other hyperlipidemia ICD-10-CM: E78.49  ICD-9-CM: 272.4  5/20/2020        Controlled type 2 diabetes mellitus without complication, without long-term current use of insulin ([facility] ICD-10-CM: E11.9  ICD-9-CM: 250.00  5/20/2020        Cellulitis and abscess of neck ICD-10-CM: L03.221, L02.11  ICD-9-CM: 682.1  12/3/2016        Controlled type 2 diabetes mellitus without complication ([facility] [id number]: E11.9  ICD-9-CM: 250.00  12/3/2016        Sepsis ([facility] ICD-10-CM: A41.9  ICD-9-CM: 038.9, 995.91  12/3/2016        Elevated pulse rate ICD-10-CM: R00.0  ICD-9-CM: 785.0  8/31/2015        Chest pain with normal coronary angiography ICD-10-CM: R07.9  ICD-9-CM: 786.50  7/28/2015        Morbid obesity with BMI of 45.0-49.9, adult ([facility] ICD-10-CM: E66.01, Z68.42  ICD-9-CM: 278.01, V85.42  7/28/2015        TMJ tenderness ICD-10-CM: M26.629  ICD-9-CM: 524.62  6/1/2015        Sinusitis ICD-10-CM: J32.9  ICD-9-CM: 473.9  6/1/2015        New daily persistent headache ICD-10-CM: G44.52  ICD-9-CM: 339.42  6/1/2015        Foot pain, bilateral ICD-10-CM: M79.671, M79.672  ICD-9-CM: 729.5  4/8/2015        Unspecified hereditary and idiopathic peripheral neuropathy ICD-10-CM: G60.9  ICD-9-CM: 356.9  4/8/2015        Chronic back pain ICD-10-CM: M54.9, G89.29  ICD-9-CM: 724.5, 338.29  3/13/2014        Obesity ICD-10-CM: E66.9  ICD-9-CM: 278.00  3/13/2014        Lyme disease ICD-10-CM: A69.20  ICD-9-CM: 088.81  3/13/2014        Anxiety ICD-10-CM: F41.9  ICD-9-CM: 300.00  3/13/2014        Diverticular disease ICD-10-CM: K57.90  ICD-9-CM: 562.10  3/13/2014        GERD (gastroesophageal reflux disease) ICD-10-CM: K21.9  ICD-9-CM: 530.81  3/13/2014        SOB (shortness of breath) ICD-10-CM: R06.02  ICD-9-CM: 786.05  3/12/2014               No results found for this visit on 09/28/20. 1. Cellulitis of left upper extremity  Stop bacitracin. Start Lotrisone. Do not cover with dressing.    - clotrimazole-betamethasone (LOTRISONE) topical cream; Apply  to affected area two (2) times a day. Dispense: 15 g; Refill: 2  - CULTURE, BODY FLUID W GRAM STAIN; Future        Follow-up and Dispositions    · Return in about 2 weeks (around 10/12/2020), or if symptoms worsen or fail to improve. I ADVISED PATIENT TO GO TO ER IF SYMPTOMS WORSEN , CHANGE OR FAILS TO IMPROVE. I have discussed the diagnosis with the patient and the intended plan as seen in the above orders. The patient has received an after-visit summary and questions were answered concerning future plans.   I have discussed medication side effects and warnings with the patient as well. The patient agrees and understands above plan.            Bay Doan MD

## 2020-09-28 NOTE — PROGRESS NOTES
1. Have you been to the ER, urgent care clinic since your last visit? Hospitalized since your last visit? No    2. Have you seen or consulted any other health care providers outside of the [address] [place] [address] since your last visit? Include any pap smears or colon screening. No     Pharmacy verified. [address], [place] - [address]    3 most recent PHQ Screens 9/28/2020   Little interest or pleasure in doing things Several days   Feeling down, depressed, irritable, or hopeless Several days   Total Score PHQ 2 2     Chief Complaint   Patient presents with    Fall    Bleeding/Bruising     Patient reports left arm brusing due to fall injury. Patient states not healing properly.       Visit Vitals  /75 (BP 1 Location: Right arm, BP Patient Position: Sitting)   Pulse (!) 107   Temp 97.9 °F (36.6 °C) (Oral)   Resp 16   Ht 5' 4\" (1.626 m)   Wt 182 lb (82.6 kg)   SpO2 97%   BMI 31.24 kg/m²

## 2020-10-07 NOTE — PROGRESS NOTES
Amenorrhea and irregular cycles. For EMB today. Has not had period since last visit in July. Vaginal pain with recent IC. Denies vaginal dryness, discharge, irritation. Continues with vasomotor sx. Labs nl in July      A&P ->  - EMB below  - offered to rpt hormone levels today, declines  - rec vag moisturizers, lubricants. Handout given.   - RTO 3wks to review results, recommendations    [facility]  OFFICE PROCEDURE PROGRESS NOTE        Chart reviewed for the following:   Ric Ball RN, have reviewed the History, Physical and updated the Allergic reactions for Ester performed immediately prior to start of procedure:   Ric Ball RN, have performed the following reviews on Dami Nesbitt prior to the start of the procedure:            * Patient was identified by name and date of birth   * Agreement on procedure being performed was verified  * Risks and Benefits explained to the patient  * Consent was signed and verified     Time: 3013    Date of procedure: 10/7/2020    Procedure performed by:  Finn Mcconnell MD    Provider assisted by: Elroy Rubi RN    Patient assisted by: self    How tolerated by patient: tolerated the procedure well with no complications    Post Procedural Pain Scale: 4 - Hurts Little More    Comments: none      Procedure note: Endometrial biopsy    Dami Nesbitt is a [id number],  50 y.o. female [facility] No LMP recorded. The patient has a history of There were no encounter diagnoses. and presents for an endometrial biopsy. Indications:   After the indications, risks, benefits, and alternatives to performing an endometrial biopsy were explained to the patient, her questions were answered and informed consent was obtained. Procedure: The patient was placed on the table in the dorsal lithotomy position. A bimanual exam showed the uterus to be anterior. The uterus was not enlarged. A speculum was placed in the vagina.  The cervix was visualized and prepped with zephrin. A tenaculum was placed on the anterior lip of the cervix for traction after infiltrating with 2cc 1% lidocaine. It was necessary to dilate the cervix with os finder. A pipelle was passed through the endocervical canal without difficulty. The uterus was sounded to 7 cm's. A scant amount of tissue was returned. This tissue was placed in formalin and sent to pathology. It was felt that a possible inadequate sample was obtained d/t scanty specimen  The patient tolerated the procedure well and she reported mild cramping. The tenaculum and speculum were removed. Pressure and silver nitrate applied with good hemostasis obtained. Post Procedural Status: The patient was observed for 10 minutes after the procedure. She had mild cramping at the time of discharge. There were no complications. The patient was discharged in stable condition.

## 2020-10-08 ENCOUNTER — OFFICE VISIT (OUTPATIENT)
Dept: OBGYN CLINIC | Age: 45
End: 2020-10-08
Payer: COMMERCIAL

## 2020-10-08 VITALS — BODY MASS INDEX: 30.73 KG/M2 | SYSTOLIC BLOOD PRESSURE: 122 MMHG | WEIGHT: 179 LBS | DIASTOLIC BLOOD PRESSURE: 91 MMHG

## 2020-10-08 DIAGNOSIS — N91.2 AMENORRHEA: Primary | ICD-10-CM

## 2020-10-08 DIAGNOSIS — N92.6 IRREGULAR MENSES: ICD-10-CM

## 2020-10-08 LAB
HCG URINE, QL. (POC): NEGATIVE
VALID INTERNAL CONTROL?: YES

## 2020-10-08 PROCEDURE — 58100 BIOPSY OF UTERUS LINING: CPT | Performed by: OBSTETRICS & GYNECOLOGY

## 2020-10-08 PROCEDURE — 81025 URINE PREGNANCY TEST: CPT | Performed by: OBSTETRICS & GYNECOLOGY

## 2020-10-09 ENCOUNTER — TRANSCRIBE ORDER (OUTPATIENT)
Dept: SCHEDULING | Age: 45
End: 2020-10-09

## 2020-10-15 ENCOUNTER — VIRTUAL VISIT (OUTPATIENT)
Dept: FAMILY MEDICINE CLINIC | Age: 45
End: 2020-10-15
Payer: COMMERCIAL

## 2020-10-15 DIAGNOSIS — B35.3 TINEA PEDIS OF BOTH FEET: Primary | ICD-10-CM

## 2020-10-15 DIAGNOSIS — L03.114 CELLULITIS OF LEFT UPPER EXTREMITY: ICD-10-CM

## 2020-10-15 PROCEDURE — 99213 OFFICE O/P EST LOW 20 MIN: CPT | Performed by: FAMILY MEDICINE

## 2020-10-15 RX ORDER — CLOTRIMAZOLE AND BETAMETHASONE DIPROPIONATE 10; .64 MG/G; MG/G
CREAM TOPICAL 2 TIMES DAILY
Qty: 15 G | Refills: 2 | Status: SHIPPED | OUTPATIENT
Start: 2020-10-15 | End: 2020-11-20 | Stop reason: SDUPTHER

## 2020-10-15 RX ORDER — TERBINAFINE HYDROCHLORIDE 250 MG/1
250 TABLET ORAL DAILY
Qty: 14 TAB | Refills: 0 | Status: SHIPPED | OUTPATIENT
Start: 2020-10-15 | End: 2020-11-12 | Stop reason: ALTCHOICE

## 2020-10-15 NOTE — PROGRESS NOTES
Stephen Romero is a 50 y.o. female      Chief Complaint   Patient presents with    Foot Pain     Lt foot pain x 2 weeks          1. Have you been to the ER, urgent care clinic since your last visit? Hospitalized since your last visit? no      2. Have you seen or consulted any other health care providers outside of the [address] [place] [address] since your last visit? Include any pap smears or colon screening.  no

## 2020-10-21 LAB
CPT CODES, 490044: NORMAL
CPT DISCLAIMER: NORMAL
CYTOLOGY SPEC DOC CYTO: NORMAL
DIAGNOSIS SYNOPSIS:: NORMAL
DX ICD CODE: NORMAL
PATH REPORT.GROSS SPEC: NORMAL
PATH REPORT.RELEVANT HX SPEC: NORMAL
PATHOLOGIST NAME: NORMAL
PDF IMAGE, 807507: NORMAL
SPECIMEN SOURCE: NORMAL

## 2020-10-26 ENCOUNTER — OFFICE VISIT (OUTPATIENT)
Dept: FAMILY MEDICINE CLINIC | Age: 45
End: 2020-10-26
Payer: COMMERCIAL

## 2020-10-26 VITALS
HEART RATE: 103 BPM | TEMPERATURE: 98.6 F | BODY MASS INDEX: 30.77 KG/M2 | WEIGHT: 180.2 LBS | RESPIRATION RATE: 16 BRPM | DIASTOLIC BLOOD PRESSURE: 67 MMHG | SYSTOLIC BLOOD PRESSURE: 99 MMHG | OXYGEN SATURATION: 99 % | HEIGHT: 64 IN

## 2020-10-26 DIAGNOSIS — R74.01 ELEVATED AST (SGOT): ICD-10-CM

## 2020-10-26 DIAGNOSIS — Z13.820 SCREENING FOR OSTEOPOROSIS: ICD-10-CM

## 2020-10-26 DIAGNOSIS — L03.119 CELLULITIS OF LOWER EXTREMITY, UNSPECIFIED LATERALITY: Primary | ICD-10-CM

## 2020-10-26 LAB
COMMENT, HOLDF: NORMAL
SAMPLES BEING HELD,HOLD: NORMAL

## 2020-10-26 PROCEDURE — 99214 OFFICE O/P EST MOD 30 MIN: CPT | Performed by: FAMILY MEDICINE

## 2020-10-26 RX ORDER — AMOXICILLIN AND CLAVULANATE POTASSIUM 875; 125 MG/1; MG/1
1 TABLET, FILM COATED ORAL EVERY 12 HOURS
Qty: 20 TAB | Refills: 0 | Status: SHIPPED | OUTPATIENT
Start: 2020-10-26 | End: 2020-11-05

## 2020-10-26 NOTE — PROGRESS NOTES
Patient stated name &     Chief Complaint   Patient presents with    Foot Pain     Bilateral foot pain       Labs     Requesting labs    Medication Refill     [facility] Maintenance Due   Topic    Pneumococcal 0-64 years (1 of 1 - PPSV23)    Foot Exam Q1     Eye Exam Retinal or Dilated     DTaP/Tdap/Td series (1 - Tdap)    Flu Vaccine (1)       Wt Readings from Last 3 Encounters:   10/26/20 180 lb 3.2 oz (81.7 kg)   10/08/20 179 lb (81.2 kg)   20 182 lb (82.6 kg)     Temp Readings from Last 3 Encounters:   10/26/20 98.6 °F (37 °C) (Oral)   20 97.9 °F (36.6 °C) (Oral)   20 97.7 °F (36.5 °C) (Oral)     BP Readings from Last 3 Encounters:   10/26/20 99/67   10/08/20 (!) 122/91   20 128/75     Pulse Readings from Last 3 Encounters:   10/26/20 (!) 103   20 (!) 107   20 75         Learning Assessment:  :     Learning Assessment 10/21/2016 3/12/2014   PRIMARY LEARNER Patient Patient   HIGHEST LEVEL OF EDUCATION - PRIMARY LEARNER  - GRADUATED HIGH SCHOOL OR GED   BARRIERS PRIMARY LEARNER - READING   CO-LEARNER CAREGIVER - Yes   CO-LEARNER NAME - [address]    NEED - No   LEARNER PREFERENCE PRIMARY DEMONSTRATION LISTENING   LEARNING SPECIAL TOPICS - SOB why   ANSWERED BY Patient patient   RELATIONSHIP SELF SELF       Depression Screening:  :     3 most recent PHQ Screens 10/26/2020   Little interest or pleasure in doing things Not at all   Feeling down, depressed, irritable, or hopeless Not at all   Total Score PHQ 2 0       Fall Risk Assessment:  :     No flowsheet data found. Abuse Screening:  :     No flowsheet data found. Coordination of Care Questionnaire:  :     1) Have you been to an emergency room, urgent care clinic since your last visit? No    Hospitalized since your last visit?  No             2) Have you seen or consulted any other health care providers outside of [address] [place] [address] since your last visit? No  (Include any pap smears or colon screenings in this section.)    Patient is accompanied by self I have received verbal consent from Sheridan Christian to discuss any/all medical information while they are present in the room.

## 2020-10-26 NOTE — PROGRESS NOTES
Zuleima Sandoval is a 50 y.o. female who presents today with the following:    HPI  Chief Complaint   Patient presents with    Foot Pain     Bilateral foot pain       Labs     Requesting labs    Medication Refill     Flexerill       1. Cellulitis of lower extremity, bilateral    Patient reports continued erythema and pain of bilateral feet. Erythema is in the soles of the feet. She has dry, peeling skin. It is warm and erythematous. She also has tender spots on palpation of soles of bilateral feet. She was prescribed Lamisil, she continues to take it. Denies fevers    2. Elevated AST (SGOT)  Requests lab work. AST was elevated at last lab check. 3. Screening for osteoporosis  Patient wants screening for osteoporosis. Has history of gastric bypass. Review of Systems   Constitutional: Negative. HENT: Negative. Eyes: Negative. Respiratory: Negative. Cardiovascular: Negative. Gastrointestinal: Negative. Genitourinary: Negative. Musculoskeletal: Negative. Skin: Positive for itching and rash. Neurological: Negative. Endo/Heme/Allergies: Negative. Psychiatric/Behavioral: Negative. Physical Exam  Vitals signs and nursing note reviewed. HENT:      Head: Normocephalic and atraumatic. Right Ear: External ear normal.      Left Ear: External ear normal.      Nose: Nose normal.      Mouth/Throat:      Pharynx: No oropharyngeal exudate. Eyes:      Conjunctiva/sclera: Conjunctivae normal.   Neck:      Musculoskeletal: Normal range of motion and neck supple. Thyroid: No thyromegaly. Cardiovascular:      Rate and Rhythm: Normal rate and regular rhythm. Pulmonary:      Effort: Pulmonary effort is normal.      Breath sounds: Normal breath sounds. Abdominal:      General: Bowel sounds are normal. There is no distension. Palpations: Abdomen is soft. Tenderness: There is no abdominal tenderness. Musculoskeletal: Normal range of motion.    Feet: Right foot:      Protective Sensation: 10 sites tested. 10 sites sensed. Skin integrity: Skin breakdown, warmth and dry skin present. Left foot:      Protective Sensation: 10 sites tested. 10 sites sensed. Skin integrity: Skin breakdown, erythema, warmth and dry skin present. Lymphadenopathy:      Cervical: No cervical adenopathy. Skin:     General: Skin is warm and dry. Neurological:      Mental Status: She is alert and oriented to person, place, and time. Psychiatric:         Mood and Affect: Mood and affect normal.       BP 99/67   Pulse (!) 103   Temp 98.6 °F (37 °C) (Oral)   Resp 16   Ht 5' 4\" (1.626 m)   Wt 180 lb 3.2 oz (81.7 kg)   SpO2 99%   BMI 30.93 kg/m²     Allergies   Allergen Reactions    Morphine Anaphylaxis     July 5, 2016 administered after surgery for pain.  Bacitracin Other (comments)    Doxycycline Hives    Morphine Unknown (comments)    Other Medication Palpitations     Peripheral Block patient noted difficulty breathing and palpitations. Current Outpatient Medications   Medication Sig    amoxicillin-clavulanate (AUGMENTIN) 875-125 mg per tablet Take 1 Tab by mouth every twelve (12) hours for 10 days.  terbinafine HCL (LAMISIL) 250 mg tablet Take 1 Tab by mouth daily. Indications: athlete's foot    clotrimazole-betamethasone (LOTRISONE) topical cream Apply  to affected area two (2) times a day.  amLODIPine (NORVASC) 10 mg tablet Take 1 Tab by mouth daily.  topiramate (TOPAMAX) 50 mg tablet Take 1 Tab by mouth two (2) times a day. Indications: migraine prevention    sertraline (ZOLOFT) 50 mg tablet Take 1 Tab by mouth daily. Indications: repeated episodes of anxiety    atenoloL (TENORMIN) 50 mg tablet     busPIRone (BUSPAR) 10 mg tablet Take 1 Tab by mouth three (3) times daily. Indications: repeated episodes of anxiety    diclofenac EC (VOLTAREN) 50 mg EC tablet Take 1 Tab by mouth two (2) times a day.     traZODone (DESYREL) 100 mg tablet Take 2 Tabs by mouth nightly as needed for Sleep.  linaclotide (LINZESS PO) Take  by mouth.  omeprazole (PRILOSEC) 20 mg capsule Take 20 mg by mouth daily as needed (if heartburn persists after first dose).  cyclobenzaprine (FLEXERIL) 5 mg tablet Take 10 mg by mouth nightly as needed for Muscle Spasm(s).  albuterol (PROVENTIL HFA, VENTOLIN HFA, PROAIR HFA) 90 mcg/actuation inhaler     Blood Pressure Test Kit-Large ([employer] Talking Arm BP Monitr) kit Check bp daily    esomeprazole magnesium (NEXIUM PO) Take 20 mg by mouth two (2) times a day. No current facility-administered medications for this visit.         Past Medical History:   Diagnosis Date    Arrhythmia     PAC    Arthritis     Cellulitis     cellulitis    Chest pain     Diabetes ([facility]     GERD (gastroesophageal reflux disease)     H/O seasonal allergies     Headache     Hearing loss 7/10/2020    Ill-defined condition     \"twisted\" kidney right    Lyme disease     Obesity     Plantar fasciitis     PUD (peptic ulcer disease)     Trigger finger, right index finger 2/2016    Vitamin D deficiency        Past Surgical History:   Procedure Laterality Date    HX GASTRIC BYPASS  2015    HX GI      HX HEART CATHETERIZATION  7/28/15    HX ORTHOPAEDIC  7-30-15    Rt. rotator cuff repair     HX TONSILLECTOMY      ME COLSC FLX W/NDSC US XM RCTM ET AL LMTD&ADJ [facility]  2011       Problem List  Date Reviewed: 10/26/2020          Codes Class Noted    Hearing loss ICD-10-CM: H91.90  ICD-9-CM: 389.9  7/10/2020        Migraine without status migrainosus, not intractable ICD-10-CM: Y35.501  ICD-9-CM: 346.90  5/20/2020        Anxiety ICD-10-CM: F41.9  ICD-9-CM: 300.00  5/20/2020        Gastroparesis ICD-10-CM: K31.84  ICD-9-CM: 536.3  5/20/2020        Other hyperlipidemia ICD-10-CM: U39.54  ICD-9-CM: 272.4  5/20/2020        Controlled type 2 diabetes mellitus without complication, without long-term current use of insulin ([facility] [address] ICD-10-CM: E11.9  ICD-9-CM: 250.00  5/20/2020        Cellulitis and abscess of neck ICD-10-CM: L03.221, L02.11  ICD-9-CM: 682.1  12/3/2016        Controlled type 2 diabetes mellitus without complication ([place] [address] LHE-15-ZE: E11.9  ICD-9-CM: 250.00  12/3/2016        Sepsis ([place] [address] ICD-10-CM: A41.9  ICD-9-CM: 038.9, 995.91  12/3/2016        Elevated pulse rate ICD-10-CM: R00.0  ICD-9-CM: 785.0  8/31/2015        Chest pain with normal coronary angiography ICD-10-CM: R07.9  ICD-9-CM: 786.50  7/28/2015        Morbid obesity with BMI of 45.0-49.9, adult ([place] [address] ICD-10-CM: E66.01, Z68.42  ICD-9-CM: 278.01, V85.42  7/28/2015        TMJ tenderness ICD-10-CM: M26.629  ICD-9-CM: 524.62  6/1/2015        Sinusitis ICD-10-CM: J32.9  ICD-9-CM: 473.9  6/1/2015        New daily persistent headache ICD-10-CM: G44.52  ICD-9-CM: 339.42  6/1/2015        Foot pain, bilateral ICD-10-CM: M79.671, M79.672  ICD-9-CM: 729.5  4/8/2015        Unspecified hereditary and idiopathic peripheral neuropathy ICD-10-CM: G60.9  ICD-9-CM: 356.9  4/8/2015        Chronic back pain ICD-10-CM: M54.9, G89.29  ICD-9-CM: 724.5, 338.29  3/13/2014        Obesity ICD-10-CM: E66.9  ICD-9-CM: 278.00  3/13/2014        Lyme disease ICD-10-CM: A69.20  ICD-9-CM: 088.81  3/13/2014        Anxiety ICD-10-CM: F41.9  ICD-9-CM: 300.00  3/13/2014        Diverticular disease ICD-10-CM: K57.90  ICD-9-CM: 562.10  3/13/2014        GERD (gastroesophageal reflux disease) ICD-10-CM: K21.9  ICD-9-CM: 530.81  3/13/2014        SOB (shortness of breath) ICD-10-CM: R06.02  ICD-9-CM: 786.05  3/12/2014               No results found for this visit on 10/26/20. 1. Cellulitis of lower extremity, unspecified laterality    - amoxicillin-clavulanate (AUGMENTIN) 875-125 mg per tablet; Take 1 Tab by mouth every twelve (12) hours for 10 days. Dispense: 20 Tab; Refill: 0  - HM DIABETES FOOT EXAM    2. Elevated AST (SGOT)    - METABOLIC PANEL, COMPREHENSIVE; Future  - GGT;  Future  - METABOLIC PANEL, COMPREHENSIVE  - GGT    3. Screening for osteoporosis    - DEXA BONE DENSITY STUDY AXIAL; Future        Follow-up and Dispositions    · Return in about 2 weeks (around 11/9/2020), or if symptoms worsen or fail to improve. I ADVISED PATIENT TO GO TO ER IF SYMPTOMS WORSEN , CHANGE OR FAILS TO IMPROVE. I have discussed the diagnosis with the patient and the intended plan as seen in the above orders. The patient has received an after-visit summary and questions were answered concerning future plans. I have discussed medication side effects and warnings with the patient as well. The patient agrees and understands above plan.            Maria A Gomez MD

## 2020-10-27 DIAGNOSIS — R79.89 ELEVATED LFTS: Primary | ICD-10-CM

## 2020-10-27 LAB
ALBUMIN SERPL-MCNC: 4 G/DL (ref 3.5–5)
ALBUMIN/GLOB SERPL: 1.1 {RATIO} (ref 1.1–2.2)
ALP SERPL-CCNC: 88 U/L (ref 45–117)
ALT SERPL-CCNC: 39 U/L (ref 12–78)
ANION GAP SERPL CALC-SCNC: 12 MMOL/L (ref 5–15)
AST SERPL-CCNC: 40 U/L (ref 15–37)
BILIRUB SERPL-MCNC: 0.6 MG/DL (ref 0.2–1)
BUN SERPL-MCNC: 7 MG/DL (ref 6–20)
BUN/CREAT SERPL: 8 (ref 12–20)
CALCIUM SERPL-MCNC: 10.1 MG/DL (ref 8.5–10.1)
CHLORIDE SERPL-SCNC: 101 MMOL/L (ref 97–108)
CO2 SERPL-SCNC: 25 MMOL/L (ref 21–32)
CREAT SERPL-MCNC: 0.86 MG/DL (ref 0.55–1.02)
GGT SERPL-CCNC: 80 U/L (ref 5–55)
GLOBULIN SER CALC-MCNC: 3.7 G/DL (ref 2–4)
GLUCOSE SERPL-MCNC: 83 MG/DL (ref 65–100)
POTASSIUM SERPL-SCNC: 4.1 MMOL/L (ref 3.5–5.1)
PROT SERPL-MCNC: 7.7 G/DL (ref 6.4–8.2)
SODIUM SERPL-SCNC: 138 MMOL/L (ref 136–145)

## 2020-10-27 NOTE — PROGRESS NOTES
Please inform patient I have ordered liver ultrasound for elevated liver enzymes. Patient to schedule at [address] [facility]. Please give central scheduling information to patient.   Thanks

## 2020-10-28 NOTE — PROGRESS NOTES
Chief Complaint   Follow-up      [facility]  Tammi Roche is a 50 y.o. female who presents for the evaluation of her endometrial biopsy results. No LMP recorded. (Menstrual status: Other (see Comment)). The patient complains of just cramping and some spotting that day and none since. Still having hot flashes.   Seeing cardiology for arrythmia, HTN    EMB (10/8/20) scant atrophic, benign    Labs (20)  TSH=1.340  PRL=8.2  FHS=19  LH=16  Z8=480      Past Medical History:   Diagnosis Date    Arrhythmia     PAC    Arthritis     Cellulitis     cellulitis    Chest pain     Diabetes ([facility] [address]     GERD (gastroesophageal reflux disease)     H/O seasonal allergies     Headache     Hearing loss 7/10/2020    Ill-defined condition     \"twisted\" kidney right    Lyme disease     Obesity     Plantar fasciitis     PUD (peptic ulcer disease)     Trigger finger, right index finger 2016    Vitamin D deficiency      Past Surgical History:   Procedure Laterality Date    HX GASTRIC BYPASS      HX GI      HX HEART CATHETERIZATION  7/28/15    HX ORTHOPAEDIC  7-30-15    Rt. rotator cuff repair     HX TONSILLECTOMY      SD COLSC FLX W/NDSC US XM RCTM ET AL LMTD&ADJ [facility]       Social History     Occupational History    Not on file   Tobacco Use    Smoking status: Current Some Day Smoker     Packs/day: 0.50    Smokeless tobacco: Never Used   Substance and Sexual Activity    Alcohol use: Not Currently     Comment: rare    Drug use: Never    Sexual activity: Yes     Partners: Male     Family History   Problem Relation Age of Onset    Cancer Paternal Aunt         colon polyps    Cancer Paternal Uncle         colon cancer    Stroke Mother     Heart Disease Mother     Elevated Lipids Mother     Cancer Father         prostate    Heart Disease Father         death by MI    Coronary Artery Disease Other         mother  64, father age 67 of MI    Other Brother         Passed while sleeping at 64, neck blockages and legs, hx of vascular surg    Hypertension Brother     Diabetes Brother     Anesth Problems Neg Hx        Allergies   Allergen Reactions    Morphine Anaphylaxis     July 5, 2016 administered after surgery for pain.  Bacitracin Other (comments)    Doxycycline Hives    Morphine Unknown (comments)    Other Medication Palpitations     Peripheral Block patient noted difficulty breathing and palpitations. Prior to Admission medications    Medication Sig Start Date End Date Taking? Authorizing Provider   cyclobenzaprine (FLEXERIL) 5 mg tablet Take 2 Tabs by mouth nightly as needed for Muscle Spasm(s). 10/29/20  Yes Skye Chapa MD   amoxicillin-clavulanate (AUGMENTIN) 875-125 mg per tablet Take 1 Tab by mouth every twelve (12) hours for 10 days. 10/26/20 11/5/20 Yes Skye Chapa MD   terbinafine HCL (LAMISIL) 250 mg tablet Take 1 Tab by mouth daily. Indications: athlete's foot 10/15/20  Yes Skye Chpaa MD   clotrimazole-betamethasone (LOTRISONE) topical cream Apply  to affected area two (2) times a day. 10/15/20  Yes Skye Chapa MD   amLODIPine (NORVASC) 10 mg tablet Take 1 Tab by mouth daily. 9/14/20  Yes Skye Chapa MD   sertraline (ZOLOFT) 50 mg tablet Take 1 Tab by mouth daily. Indications: repeated episodes of anxiety 8/27/20  Yes Skye Chapa MD   atenoloL (TENORMIN) 50 mg tablet  5/21/20  Yes Provider, Historical   albuterol (PROVENTIL HFA, VENTOLIN HFA, PROAIR HFA) 90 mcg/actuation inhaler  3/22/20  Yes Provider, Historical   busPIRone (BUSPAR) 10 mg tablet Take 1 Tab by mouth three (3) times daily. Indications: repeated episodes of anxiety 7/8/20  Yes Skye Chapa MD   diclofenac EC (VOLTAREN) 50 mg EC tablet Take 1 Tab by mouth two (2) times a day. 7/8/20  Yes Skye Chapa MD   traZODone (DESYREL) 100 mg tablet Take 2 Tabs by mouth nightly as needed for Sleep.  7/8/20  Yes Skye Chapa MD   Blood Pressure Test Kit-Large ([employer] Talking Arm BP Monitr) kit Check bp daily 7/8/20  Yes Argelia Craig MD   esomeprazole magnesium (NEXIUM PO) Take 20 mg by mouth two (2) times a day. Yes Provider, Historical   linaclotide (LINZESS PO) Take  by mouth. Yes Provider, Historical   omeprazole (PRILOSEC) 20 mg capsule Take 20 mg by mouth daily as needed (if heartburn persists after first dose). Yes Provider, Historical   topiramate (TOPAMAX) 50 mg tablet Take 1 Tab by mouth two (2) times a day.  Indications: migraine prevention 8/27/20   Argelia Craig MD        Review of Systems: History obtained from the patient  Constitutional: negative for weight loss, fever, night sweats  HEENT: negative for hearing loss, earache, congestion, snoring, sorethroat  CV: negative for chest pain, palpitations, edema  Resp: negative for cough, shortness of breath, wheezing  Breast: negative for breast lumps, nipple discharge, galactorrhea  GI: negative for change in bowel habits, abdominal pain, black or bloody stools  : negative for frequency, dysuria, hematuria, vaginal discharge  MSK: negative for back pain, joint pain, muscle pain  Skin: negative for itching, rash, hives; +cellulitis, on abx  Neuro: negative for dizziness, headache, confusion, weakness  Psych: negative for anxiety, depression, change in mood  Heme/lymph: negative for bleeding, bruising, pallor    Objective:  Visit Vitals  /73   Wt 178 lb (80.7 kg)   BMI 30.55 kg/m²       Physical Exam:   PHYSICAL EXAMINATION    Constitutional  · Appearance: well-nourished, well developed, alert, in no acute distress    HENT  · Head and Face: appears normal    Skin  · General Inspection: no rash, no lesions identified    Neurologic/Psychiatric  · Mental Status:  · Orientation: grossly oriented to person, place and time  · Mood and Affect: mood normal, affect appropriate    Assessment:   Perimenopausal bleeding  Vasomotor sx    Plan:   Cyclic prometrium 367BT daily 12d/mo eRX #36 RFx2  If vasomotor sx persist, could consider estrogen if cleared by cards  Cont menstrual calendar  RTO for AE 6/2021, sooner prn      Orders Placed This Encounter    progesterone (Prometrium) 200 mg capsule     Sig: Take 1 Cap by mouth nightly. 12 days per month.      Dispense:  36 Cap     Refill:  2

## 2020-10-29 ENCOUNTER — OFFICE VISIT (OUTPATIENT)
Dept: OBGYN CLINIC | Age: 45
End: 2020-10-29
Payer: COMMERCIAL

## 2020-10-29 ENCOUNTER — TELEPHONE (OUTPATIENT)
Dept: OBGYN CLINIC | Age: 45
End: 2020-10-29

## 2020-10-29 VITALS — DIASTOLIC BLOOD PRESSURE: 73 MMHG | SYSTOLIC BLOOD PRESSURE: 103 MMHG | BODY MASS INDEX: 30.55 KG/M2 | WEIGHT: 178 LBS

## 2020-10-29 DIAGNOSIS — N92.4 ABNORMAL PERIMENOPAUSAL BLEEDING: Primary | ICD-10-CM

## 2020-10-29 DIAGNOSIS — N95.1 MENOPAUSAL SYMPTOMS: ICD-10-CM

## 2020-10-29 PROCEDURE — 99213 OFFICE O/P EST LOW 20 MIN: CPT | Performed by: OBSTETRICS & GYNECOLOGY

## 2020-10-29 RX ORDER — PROGESTERONE 200 MG/1
200 CAPSULE ORAL
Qty: 36 CAP | Refills: 2 | Status: SHIPPED | OUTPATIENT
Start: 2020-10-29 | End: 2020-10-29

## 2020-10-29 RX ORDER — PROGESTERONE 200 MG/1
CAPSULE ORAL
Qty: 36 CAP | Refills: 2 | Status: SHIPPED | OUTPATIENT
Start: 2020-10-29 | End: 2020-10-29

## 2020-10-29 RX ORDER — PROGESTERONE 200 MG/1
CAPSULE ORAL
Qty: 36 CAP | Refills: 2 | Status: SHIPPED | OUTPATIENT
Start: 2020-10-29 | End: 2021-01-06

## 2020-10-29 RX ORDER — CYCLOBENZAPRINE HCL 5 MG
10 TABLET ORAL
Qty: 30 TAB | Refills: 0 | Status: SHIPPED | OUTPATIENT
Start: 2020-10-29 | End: 2020-11-20 | Stop reason: SDUPTHER

## 2020-10-29 NOTE — TELEPHONE ENCOUNTER
Call received at 3:55Pm from the pharmacy    50year old patient last seen in the office today    pharmacy calling for clarification on the prescription for progesterone (Prometrium) 200 mg capsule     Please clarify the signature  Is the patient only to take for 12 nights per month or nightly    Please advise

## 2020-11-03 NOTE — TELEPHONE ENCOUNTER
Call received at 10:25am    Patient calling to clarify how to take her medication. progesterone (Prometrium) 200 mg capsule         This nurse advised of the signature confirmed by DR Nisha Winston    Patient verbalized understanding

## 2020-11-06 ENCOUNTER — HOSPITAL ENCOUNTER (OUTPATIENT)
Dept: MAMMOGRAPHY | Age: 45
Discharge: HOME | End: 2020-11-06
Attending: FAMILY MEDICINE
Payer: COMMERCIAL

## 2020-11-06 ENCOUNTER — HOSPITAL ENCOUNTER (OUTPATIENT)
Dept: ULTRASOUND IMAGING | Age: 45
Discharge: HOME | End: 2020-11-06
Attending: FAMILY MEDICINE
Payer: COMMERCIAL

## 2020-11-06 DIAGNOSIS — R79.89 ELEVATED LFTS: ICD-10-CM

## 2020-11-06 DIAGNOSIS — Z13.820 SCREENING FOR OSTEOPOROSIS: ICD-10-CM

## 2020-11-06 PROCEDURE — 77080 DXA BONE DENSITY AXIAL: CPT

## 2020-11-06 PROCEDURE — 76705 ECHO EXAM OF ABDOMEN: CPT

## 2020-11-12 ENCOUNTER — OFFICE VISIT (OUTPATIENT)
Dept: NEUROLOGY | Age: 45
End: 2020-11-12
Payer: COMMERCIAL

## 2020-11-12 VITALS
TEMPERATURE: 97.3 F | HEART RATE: 115 BPM | BODY MASS INDEX: 30.55 KG/M2 | WEIGHT: 178 LBS | OXYGEN SATURATION: 98 % | DIASTOLIC BLOOD PRESSURE: 76 MMHG | SYSTOLIC BLOOD PRESSURE: 114 MMHG

## 2020-11-12 DIAGNOSIS — R41.3 MEMORY LOSS: ICD-10-CM

## 2020-11-12 DIAGNOSIS — M79.7 FIBROMYALGIA: ICD-10-CM

## 2020-11-12 DIAGNOSIS — G43.711 INTRACTABLE CHRONIC MIGRAINE WITHOUT AURA AND WITH STATUS MIGRAINOSUS: ICD-10-CM

## 2020-11-12 DIAGNOSIS — I10 ESSENTIAL HYPERTENSION: ICD-10-CM

## 2020-11-12 DIAGNOSIS — K31.84 GASTROPARESIS: ICD-10-CM

## 2020-11-12 DIAGNOSIS — R55 SYNCOPE, UNSPECIFIED SYNCOPE TYPE: Primary | ICD-10-CM

## 2020-11-12 DIAGNOSIS — R25.1 TREMOR: ICD-10-CM

## 2020-11-12 DIAGNOSIS — F41.9 ANXIETY: ICD-10-CM

## 2020-11-12 PROCEDURE — 99214 OFFICE O/P EST MOD 30 MIN: CPT | Performed by: NURSE PRACTITIONER

## 2020-11-12 RX ORDER — AMLODIPINE BESYLATE 10 MG/1
TABLET ORAL
Qty: 90 TAB | Refills: 1 | Status: SHIPPED | OUTPATIENT
Start: 2020-11-12 | End: 2020-12-14 | Stop reason: SDUPTHER

## 2020-11-12 RX ORDER — PREGABALIN 50 MG/1
50 CAPSULE ORAL 3 TIMES DAILY
Qty: 90 CAP | Refills: 1 | Status: SHIPPED | OUTPATIENT
Start: 2020-11-12 | End: 2021-01-08

## 2020-11-12 NOTE — PROGRESS NOTES
[address] [facility],5Th [address]  [address]   [address] [place] [address] [place] [address] [address]   [phone] Office   [phone] Fax           Date:  20     Name:  Damian Bashir  :  1975  MRN:  [id number]     PCP:  Sreekanth Pappas MD    Chief Complaint   Patient presents with    Results     HISTORY OF PRESENT ILLNESS: Follow up for migraines. At her last office visit, she had reported at least 3 syncopal episodes in the last year. We discussed her potential etiologies for these episodes to include being cardiac versus neurologic. For further evaluation, she was sent for routine sleep deprived EEG as well as a carotid Doppler. Her routine EEG was normal.  Carotid Doppler was normal.  She was not able to maintain her sleep deprivation in order to get the sleep deprived EEG performed. All other labs and testing was normal.  She has had another syncopal episode a couple of weeks ago though this is poorly described. In addition to this, she is complaining of memory loss and she does have a family history of this as well. She has fibromyalgia and it was suggested she try Lyrica but was not provided a prescription for this and she has not been able to get in touch with her rheumatologist who suggested it. Her migraines are not any better and continue to be a chronic daily issue. Brother and mother passed at age 64 due to heart attack. Mother also had an aneurysm at the time as well. Brother had a history of a bypass and severe arteriolosclerotic disease. Continues to have complaints of tremor. Recap from LOV: This is a nice 17-year-old, right-handed, white  female who presented today after recent ER visit secondary to syncope. Per her report, she has had at least 3 syncopal episodes in the last year. We discussed potential etiologies of her syncope to include cardiac versus neurologic.   For further evaluation of any underlying neurologic issue she will be sent for routine and sleep deprived EEG as well as a carotid Doppler. She should continue to follow-up with cardiology for cardiac work-up. Of note, she does have significant family history of atherosclerosis and heart disease. She has a personal history of essential hypertension which at present is not controlled and had been diagnosed with diabetes at some point in the past.  Given her personal and family history, she will be sent for metabolic studies to include a CMP, CBC, hemoglobin A1c, fasting lipids, and thyroid function testing. For treatment of the hypertension as well as for migraine prevention and tremor control, she was encouraged to start the Inderal LA 60 mg daily. She was instructed to obtain a home blood pressure cuff to monitor her blood pressures. Follow-up in approximately 1 month. Current Outpatient Medications   Medication Sig    cyclobenzaprine (FLEXERIL) 5 mg tablet Take 2 Tabs by mouth nightly as needed for Muscle Spasm(s).  progesterone (Prometrium) 200 mg capsule 12 nights per month.  clotrimazole-betamethasone (LOTRISONE) topical cream Apply  to affected area two (2) times a day.  amLODIPine (NORVASC) 10 mg tablet Take 1 Tab by mouth daily.  sertraline (ZOLOFT) 50 mg tablet Take 1 Tab by mouth daily. Indications: repeated episodes of anxiety    atenoloL (TENORMIN) 50 mg tablet     busPIRone (BUSPAR) 10 mg tablet Take 1 Tab by mouth three (3) times daily. Indications: repeated episodes of anxiety    diclofenac EC (VOLTAREN) 50 mg EC tablet Take 1 Tab by mouth two (2) times a day.  traZODone (DESYREL) 100 mg tablet Take 2 Tabs by mouth nightly as needed for Sleep.  linaclotide (LINZESS PO) Take  by mouth.  omeprazole (PRILOSEC) 20 mg capsule Take 20 mg by mouth daily as needed (if heartburn persists after first dose). No current facility-administered medications for this visit.       Allergies   Allergen Reactions    Morphine Anaphylaxis     July 5, 2016 administered after surgery for pain.  Bacitracin Other (comments)    Doxycycline Hives    Morphine Unknown (comments)    Other Medication Palpitations     Peripheral Block patient noted difficulty breathing and palpitations.      Past Medical History:   Diagnosis Date    Arrhythmia     PAC    Arthritis     Cellulitis     cellulitis    Chest pain     Diabetes [facility]     GERD (gastroesophageal reflux disease)     H/O seasonal allergies     Headache     Hearing loss 7/10/2020    Ill-defined condition     \"twisted\" kidney right    Lyme disease     Obesity     Plantar fasciitis     PUD (peptic ulcer disease)     Trigger finger, right index finger 2/2016    Vitamin D deficiency      Past Surgical History:   Procedure Laterality Date    HX GASTRIC BYPASS  2015    HX GI      HX HEART CATHETERIZATION  7/28/15    HX ORTHOPAEDIC  7-30-15    Rt. rotator cuff repair     HX TONSILLECTOMY      IN COLSC FLX W/NDSC US XM RCTM ET AL LMTD&ADJ [facility]  2011     Social History     Socioeconomic History    Marital status:      Spouse name: Not on file    Number of children: Not on file    Years of education: Not on file    Highest education level: Not on file   Occupational History    Not on file   Social Needs    Financial resource strain: Not on file    Food insecurity     Worry: Not on file     Inability: Not on file   [employer] needs     Medical: Not on file     Non-medical: Not on file   Tobacco Use    Smoking status: Current Some Day Smoker     Packs/day: 0.50    Smokeless tobacco: Never Used   Substance and Sexual Activity    Alcohol use: Not Currently     Comment: rare    Drug use: Never    Sexual activity: Yes     Partners: Male   Lifestyle    Physical activity     Days per week: Not on file     Minutes per session: Not on file    Stress: Not on file   Relationships    Social connections     Talks on phone: Not on file Gets together: Not on file     Attends Holiness service: Not on file     Active member of club or organization: Not on file     Attends meetings of clubs or organizations: Not on file     Relationship status: Not on file    Intimate partner violence     Fear of current or ex partner: Not on file     Emotionally abused: Not on file     Physically abused: Not on file     Forced sexual activity: Not on file   Other Topics Concern    Not on file   Social History Narrative    ** Merged History Encounter **          Family History   Problem Relation Age of Onset    Cancer Paternal Aunt         colon polyps    Cancer Paternal Uncle         colon cancer    Stroke Mother     Heart Disease Mother     Elevated Lipids Mother     Cancer Father         prostate    Heart Disease Father         death by MI    Coronary Artery Disease Other         mother  64, father age 67 of MI    Other Brother         Passed while sleeping at 64, neck blockages and legs, hx of vascular surg    Hypertension Brother     Diabetes Brother     Anesth Problems Neg Hx        PHYSICAL EXAMINATION:    Visit Vitals  /76 (BP 1 Location: Left arm, BP Patient Position: Sitting)   Pulse (!) 115   Temp 97.3 °F (36.3 °C)   Wt 80.7 kg (178 lb)   SpO2 98%   BMI 30.55 kg/m²     General:  Well defined, nourished, and groomed individual in no acute distress. Neck: Supple, nontender, no bruits, no pain with resistance to active range of motion. Heart: Regular rate and rhythm, no murmurs, rub, or gallop. Normal S1S2. Lungs:  Clear to auscultation bilaterally with equal chest expansion, no cough, no wheeze  Musculoskeletal:  Extremities revealed no edema and had full range of motion of joints. She does have a bandage to the left arm due to the cut and swelling sustained as a result of her syncopal episode. There is significant bruising up the arm.    Psych:  Good mood and bright affect    NEUROLOGICAL EXAMINATION:     Mental Status: Alert and oriented to person, place, and time with recent and remote memory intact. Attention span and concentration are normal. Speech is fluent with a full fund of knowledge. Cranial Nerves:  I: smell Not tested   II: visual fields Full to confrontation   II: pupils Equal, round, reactive to light   II: optic disc No papilledema   III,VII: ptosis none   III,IV,VI: extraocular muscles  Full ROM   V: mastication normal   V: facial light touch sensation  normal   VII: facial muscle function   symmetric   VIII: hearing symmetric   IX: soft palate elevation  normal   XI: trapezius strength  5/5   XI: sternocleidomastoid strength 5/5   XI: neck flexion strength  5/5   XII: tongue  midline     Motor Examination: Normal tone, bulk, and strength, 5/5 muscle strength throughout. There is some weakness noted in the left arm which is secondary to injury. Coordination:  Finger to nose was normal.   No resting or intention tremor    Gait and Station:  Steady while walking on toes, heels, and with tandem walking. Normal arm swing. No Rhomberg or pronator drift. No muscle wasting or fasiculations noted. Reflexes:  DTRs 2+ throughout. ASSESSMENT AND PLAN    ICD-10-CM ICD-9-CM    1. Syncope, unspecified syncope type  R55 780.2 REFERRAL TO NEUROLOGY   2. Intractable chronic migraine without aura and with status migrainosus  G43.711 346.73    3. Tremor  R25.1 781.0    4. Anxiety  F41.9 300.00 REFERRAL TO NEUROPSYCHOLOGY   5. Memory loss  R41.3 780.93 REFERRAL TO NEUROPSYCHOLOGY   6. Gastroparesis  K31.84 536.3 REFERRAL TO NEUROLOGY   7. Fibromyalgia  M79.7 729.1 pregabalin (LYRICA) 50 mg capsule     Patient presented for follow up of migraines, syncope, and tremor. Migraines are unchanged and we discussed potential treatment options. Since her fibromyalgia is also bothersome, she was started on Lyrica 50mg tid as this has potential for being beneficial for both issues.  Discussed that this is a low dose and certainly for fibromyalgia it may need to be increased. With regard to the syncope, we will try to get the sleep deprived EEG. She will be set up for ANS testing especially given gastroparesis. She had additional concerns regarding her memory loss. This is likely being driven by her anxiety though she does have a family history of dementia. We will have her set up for neuropsychological testing. Discussed essential tremor and given normal brain MRI, this is actually less of a concern. She has noted a family history of this as well and we discussed that this does not look Parkinsonian. We discussed the difference between a Parkinson related tremor and ET. We discussed that this is a nuisance problem that will likely worsen as she gets older. Follow up virtually to assess response to Lyrica. Mia Steele

## 2020-11-17 ENCOUNTER — TELEPHONE (OUTPATIENT)
Dept: NEUROLOGY | Age: 45
End: 2020-11-17

## 2020-11-17 NOTE — LETTER
12/2/2020 10:23 AM    Ms. Davie Hurst [id number]    [place]  The instructions on how to prepare for the ANS testing    Hold prior to testing  norvasc-10 days  zoloft-6 days  Atenolol-2 days  buspar-1 day  Trazodone-3 days                          Are there other special preparations? If your test is in the morning do not eat food for 12 hours before the test.  If your test is in the afternoon eat a light breakfast four to six hours before the test.  Avoid alcohol caffeine or nicotine 12 hours before tests. Avoid vigorous exercise for 24 hours  Wear soft non-restrictive clothing. Avoid clothing that may restrict blood flow including stockings and corsets. Remove elastic stockings. NO sports bra. Avoid stressful circumstances. Your test will be more successful if you are rested and relaxed. Arrive early  Please remove all jewelry prior to testing  Do Not engage in discussion with the  during the test because this may affect your autonomic reflexes. Evacuate your bladder and preferably bowel before the test.  Only the patient is allowed in the procedure room during the testing. If you have someone bring you please let them know they will not be able to come back with you. No lotions or perfumes on the skin  If you have hair on arms, legs, chest or back, for testing purposes the tech may need to shave these  areas. All females please shave legs the day before testing.   Testing last approximately 1 1/2 hrs to 2 hrs.               [address]  [phone]

## 2020-11-18 NOTE — TELEPHONE ENCOUNTER
Received notification   No pa required for ans testing [id number]      Hold prior to testing  norvasc-10 days  zoloft-6 days  Atenolol-2 days  buspar-1 day  Trazodone-3 days

## 2020-11-20 DIAGNOSIS — L03.114 CELLULITIS OF LEFT UPPER EXTREMITY: ICD-10-CM

## 2020-11-20 RX ORDER — CYCLOBENZAPRINE HCL 5 MG
10 TABLET ORAL
Qty: 30 TAB | Refills: 0 | Status: SHIPPED | OUTPATIENT
Start: 2020-11-20 | End: 2020-12-14 | Stop reason: SDUPTHER

## 2020-11-20 RX ORDER — CLOTRIMAZOLE AND BETAMETHASONE DIPROPIONATE 10; .64 MG/G; MG/G
CREAM TOPICAL 2 TIMES DAILY
Qty: 15 G | Refills: 0 | Status: SHIPPED | OUTPATIENT
Start: 2020-11-20 | End: 2021-01-06

## 2020-12-03 ENCOUNTER — OFFICE VISIT (OUTPATIENT)
Dept: FAMILY MEDICINE CLINIC | Age: 45
End: 2020-12-03
Payer: COMMERCIAL

## 2020-12-03 VITALS
WEIGHT: 182.2 LBS | BODY MASS INDEX: 31.1 KG/M2 | DIASTOLIC BLOOD PRESSURE: 69 MMHG | SYSTOLIC BLOOD PRESSURE: 99 MMHG | TEMPERATURE: 97.5 F | OXYGEN SATURATION: 99 % | RESPIRATION RATE: 16 BRPM | HEIGHT: 64 IN | HEART RATE: 98 BPM

## 2020-12-03 DIAGNOSIS — G89.29 CHRONIC BILATERAL LOW BACK PAIN WITHOUT SCIATICA: ICD-10-CM

## 2020-12-03 DIAGNOSIS — G89.29 CHRONIC LOW BACK PAIN WITHOUT SCIATICA, UNSPECIFIED BACK PAIN LATERALITY: ICD-10-CM

## 2020-12-03 DIAGNOSIS — M79.672 PAIN IN BOTH FEET: ICD-10-CM

## 2020-12-03 DIAGNOSIS — F41.9 ANXIETY: ICD-10-CM

## 2020-12-03 DIAGNOSIS — M25.552 HIP PAIN, ACUTE, LEFT: Primary | ICD-10-CM

## 2020-12-03 DIAGNOSIS — M54.50 CHRONIC BILATERAL LOW BACK PAIN WITHOUT SCIATICA: ICD-10-CM

## 2020-12-03 DIAGNOSIS — M54.50 CHRONIC LOW BACK PAIN WITHOUT SCIATICA, UNSPECIFIED BACK PAIN LATERALITY: ICD-10-CM

## 2020-12-03 DIAGNOSIS — M79.671 PAIN IN BOTH FEET: ICD-10-CM

## 2020-12-03 PROCEDURE — 99214 OFFICE O/P EST MOD 30 MIN: CPT | Performed by: FAMILY MEDICINE

## 2020-12-03 RX ORDER — SERTRALINE HYDROCHLORIDE 50 MG/1
TABLET, FILM COATED ORAL
Qty: 90 TAB | Refills: 1 | Status: SHIPPED | OUTPATIENT
Start: 2020-12-03 | End: 2020-12-14 | Stop reason: SDUPTHER

## 2020-12-03 RX ORDER — DICLOFENAC SODIUM 75 MG/1
75 TABLET, DELAYED RELEASE ORAL 2 TIMES DAILY
Qty: 60 TAB | Refills: 5 | Status: SHIPPED | OUTPATIENT
Start: 2020-12-03 | End: 2021-01-06

## 2020-12-03 RX ORDER — TOPIRAMATE 50 MG/1
TABLET, FILM COATED ORAL
COMMUNITY
Start: 2020-11-20 | End: 2021-01-06

## 2020-12-03 NOTE — PROGRESS NOTES
Patient stated name &     Chief Complaint   Patient presents with    Hip Pain     And back pain 1 month follow up    Tried Flexerell    Medication Refill     Zoloft        Health Maintenance Due   Topic    Pneumococcal 0-64 years (1 of 1 - PPSV23)    Eye Exam Retinal or Dilated     DTaP/Tdap/Td series (1 - Tdap)    Flu Vaccine (1)       Wt Readings from Last 3 Encounters:   20 182 lb 3.2 oz (82.6 kg)   20 178 lb (80.7 kg)   10/29/20 178 lb (80.7 kg)     Temp Readings from Last 3 Encounters:   20 97.5 °F (36.4 °C) (Oral)   20 97.3 °F (36.3 °C)   10/26/20 98.6 °F (37 °C) (Oral)     BP Readings from Last 3 Encounters:   20 99/69   20 114/76   10/29/20 103/73     Pulse Readings from Last 3 Encounters:   20 98   20 (!) 115   10/26/20 (!) 103         Learning Assessment:  :     Learning Assessment 10/21/2016 3/12/2014   PRIMARY LEARNER Patient Patient   HIGHEST LEVEL OF EDUCATION - PRIMARY LEARNER  - GRADUATED HIGH SCHOOL OR GED   BARRIERS PRIMARY LEARNER - READING   CO-LEARNER CAREGIVER - Yes   CO-LEARNER NAME - [address]    NEED - No   LEARNER PREFERENCE PRIMARY DEMONSTRATION LISTENING   LEARNING SPECIAL TOPICS - SOB why   ANSWERED BY Patient patient   RELATIONSHIP SELF SELF       Depression Screening:  :     3 most recent PHQ Screens 12/3/2020   Little interest or pleasure in doing things Not at all   Feeling down, depressed, irritable, or hopeless Not at all   Total Score PHQ 2 0       Fall Risk Assessment:  :     No flowsheet data found. Abuse Screening:  :     No flowsheet data found. Coordination of Care Questionnaire:  :     1) Have you been to an emergency room, urgent care clinic since your last visit? No    Hospitalized since your last visit? No             2) Have you seen or consulted any other health care providers outside of [address] [place] [address] since your last visit?  No  (Include any pap smears or colon screenings in this section.)    Patient is accompanied by self I have received verbal consent from Pallavi Scanlon to discuss any/all medical information while they are present in the room.

## 2020-12-03 NOTE — PROGRESS NOTES
Elva Grace is a 50 y.o. female who presents today with the following:    HPI  Chief Complaint   Patient presents with    Hip Pain     And back pain 1 month follow up    Tried Flexerell    Medication Refill     Zoloft       1. Hip pain, acute, left  Has history of left hip bursitis. Has had injection in the left hip in the past at [address] [place] [address]. Reports left hip pain has returned. Requests referral to Ortho today. Declines x-ray. Left hip is tender on palpation. 2. Chronic bilateral low back pain without sciatica  Reports chronic thoracic and low back pain. Has not had x-rays recently. Taking Voltaren 50 twice a day which is not improving. Has also tried Flexeril without improvement. Agrees to physical therapy. Declines x-ray today. I will increase Voltaren dose. Patient to make appointment with Ortho and physical therapy. 3. Anxiety  Stable on Zoloft. Denies suicidal homicidal ideation. Requests refills of Zoloft. 4. Pain in both feet  Patient has been experiencing pain in the plantar surface of both feet. Has been on NSAIDs. Experiences dry skin and was prescribed Lotrisone which has improved erythema. Patient continues to have dry skin and skin breakdown. No signs of infection anymore. Review of Systems   Constitutional: Negative. HENT: Negative. Eyes: Negative. Respiratory: Negative. Cardiovascular: Negative. Gastrointestinal: Negative. Genitourinary: Negative. Musculoskeletal: Positive for back pain, falls, joint pain and myalgias. Skin: Negative. Neurological: Negative. Endo/Heme/Allergies: Negative. Psychiatric/Behavioral: The patient is nervous/anxious. Physical Exam  Vitals signs and nursing note reviewed. HENT:      Head: Normocephalic and atraumatic.       Right Ear: External ear normal.      Left Ear: External ear normal.      Nose: Nose normal.   Eyes:      Conjunctiva/sclera: Conjunctivae normal.   Neck: Musculoskeletal: Normal range of motion and neck supple. Thyroid: No thyromegaly. Cardiovascular:      Rate and Rhythm: Normal rate and regular rhythm. Pulmonary:      Effort: Pulmonary effort is normal.      Breath sounds: Normal breath sounds. Abdominal:      General: Bowel sounds are normal. There is no distension. Palpations: Abdomen is soft. Tenderness: There is no abdominal tenderness. Musculoskeletal: Normal range of motion. Left hip: She exhibits tenderness. Thoracic back: She exhibits pain and spasm. Lumbar back: She exhibits pain and spasm. Feet:      Right foot:      Skin integrity: Skin breakdown and dry skin present. No erythema. Left foot:      Skin integrity: Skin breakdown and dry skin present. No erythema. Lymphadenopathy:      Cervical: No cervical adenopathy. Skin:     General: Skin is warm and dry. Neurological:      Mental Status: She is alert and oriented to person, place, and time. Psychiatric:         Mood and Affect: Mood and affect normal.       BP 99/69   Pulse 98   Temp 97.5 °F (36.4 °C) (Oral)   Resp 16   Ht 5' 4\" (1.626 m)   Wt 182 lb 3.2 oz (82.6 kg)   SpO2 99%   BMI 31.27 kg/m²     Allergies   Allergen Reactions    Morphine Anaphylaxis     July 5, 2016 administered after surgery for pain.  Bacitracin Other (comments)    Doxycycline Hives    Morphine Unknown (comments)    Other Medication Palpitations     Peripheral Block patient noted difficulty breathing and palpitations. Current Outpatient Medications   Medication Sig    topiramate (TOPAMAX) 50 mg tablet     diclofenac EC (VOLTAREN) 75 mg EC tablet Take 1 Tab by mouth two (2) times a day.  sertraline (ZOLOFT) 50 mg tablet TAKE 1 TABLET BY MOUTH DAILY    clotrimazole-betamethasone (LOTRISONE) topical cream Apply  to affected area two (2) times a day.  cyclobenzaprine (FLEXERIL) 5 mg tablet Take 2 Tabs by mouth nightly as needed for Muscle Spasm(s).     pregabalin (LYRICA) 50 mg capsule Take 1 Cap by mouth three (3) times daily. Max Daily Amount: 150 mg. (Patient taking differently: Take 50 mg by mouth three (3) times daily. Indications: NEUROLOGIST PRESCRIBED)    amLODIPine (NORVASC) 10 mg tablet TAKE 1 TABLET BY MOUTH DAILY    progesterone (Prometrium) 200 mg capsule 12 nights per month. (Patient taking differently: 12 nights per month. Indications: GYN PRESCRIBED)    atenoloL (TENORMIN) 50 mg tablet     busPIRone (BUSPAR) 10 mg tablet Take 1 Tab by mouth three (3) times daily. Indications: repeated episodes of anxiety    traZODone (DESYREL) 100 mg tablet Take 2 Tabs by mouth nightly as needed for Sleep.  linaclotide (LINZESS PO) Take  by mouth.  omeprazole (PRILOSEC) 20 mg capsule Take 20 mg by mouth daily as needed (if heartburn persists after first dose). No current facility-administered medications for this visit.         Past Medical History:   Diagnosis Date    Arrhythmia     PAC    Arthritis     Cellulitis     cellulitis    Chest pain     Diabetes ([facility] [address]     GERD (gastroesophageal reflux disease)     H/O seasonal allergies     Headache     Hearing loss 7/10/2020    Ill-defined condition     \"twisted\" kidney right    Lyme disease     Obesity     Plantar fasciitis     PUD (peptic ulcer disease)     Trigger finger, right index finger 2/2016    Vitamin D deficiency        Past Surgical History:   Procedure Laterality Date    HX GASTRIC BYPASS  2015    HX GI      HX HEART CATHETERIZATION  7/28/15    HX ORTHOPAEDIC  7-30-15    Rt. rotator cuff repair     HX TONSILLECTOMY      MT COLSC FLX W/NDSC US XM RCTM ET AL LMTD&ADJ [facility]  2011       Problem List  Date Reviewed: 10/29/2020          Codes Class Noted    Hearing loss ICD-10-CM: H91.90  ICD-9-CM: 389.9  7/10/2020        Migraine without status migrainosus, not intractable ICD-10-CM: L40.164  ICD-9-CM: 346.90  5/20/2020        Anxiety ICD-10-CM: F41.9  ICD-9-CM: 300.00 5/20/2020        Gastroparesis ICD-10-CM: K31.84  ICD-9-CM: 536.3  5/20/2020        Other hyperlipidemia ICD-10-CM: E78.49  ICD-9-CM: 272.4  5/20/2020        Controlled type 2 diabetes mellitus without complication, without long-term current use of insulin ([facility] ICD-10-CM: E11.9  ICD-9-CM: 250.00  5/20/2020        Cellulitis and abscess of neck ICD-10-CM: L03.221, L02.11  ICD-9-CM: 682.1  12/3/2016        Controlled type 2 diabetes mellitus without complication ([facility] MLQ-20-LV: E11.9  ICD-9-CM: 250.00  12/3/2016        Sepsis ([place] [address].) ICD-10-CM: A41.9  ICD-9-CM: 038.9, 995.91  12/3/2016        Elevated pulse rate ICD-10-CM: R00.0  ICD-9-CM: 785.0  8/31/2015        Chest pain with normal coronary angiography ICD-10-CM: R07.9  ICD-9-CM: 786.50  7/28/2015        Morbid obesity with BMI of 45.0-49.9, adult ([facility] ICD-10-CM: E66.01, Z68.42  ICD-9-CM: 278.01, V85.42  7/28/2015        TMJ tenderness ICD-10-CM: Z85.638  ICD-9-CM: 524.62  6/1/2015        Sinusitis ICD-10-CM: J32.9  ICD-9-CM: 473.9  6/1/2015        New daily persistent headache ICD-10-CM: G44.52  ICD-9-CM: 339.42  6/1/2015        Foot pain, bilateral ICD-10-CM: M79.671, M79.672  ICD-9-CM: 729.5  4/8/2015        Unspecified hereditary and idiopathic peripheral neuropathy ICD-10-CM: G60.9  ICD-9-CM: 356.9  4/8/2015        Chronic back pain ICD-10-CM: M54.9, G89.29  ICD-9-CM: 724.5, 338.29  3/13/2014        Obesity ICD-10-CM: E66.9  ICD-9-CM: 278.00  3/13/2014        Lyme disease ICD-10-CM: A69.20  ICD-9-CM: 088.81  3/13/2014        Anxiety ICD-10-CM: F41.9  ICD-9-CM: 300.00  3/13/2014        Diverticular disease ICD-10-CM: K57.90  ICD-9-CM: 562.10  3/13/2014        GERD (gastroesophageal reflux disease) ICD-10-CM: K21.9  ICD-9-CM: 530.81  3/13/2014        SOB (shortness of breath) ICD-10-CM: R06.02  ICD-9-CM: 786.05  3/12/2014               No results found for this visit on 12/03/20.      1. Hip pain, acute, left    - REFERRAL TO ORTHOPEDICS  - REFERRAL TO PHYSICAL THERAPY    2. Chronic bilateral low back pain without sciatica    - REFERRAL TO ORTHOPEDICS  - REFERRAL TO PHYSICAL THERAPY    3. Chronic low back pain without sciatica, unspecified back pain laterality    - diclofenac EC (VOLTAREN) 75 mg EC tablet; Take 1 Tab by mouth two (2) times a day. Dispense: 60 Tab; Refill: 5    4. Anxiety    - sertraline (ZOLOFT) 50 mg tablet; TAKE 1 TABLET BY MOUTH DAILY  Dispense: 90 Tab; Refill: 1    5. Pain in both feet    - REFERRAL TO PODIATRY          Follow-up and Dispositions    · Return in about 3 months (around 3/3/2021) for follow up. I ADVISED PATIENT TO GO TO ER IF SYMPTOMS WORSEN , CHANGE OR FAILS TO IMPROVE. I have discussed the diagnosis with the patient and the intended plan as seen in the above orders. The patient has received an after-visit summary and questions were answered concerning future plans. I have discussed medication side effects and warnings with the patient as well. The patient agrees and understands above plan.            Montana López MD

## 2020-12-05 ENCOUNTER — HOSPITAL ENCOUNTER (OUTPATIENT)
Dept: PREADMISSION TESTING | Age: 45
Discharge: HOME | End: 2020-12-05

## 2020-12-07 NOTE — PERIOP NOTES
Spoke to [facility] Melquiades GRAYSON at Dr. Ladi Phelps office regarding patient not showing up for covid screening on 12/5.  Patient has been rescheduled for 1/20/21

## 2020-12-11 ENCOUNTER — VIRTUAL VISIT (OUTPATIENT)
Dept: FAMILY MEDICINE CLINIC | Age: 45
End: 2020-12-11
Payer: COMMERCIAL

## 2020-12-11 DIAGNOSIS — R09.89 SINUS COMPLAINT: Primary | ICD-10-CM

## 2020-12-11 PROCEDURE — 99213 OFFICE O/P EST LOW 20 MIN: CPT | Performed by: NURSE PRACTITIONER

## 2020-12-11 RX ORDER — AMOXICILLIN AND CLAVULANATE POTASSIUM 875; 125 MG/1; MG/1
1 TABLET, FILM COATED ORAL EVERY 12 HOURS
Qty: 20 TAB | Refills: 0 | Status: SHIPPED | OUTPATIENT
Start: 2020-12-11 | End: 2020-12-21

## 2020-12-11 NOTE — PROGRESS NOTES
Bryson Castro is a 50 y.o. female who was seen by synchronous (real-time) audio-video technology on 12/11/2020 for Cough and Nasal Congestion        Assessment & Plan:   Diagnoses and all orders for this visit:    1. Sinus complaint    Other orders  -     amoxicillin-clavulanate (AUGMENTIN) 875-125 mg per tablet; Take 1 Tab by mouth every twelve (12) hours for 10 days. Indications: acute bacterial infection of the sinuses    Patient seen today by virtual visit. Patient reports a 2-week history of nasal congestion with green discharge. Patient does report frontal and maxillary facial pain. Patient denies any fever patient denies any contact with any known Covid. Patient has not been using any over-the-counter medications. Patient reports a cough and denies any shortness of breath. I have prescribed Augmentin for the patient and have recommended Flonase and Mucinex daily until symptoms resolve. Patient will follow-up as needed. I spent at least 15 minutes on this visit with this established patient. Subjective:       Prior to Admission medications    Medication Sig Start Date End Date Taking? Authorizing Provider   topiramate (TOPAMAX) 50 mg tablet  11/20/20   Provider, Maldonado   diclofenac EC (VOLTAREN) 75 mg EC tablet Take 1 Tab by mouth two (2) times a day. 12/3/20   Jorge Caraballo MD   sertraline (ZOLOFT) 50 mg tablet TAKE 1 TABLET BY MOUTH DAILY 12/3/20   Jorge Caraballo MD   clotrimazole-betamethasone (LOTRISONE) topical cream Apply  to affected area two (2) times a day. 11/20/20   Sharmin Zhong MD   cyclobenzaprine (FLEXERIL) 5 mg tablet Take 2 Tabs by mouth nightly as needed for Muscle Spasm(s). 11/20/20   Sharmin Zhong MD   pregabalin (LYRICA) 50 mg capsule Take 1 Cap by mouth three (3) times daily. Max Daily Amount: 150 mg. Patient taking differently: Take 50 mg by mouth three (3) times daily.  Indications: NEUROLOGIST PRESCRIBED 11/12/20   Melissa Lopez NP   amLODIPine ([address]) 10 mg tablet TAKE 1 TABLET BY MOUTH DAILY 11/12/20   Skye Chapa MD   progesterone (Prometrium) 200 mg capsule 12 nights per month. Patient taking differently: 12 nights per month. Indications: GYN PRESCRIBED 10/29/20   Joe Calixto MD   atenoloL (TENORMIN) 50 mg tablet  5/21/20   Provider, Historical   busPIRone (BUSPAR) 10 mg tablet Take 1 Tab by mouth three (3) times daily. Indications: repeated episodes of anxiety 7/8/20   Skye Chapa MD   traZODone (DESYREL) 100 mg tablet Take 2 Tabs by mouth nightly as needed for Sleep. 7/8/20   Skye Chapa MD   linaclotide (LINZESS PO) Take  by mouth. Provider, Historical   omeprazole (PRILOSEC) 20 mg capsule Take 20 mg by mouth daily as needed (if heartburn persists after first dose).     Provider, Historical     Patient Active Problem List   Diagnosis Code    SOB (shortness of breath) R06.02    Chronic back pain M54.9, G89.29    Obesity E66.9    Lyme disease A69.20    Anxiety F41.9    Diverticular disease K57.90    GERD (gastroesophageal reflux disease) K21.9    Foot pain, bilateral M79.671, M79.672    Unspecified hereditary and idiopathic peripheral neuropathy G60.9    TMJ tenderness M26.629    Sinusitis J32.9    New daily persistent headache G44.52    Chest pain with normal coronary angiography R07.9    Morbid obesity with BMI of 45.0-49.9, adult ([facility]) E66.01, Z68.42    Elevated pulse rate R00.0    Cellulitis and abscess of neck L03.221, L02.11    Controlled type 2 diabetes mellitus without complication ([facility]) W42.4    Sepsis ([facility]) A41.9    Migraine without status migrainosus, not intractable G43.909    Anxiety F41.9    Gastroparesis K31.84    Other hyperlipidemia E78.49    Controlled type 2 diabetes mellitus without complication, without long-term current use of insulin ([facility]) E11.9    Hearing loss H91.90     Patient Active Problem List    Diagnosis Date Noted    Hearing loss 07/10/2020    Migraine without status migrainosus, not intractable 05/20/2020    Anxiety 05/20/2020    Gastroparesis 05/20/2020    Other hyperlipidemia 05/20/2020    Controlled type 2 diabetes mellitus without complication, without long-term current use of insulin ([facility] 05/20/2020    Cellulitis and abscess of neck 12/03/2016    Controlled type 2 diabetes mellitus without complication ([facility] 54/30/8057    Sepsis [facility] 12/03/2016    Elevated pulse rate 08/31/2015    Chest pain with normal coronary angiography 07/28/2015    Morbid obesity with BMI of 45.0-49.9, adult ([facility] 07/28/2015    TMJ tenderness 06/01/2015    Sinusitis 06/01/2015    New daily persistent headache 06/01/2015    Foot pain, bilateral 04/08/2015    Unspecified hereditary and idiopathic peripheral neuropathy 04/08/2015    Chronic back pain 03/13/2014    Obesity 03/13/2014    Lyme disease 03/13/2014    Anxiety 03/13/2014    Diverticular disease 03/13/2014    GERD (gastroesophageal reflux disease) 03/13/2014    SOB (shortness of breath) 03/12/2014     Current Outpatient Medications   Medication Sig Dispense Refill    topiramate (TOPAMAX) 50 mg tablet       diclofenac EC (VOLTAREN) 75 mg EC tablet Take 1 Tab by mouth two (2) times a day. 60 Tab 5    sertraline (ZOLOFT) 50 mg tablet TAKE 1 TABLET BY MOUTH DAILY 90 Tab 1    clotrimazole-betamethasone (LOTRISONE) topical cream Apply  to affected area two (2) times a day. 15 g 0    cyclobenzaprine (FLEXERIL) 5 mg tablet Take 2 Tabs by mouth nightly as needed for Muscle Spasm(s). 30 Tab 0    pregabalin (LYRICA) 50 mg capsule Take 1 Cap by mouth three (3) times daily. Max Daily Amount: 150 mg. (Patient taking differently: Take 50 mg by mouth three (3) times daily. Indications: NEUROLOGIST PRESCRIBED) 90 Cap 1    amLODIPine (NORVASC) 10 mg tablet TAKE 1 TABLET BY MOUTH DAILY 90 Tab 1    progesterone (Prometrium) 200 mg capsule 12 nights per month. (Patient taking differently: 12 nights per month.   Indications: GYN PRESCRIBED) 36 Cap 2    atenoloL (TENORMIN) 50 mg tablet       busPIRone (BUSPAR) 10 mg tablet Take 1 Tab by mouth three (3) times daily. Indications: repeated episodes of anxiety 90 Tab 5    traZODone (DESYREL) 100 mg tablet Take 2 Tabs by mouth nightly as needed for Sleep. 60 Tab 5    linaclotide (LINZESS PO) Take  by mouth.  omeprazole (PRILOSEC) 20 mg capsule Take 20 mg by mouth daily as needed (if heartburn persists after first dose). Allergies   Allergen Reactions    Morphine Anaphylaxis     July 5, 2016 administered after surgery for pain.  Bacitracin Other (comments)    Doxycycline Hives    Morphine Unknown (comments)    Other Medication Palpitations     Peripheral Block patient noted difficulty breathing and palpitations. Past Medical History:   Diagnosis Date    Arrhythmia     PAC    Arthritis     Cellulitis     cellulitis    Chest pain     Diabetes ([facility]     GERD (gastroesophageal reflux disease)     H/O seasonal allergies     Headache     Hearing loss 7/10/2020    Ill-defined condition     \"twisted\" kidney right    Lyme disease     Obesity     Plantar fasciitis     PUD (peptic ulcer disease)     Trigger finger, right index finger 2/2016    Vitamin D deficiency      Past Surgical History:   Procedure Laterality Date    HX GASTRIC BYPASS  2015    HX GI      HX HEART CATHETERIZATION  7/28/15    HX ORTHOPAEDIC  7-30-15    Rt. rotator cuff repair     HX TONSILLECTOMY      NV COLSC FLX W/NDSC US XM RCTM ET AL LMTD&ADJ [address]  2011       Review of Systems   Constitutional: Negative. HENT: Positive for congestion and sinus pain. Eyes: Negative. Respiratory: Positive for cough. Cardiovascular: Negative. Gastrointestinal: Negative. Genitourinary: Negative. Musculoskeletal: Negative. Skin: Negative. Neurological: Negative. Endo/Heme/Allergies: Negative. Psychiatric/Behavioral: Negative.         Objective:     Patient-Reported Vitals 10/15/2020 Patient-Reported Weight 175lb   Patient-Reported Height -        [INSTRUCTIONS:  \"[x]\" Indicates a positive item  \"[]\" Indicates a negative item  -- DELETE ALL ITEMS NOT EXAMINED]    Constitutional: [x] Appears well-developed and well-nourished [x] No apparent distress      [] Abnormal -     Mental status: [x] Alert and awake  [x] Oriented to person/place/time [x] Able to follow commands    [] Abnormal -     Eyes:   EOM    [x]  Normal    [] Abnormal -   Sclera  [x]  Normal    [] Abnormal -          Discharge [x]  None visible   [] Abnormal -     HENT: [x] Normocephalic, atraumatic  [] Abnormal -   [x] Mouth/Throat: Mucous membranes are moist    External Ears [x] Normal  [] Abnormal -    Neck: [x] No visualized mass [] Abnormal -     Pulmonary/Chest: [x] Respiratory effort normal   [x] No visualized signs of difficulty breathing or respiratory distress        [] Abnormal -      Musculoskeletal:   [x] Normal gait with no signs of ataxia         [x] Normal range of motion of neck        [] Abnormal -     Neurological:        [x] No Facial Asymmetry (Cranial nerve 7 motor function) (limited exam due to video visit)          [x] No gaze palsy        [] Abnormal -          Skin:        [x] No significant exanthematous lesions or discoloration noted on facial skin         [] Abnormal -            Psychiatric:       [x] Normal Affect [] Abnormal -        [x] No Hallucinations    Other pertinent observable physical exam findings:-        We discussed the expected course, resolution and complications of the diagnosis(es) in detail. Medication risks, benefits, costs, interactions, and alternatives were discussed as indicated. I advised her to contact the office if her condition worsens, changes or fails to improve as anticipated. She expressed understanding with the diagnosis(es) and plan.        Raymond De Jesus, who was evaluated through a patient-initiated, synchronous (real-time) audio-video encounter, and/or her healthcare decision maker, is aware that it is a billable service, with coverage as determined by her insurance carrier. She provided verbal consent to proceed: Yes, and patient identification was verified. It was conducted pursuant to the emergency declaration under the [address] [place] [address], [address] [place] [address] authority and the [employer] Act. A caregiver was present when appropriate. Ability to conduct physical exam was limited. I was at home. The patient was at home.       Yessenia Manriquez NP

## 2020-12-14 ENCOUNTER — NURSE TRIAGE (OUTPATIENT)
Dept: OTHER | Facility: CLINIC | Age: 45
End: 2020-12-14

## 2020-12-14 DIAGNOSIS — F41.9 ANXIETY: ICD-10-CM

## 2020-12-14 DIAGNOSIS — I10 ESSENTIAL HYPERTENSION: ICD-10-CM

## 2020-12-14 RX ORDER — SERTRALINE HYDROCHLORIDE 50 MG/1
TABLET, FILM COATED ORAL
Qty: 90 TAB | Refills: 1 | Status: SHIPPED | OUTPATIENT
Start: 2020-12-14 | End: 2020-12-16 | Stop reason: SDUPTHER

## 2020-12-14 RX ORDER — AMLODIPINE BESYLATE 10 MG/1
TABLET ORAL
Qty: 90 TAB | Refills: 1 | Status: SHIPPED | OUTPATIENT
Start: 2020-12-14 | End: 2021-11-10

## 2020-12-14 RX ORDER — CYCLOBENZAPRINE HCL 5 MG
10 TABLET ORAL
Qty: 30 TAB | Refills: 0 | Status: SHIPPED | OUTPATIENT
Start: 2020-12-14 | End: 2021-01-06

## 2020-12-14 NOTE — TELEPHONE ENCOUNTER
Received hard transfer call from  at [address] [place] [address]. Patient called in c/o a moderate dry cough, onset about week ago, see triage assessment below. Care advice provided; patient acknowledged understanding of care advice and is in agreement with plan. Call Soft transferred to Rain Allen to assist in scheduling appointment. Reason for Disposition   Continuous (nonstop) coughing interferes with work or school and no improvement using cough treatment per Care Advice    Answer Assessment - Initial Assessment Questions  1. ONSET: \"When did the cough begin? \"       12/7/20    2. SEVERITY: \"How bad is the cough today? \"      Moderate    3. RESPIRATORY DISTRESS: \"Describe your breathing. \"       Normal    4. FEVER: \"Do you have a fever? \" If so, ask: \"What is your temperature, how was it measured, and when did it start? \"      Denies    5. HEMOPTYSIS: \"Are you coughing up any blood? \" If so ask: \"How much? \" (flecks, streaks, tablespoons, etc.)      Denies    6. TREATMENT: \"What have you done so far to treat the cough? \" (e.g., meds, fluids, humidifier)      Cough drops and other stuff, drinking water    7. CARDIAC HISTORY: \"Do you have any history of heart disease? \" (e.g., heart attack, congestive heart failure)       Denies    8. LUNG HISTORY: \"Do you have any history of lung disease? \"  (e.g., pulmonary embolus, asthma, emphysema)      Denies    9. PE RISK FACTORS: \"Do you have a history of blood clots? \" (or: recent major surgery, recent prolonged travel, bedridden)     Denies    10. OTHER SYMPTOMS: \"Do you have any other symptoms? (e.g., runny nose, wheezing, chest pain)        Denies    11. PREGNANCY: \"Is there any chance you are pregnant? \" \"When was your last menstrual period? \"        No. LMP - irregular periods, currently be treated by OB/GYN    12. TRAVEL: \"Have you traveled out of the country in the last month? \" (e.g., travel history, exposures)        Denies travel    Protocols used: COUGH-ADULT-[place]    Attention Provider: Thank you for allowing me to participate in the care of your patient. The  patient was connected to triage in response to information provided to the [facility]. Please do not respond through this encounter as the response is not directed to a shared pool.

## 2020-12-14 NOTE — TELEPHONE ENCOUNTER
----- Message from [employer] sent at 12/14/2020 10:52 AM EST -----  Regarding: Tricia/telephone  Pt is requesting a Rx for a cough that started about a week ago. Pts number is [phone] and [facility] pharmacy [phone]. She is requesting a Rx for Flexeril ,Zoloft and Amlodipine

## 2020-12-16 ENCOUNTER — VIRTUAL VISIT (OUTPATIENT)
Dept: FAMILY MEDICINE CLINIC | Age: 45
End: 2020-12-16
Payer: COMMERCIAL

## 2020-12-16 DIAGNOSIS — R11.2 NON-INTRACTABLE VOMITING WITH NAUSEA, UNSPECIFIED VOMITING TYPE: ICD-10-CM

## 2020-12-16 DIAGNOSIS — R10.12 ABDOMINAL PAIN, LEFT UPPER QUADRANT: ICD-10-CM

## 2020-12-16 DIAGNOSIS — R74.01 ELEVATED AST (SGOT): ICD-10-CM

## 2020-12-16 DIAGNOSIS — J01.10 ACUTE NON-RECURRENT FRONTAL SINUSITIS: Primary | ICD-10-CM

## 2020-12-16 DIAGNOSIS — E87.6 HYPOKALEMIA: ICD-10-CM

## 2020-12-16 DIAGNOSIS — F41.9 ANXIETY: ICD-10-CM

## 2020-12-16 PROCEDURE — 99213 OFFICE O/P EST LOW 20 MIN: CPT | Performed by: FAMILY MEDICINE

## 2020-12-16 RX ORDER — AZITHROMYCIN 250 MG/1
TABLET, FILM COATED ORAL
Qty: 6 TAB | Refills: 0 | Status: SHIPPED | OUTPATIENT
Start: 2020-12-16 | End: 2020-12-21

## 2020-12-16 RX ORDER — BENZONATATE 100 MG/1
100 CAPSULE ORAL
Qty: 21 CAP | Refills: 0 | Status: SHIPPED | OUTPATIENT
Start: 2020-12-16 | End: 2020-12-23

## 2020-12-16 RX ORDER — METHYLPREDNISOLONE 4 MG/1
TABLET ORAL
Qty: 1 DOSE PACK | Refills: 0 | Status: SHIPPED | OUTPATIENT
Start: 2020-12-16 | End: 2021-01-06

## 2020-12-16 RX ORDER — SERTRALINE HYDROCHLORIDE 50 MG/1
TABLET, FILM COATED ORAL
Qty: 90 TAB | Refills: 1 | Status: SHIPPED | OUTPATIENT
Start: 2020-12-16 | End: 2020-12-21

## 2020-12-16 NOTE — PROGRESS NOTES
Consent: Severo Valenzuela, who was seen by synchronous (real-time) audio-video technology, and/or her healthcare decision maker, is aware that this patient-initiated, Telehealth encounter on 12/16/2020 is a billable service, with coverage as determined by her insurance carrier. She is aware that she may receive a bill and has provided verbal consent to proceed: Yes. Assessment & Plan:   Diagnoses and all orders for this visit:    1. Acute non-recurrent frontal sinusitis  -     methylPREDNISolone (MEDROL DOSEPACK) 4 mg tablet; Take as directed on the pack  -     azithromycin (ZITHROMAX) 250 mg tablet; Take 2 tablets today, then take 1 tablet daily  -     benzonatate (TESSALON) 100 mg capsule; Take 1 Cap by mouth three (3) times daily as needed for Cough for up to 7 days. 2. Anxiety  -     sertraline (ZOLOFT) 50 mg tablet; TAKE 1 TABLET BY MOUTH DAILY          Follow-up and Dispositions    · Return if symptoms worsen or fail to improve. I ADVISED PATIENT TO GO TO ER IF SYMPTOMS WORSEN , CHANGE OR FAILS TO IMPROVE. I spent at least 15 minutes with this established patient, and >50% of the time was spent counseling and/or coordinating care regarding SEE BELOW  [id number]  Subjective:   Severo Valenzuela is a 50 y.o. female who was seen for Cough (Started about 2 weeks ago    No fever, no body aches, no sore throat, no headache, no abd pain     Tried cough drops)      1. Anxiety  Requests refills of Zoloft. No side effects. Denies suicidal homicidal ideation. 2. Acute non-recurrent frontal sinusitis  Patient reports dry cough for last 2 weeks. Associate symptoms include sinus pain, shortness of breath, wheezing, sinus congestion, nasal congestion and discharge which is green and yellow in color. Denies fever, sore throat, body aches, fatigue, headaches. Has been using albuterol inhaler without relief. Tried cough drops without relief.   I discussed with patient to get COVID-19 testing done if symptoms worsen or fail to improve. Prior to Admission medications    Medication Sig Start Date End Date Taking? Authorizing Provider   sertraline (ZOLOFT) 50 mg tablet TAKE 1 TABLET BY MOUTH DAILY 12/16/20  Yes Jayce Wilks MD   methylPREDNISolone (MEDROL DOSEPACK) 4 mg tablet Take as directed on the pack 12/16/20  Yes Jayce Wilks MD   azithromycin (ZITHROMAX) 250 mg tablet Take 2 tablets today, then take 1 tablet daily 12/16/20 12/21/20 Yes Jayce Wilks MD   benzonatate (TESSALON) 100 mg capsule Take 1 Cap by mouth three (3) times daily as needed for Cough for up to 7 days. 12/16/20 12/23/20 Yes Jayce Wilks MD   cyclobenzaprine (FLEXERIL) 5 mg tablet Take 2 Tabs by mouth nightly as needed for Muscle Spasm(s). 12/14/20   Jayce Wilks MD   amLODIPine (NORVASC) 10 mg tablet TAKE 1 TABLET BY MOUTH DAILY 12/14/20   Jayce Wilks MD   sertraline (ZOLOFT) 50 mg tablet TAKE 1 TABLET BY MOUTH DAILY 12/14/20 12/16/20  Jayce Wilks MD   amoxicillin-clavulanate (AUGMENTIN) 875-125 mg per tablet Take 1 Tab by mouth every twelve (12) hours for 10 days. Indications: acute bacterial infection of the sinuses 12/11/20 12/21/20  Oj Fernández NP   topiramate (TOPAMAX) 50 mg tablet  11/20/20   Provider, Maldonado   diclofenac EC (VOLTAREN) 75 mg EC tablet Take 1 Tab by mouth two (2) times a day. 12/3/20   Jayce Wilks MD   clotrimazole-betamethasone (LOTRISONE) topical cream Apply  to affected area two (2) times a day. 11/20/20   Neville Zhong MD   pregabalin (LYRICA) 50 mg capsule Take 1 Cap by mouth three (3) times daily. Max Daily Amount: 150 mg. Patient taking differently: Take 50 mg by mouth three (3) times daily. Indications: NEUROLOGIST PRESCRIBED 11/12/20   Sara PENA NP   progesterone (Prometrium) 200 mg capsule 12 nights per month. Patient taking differently: 12 nights per month.   Indications: GYN PRESCRIBED 10/29/20   Radhames Ocampo MD   atenoloL (TENORMIN) 50 mg tablet  5/21/20 Provider, Historical   busPIRone (BUSPAR) 10 mg tablet Take 1 Tab by mouth three (3) times daily. Indications: repeated episodes of anxiety 7/8/20   Hector Post, MD   traZODone (DESYREL) 100 mg tablet Take 2 Tabs by mouth nightly as needed for Sleep. 7/8/20   Hector Post, MD   linaclotide (LINZESS PO) Take  by mouth. Provider, Historical   omeprazole (PRILOSEC) 20 mg capsule Take 20 mg by mouth daily as needed (if heartburn persists after first dose). Provider, Historical     Allergies   Allergen Reactions    Morphine Anaphylaxis     July 5, 2016 administered after surgery for pain.  Bacitracin Other (comments)    Doxycycline Hives    Morphine Unknown (comments)    Other Medication Palpitations     Peripheral Block patient noted difficulty breathing and palpitations.        Patient Active Problem List   Diagnosis Code    SOB (shortness of breath) R06.02    Chronic back pain M54.9, G89.29    Obesity E66.9    Lyme disease A69.20    Anxiety F41.9    Diverticular disease K57.90    GERD (gastroesophageal reflux disease) K21.9    Foot pain, bilateral M79.671, M79.672    Unspecified hereditary and idiopathic peripheral neuropathy G60.9    TMJ tenderness M26.629    Sinusitis J32.9    New daily persistent headache G44.52    Chest pain with normal coronary angiography R07.9    Morbid obesity with BMI of 45.0-49.9, adult ([facility]) E66.01, Z68.42    Elevated pulse rate R00.0    Cellulitis and abscess of neck L03.221, L02.11    Controlled type 2 diabetes mellitus without complication ([facility]) D04.1    Sepsis ([facility]) A41.9    Migraine without status migrainosus, not intractable G43.909    Anxiety F41.9    Gastroparesis K31.84    Other hyperlipidemia E78.49    Controlled type 2 diabetes mellitus without complication, without long-term current use of insulin ([facility]) E11.9    Hearing loss H91.90     Patient Active Problem List    Diagnosis Date Noted    Hearing loss 07/10/2020    Migraine without status migrainosus, not intractable 05/20/2020    Anxiety 05/20/2020    Gastroparesis 05/20/2020    Other hyperlipidemia 05/20/2020    Controlled type 2 diabetes mellitus without complication, without long-term current use of insulin ([facility] 05/20/2020    Cellulitis and abscess of neck 12/03/2016    Controlled type 2 diabetes mellitus without complication ([facility] 67/87/5409    Sepsis ([facility] 12/03/2016    Elevated pulse rate 08/31/2015    Chest pain with normal coronary angiography 07/28/2015    Morbid obesity with BMI of 45.0-49.9, adult ([facility] 07/28/2015    TMJ tenderness 06/01/2015    Sinusitis 06/01/2015    New daily persistent headache 06/01/2015    Foot pain, bilateral 04/08/2015    Unspecified hereditary and idiopathic peripheral neuropathy 04/08/2015    Chronic back pain 03/13/2014    Obesity 03/13/2014    Lyme disease 03/13/2014    Anxiety 03/13/2014    Diverticular disease 03/13/2014    GERD (gastroesophageal reflux disease) 03/13/2014    SOB (shortness of breath) 03/12/2014     Current Outpatient Medications   Medication Sig Dispense Refill    sertraline (ZOLOFT) 50 mg tablet TAKE 1 TABLET BY MOUTH DAILY 90 Tab 1    methylPREDNISolone (MEDROL DOSEPACK) 4 mg tablet Take as directed on the pack 1 Dose Pack 0    azithromycin (ZITHROMAX) 250 mg tablet Take 2 tablets today, then take 1 tablet daily 6 Tab 0    benzonatate (TESSALON) 100 mg capsule Take 1 Cap by mouth three (3) times daily as needed for Cough for up to 7 days. 21 Cap 0    cyclobenzaprine (FLEXERIL) 5 mg tablet Take 2 Tabs by mouth nightly as needed for Muscle Spasm(s). 30 Tab 0    amLODIPine (NORVASC) 10 mg tablet TAKE 1 TABLET BY MOUTH DAILY 90 Tab 1    amoxicillin-clavulanate (AUGMENTIN) 875-125 mg per tablet Take 1 Tab by mouth every twelve (12) hours for 10 days.  Indications: acute bacterial infection of the sinuses 20 Tab 0    topiramate (TOPAMAX) 50 mg tablet       diclofenac EC (VOLTAREN) 75 mg EC tablet Take 1 Tab by mouth two (2) times a day. 60 Tab 5    clotrimazole-betamethasone (LOTRISONE) topical cream Apply  to affected area two (2) times a day. 15 g 0    pregabalin (LYRICA) 50 mg capsule Take 1 Cap by mouth three (3) times daily. Max Daily Amount: 150 mg. (Patient taking differently: Take 50 mg by mouth three (3) times daily. Indications: NEUROLOGIST PRESCRIBED) 90 Cap 1    progesterone (Prometrium) 200 mg capsule 12 nights per month. (Patient taking differently: 12 nights per month. Indications: GYN PRESCRIBED) 36 Cap 2    atenoloL (TENORMIN) 50 mg tablet       busPIRone (BUSPAR) 10 mg tablet Take 1 Tab by mouth three (3) times daily. Indications: repeated episodes of anxiety 90 Tab 5    traZODone (DESYREL) 100 mg tablet Take 2 Tabs by mouth nightly as needed for Sleep. 60 Tab 5    linaclotide (LINZESS PO) Take  by mouth.  omeprazole (PRILOSEC) 20 mg capsule Take 20 mg by mouth daily as needed (if heartburn persists after first dose). Allergies   Allergen Reactions    Morphine Anaphylaxis     July 5, 2016 administered after surgery for pain.  Bacitracin Other (comments)    Doxycycline Hives    Morphine Unknown (comments)    Other Medication Palpitations     Peripheral Block patient noted difficulty breathing and palpitations.      Past Medical History:   Diagnosis Date    Arrhythmia     PAC    Arthritis     Cellulitis     cellulitis    Chest pain     Diabetes ([address]     GERD (gastroesophageal reflux disease)     H/O seasonal allergies     Headache     Hearing loss 7/10/2020    Ill-defined condition     \"twisted\" kidney right    Lyme disease     Obesity     Plantar fasciitis     PUD (peptic ulcer disease)     Trigger finger, right index finger 2/2016    Vitamin D deficiency      Past Surgical History:   Procedure Laterality Date    HX GASTRIC BYPASS  2015    HX GI      HX HEART CATHETERIZATION  7/28/15    HX ORTHOPAEDIC  7-30-15    Rt. rotator cuff repair     HX TONSILLECTOMY      SD COLSC FLX W/NDSC US XM RCTM ET AL LMTD&ADJ STRUX  2011     Family History   Problem Relation Age of Onset    Cancer Paternal Aunt         colon polyps    Cancer Paternal Uncle         colon cancer    Stroke Mother     Heart Disease Mother     Elevated Lipids Mother     Cancer Father         prostate    Heart Disease Father         death by MI    Coronary Artery Disease Other         mother  64, father age 67 of MI    Other Brother         Passed while sleeping at 64, neck blockages and legs, hx of vascular surg    Hypertension Brother     Diabetes Brother     Anesth Problems Neg Hx      Social History     Tobacco Use    Smoking status: Current Some Day Smoker     Packs/day: 0.50    Smokeless tobacco: Never Used   Substance Use Topics    Alcohol use: Not Currently     Comment: rare       Review of Systems   Constitutional: Negative. HENT: Positive for congestion and sinus pain. Eyes: Negative. Respiratory: Positive for cough, sputum production, shortness of breath and wheezing. Cardiovascular: Negative. Gastrointestinal: Negative. Genitourinary: Negative. Musculoskeletal: Negative. Skin: Negative. Neurological: Negative. Endo/Heme/Allergies: Negative. Psychiatric/Behavioral: Negative.             Objective:     Patient-Reported Vitals 10/15/2020   Patient-Reported Weight 175lb   Patient-Reported Height -        [INSTRUCTIONS:  \"[x]\" Indicates a positive item  \"[]\" Indicates a negative item  -- DELETE ALL ITEMS NOT EXAMINED]    Constitutional: [x] Appears well-developed and well-nourished [x] No apparent distress      [] Abnormal -     Mental status: [x] Alert and awake  [x] Oriented to person/place/time [x] Able to follow commands    [] Abnormal -     Eyes:   EOM    [x]  Normal    [] Abnormal -   Sclera  [x]  Normal    [] Abnormal -          Discharge [x]  None visible   [] Abnormal -     HENT: [x] Normocephalic, atraumatic  [] Abnormal -   [x] Mouth/Throat: Mucous membranes are moist    External Ears [x] Normal  [] Abnormal -    Neck: [x] No visualized mass [] Abnormal -     Pulmonary/Chest: [x] Respiratory effort normal   [x] No visualized signs of difficulty breathing or respiratory distress        [] Abnormal -      Musculoskeletal:   [x] Normal gait with no signs of ataxia         [x] Normal range of motion of neck        [] Abnormal -     Neurological:        [x] No Facial Asymmetry (Cranial nerve 7 motor function) (limited exam due to video visit)          [x] No gaze palsy        [] Abnormal -          Skin:        [x] No significant exanthematous lesions or discoloration noted on facial skin         [] Abnormal -            Psychiatric:       [x] Normal Affect [] Abnormal -        [x] No Hallucinations    Other pertinent observable physical exam findings:-    We discussed the expected course, resolution and complications of the diagnosis(es) in detail. Medication risks, benefits, costs, interactions, and alternatives were discussed as indicated. I advised her to contact the office if her condition worsens, changes or fails to improve as anticipated. She expressed understanding with the diagnosis(es) and plan. Viry Henson is a 50 y.o. female being evaluated by a video visit encounter for concerns as above. A caregiver was present when appropriate. Due to this being a TeleHealth encounter (During FEXFV-95 public health emergency), evaluation of the following organ systems was limited: Vitals/Constitutional/EENT/Resp/CV/GI//MS/Neuro/Skin/Heme-Lymph-Imm. Pursuant to the emergency declaration under the [facility], [facility] waiver authority and the [employer] and [employer] Act, this Virtual  Visit was conducted, with patient's (and/or legal guardian's) consent, to reduce the patient's risk of exposure to COVID-19 and provide necessary medical care.      Services were provided through a video synchronous discussion virtually to substitute for in-person clinic visit. Patient was located at her home. I was at office while conducting this encounter.        Candy Michelle MD

## 2020-12-16 NOTE — PROGRESS NOTES
Patient stated name &   Chief Complaint   Patient presents with    Cough     Started about 2 weeks ago    No fever, no body aches, no sore throat, no headache, no abd pain     Tried cough drops        Health Maintenance Due   Topic    Pneumococcal 0-64 years (1 of 1 - PPSV23)    Eye Exam Retinal or Dilated     DTaP/Tdap/Td series (1 - Tdap)    Flu Vaccine (1)       Wt Readings from Last 3 Encounters:   20 182 lb 3.2 oz (82.6 kg)   20 178 lb (80.7 kg)   10/29/20 178 lb (80.7 kg)     Temp Readings from Last 3 Encounters:   20 97.5 °F (36.4 °C) (Oral)   20 97.3 °F (36.3 °C)   10/26/20 98.6 °F (37 °C) (Oral)     BP Readings from Last 3 Encounters:   20 99/69   20 114/76   10/29/20 103/73     Pulse Readings from Last 3 Encounters:   20 98   20 (!) 115   10/26/20 (!) 103         Learning Assessment:  :     Learning Assessment 10/21/2016 3/12/2014   PRIMARY LEARNER Patient Patient   HIGHEST LEVEL OF EDUCATION - PRIMARY LEARNER  - GRADUATED HIGH SCHOOL OR GED   BARRIERS PRIMARY LEARNER - READING   CO-LEARNER CAREGIVER - Yes   CO-LEARNER NAME - [address]    NEED - No   LEARNER PREFERENCE PRIMARY DEMONSTRATION LISTENING   LEARNING SPECIAL TOPICS - SOB why   ANSWERED BY Patient patient   RELATIONSHIP SELF SELF       Depression Screening:  :     3 most recent PHQ Screens 2020   Little interest or pleasure in doing things Not at all   Feeling down, depressed, irritable, or hopeless Several days   Total Score PHQ 2 1       Fall Risk Assessment:  :     No flowsheet data found. Abuse Screening:  :     No flowsheet data found. Coordination of Care Questionnaire:  :     1) Have you been to an emergency room, urgent care clinic since your last visit? No    Hospitalized since your last visit?  No             2) Have you seen or consulted any other health care providers outside of [address] [place] [address] since your last visit? No  (Include any pap smears or colon screenings in this section.)    Patient is accompanied by VV I have received verbal consent from Chapo Graves to discuss any/all medical information while they are present in the room.

## 2020-12-18 DIAGNOSIS — F41.9 ANXIETY: ICD-10-CM

## 2020-12-21 RX ORDER — SERTRALINE HYDROCHLORIDE 50 MG/1
TABLET, FILM COATED ORAL
Qty: 90 TAB | Refills: 1 | Status: SHIPPED | OUTPATIENT
Start: 2020-12-21 | End: 2021-04-26

## 2021-01-06 ENCOUNTER — HOSPITAL ENCOUNTER (EMERGENCY)
Age: 46
Discharge: HOME | End: 2021-01-07
Attending: EMERGENCY MEDICINE
Payer: COMMERCIAL

## 2021-01-06 ENCOUNTER — APPOINTMENT (OUTPATIENT)
Dept: GENERAL RADIOLOGY | Age: 46
End: 2021-01-06
Attending: EMERGENCY MEDICINE
Payer: COMMERCIAL

## 2021-01-06 VITALS
HEIGHT: 64 IN | TEMPERATURE: 98.4 F | BODY MASS INDEX: 29.02 KG/M2 | SYSTOLIC BLOOD PRESSURE: 107 MMHG | OXYGEN SATURATION: 97 % | DIASTOLIC BLOOD PRESSURE: 67 MMHG | HEART RATE: 94 BPM | RESPIRATION RATE: 16 BRPM | WEIGHT: 170 LBS

## 2021-01-06 DIAGNOSIS — R11.2 NAUSEA AND VOMITING, INTRACTABILITY OF VOMITING NOT SPECIFIED, UNSPECIFIED VOMITING TYPE: ICD-10-CM

## 2021-01-06 DIAGNOSIS — E87.6 HYPOKALEMIA: ICD-10-CM

## 2021-01-06 DIAGNOSIS — R07.89 CHEST WALL PAIN: Primary | ICD-10-CM

## 2021-01-06 LAB
ANION GAP SERPL CALC-SCNC: 14 MMOL/L (ref 5–15)
BASOPHILS # BLD: 0 K/UL (ref 0–0.1)
BASOPHILS NFR BLD: 1 % (ref 0–1)
BUN SERPL-MCNC: 6 MG/DL (ref 6–20)
BUN/CREAT SERPL: 8 (ref 12–20)
CA-I BLD-MCNC: 8.9 MG/DL (ref 8.5–10.1)
CHLORIDE SERPL-SCNC: 101 MMOL/L (ref 97–108)
CO2 SERPL-SCNC: 23 MMOL/L (ref 21–32)
CREAT SERPL-MCNC: 0.71 MG/DL (ref 0.55–1.02)
DIFFERENTIAL METHOD BLD: ABNORMAL
EOSINOPHIL # BLD: 0.1 K/UL (ref 0–0.4)
EOSINOPHIL NFR BLD: 1 % (ref 0–7)
ERYTHROCYTE [DISTWIDTH] IN BLOOD BY AUTOMATED COUNT: 13.6 % (ref 11.5–14.5)
GLUCOSE SERPL-MCNC: 83 MG/DL (ref 65–100)
HCT VFR BLD AUTO: 48 % (ref 35–47)
HGB BLD-MCNC: 16.6 G/DL (ref 11.5–16)
IMM GRANULOCYTES # BLD AUTO: 0 K/UL (ref 0–0.04)
IMM GRANULOCYTES NFR BLD AUTO: 0 % (ref 0–0.5)
INR PPP: 1 (ref 0.9–1.1)
LYMPHOCYTES # BLD: 2.1 K/UL (ref 0.8–3.5)
LYMPHOCYTES NFR BLD: 38 % (ref 12–49)
MCH RBC QN AUTO: 31.4 PG (ref 26–34)
MCHC RBC AUTO-ENTMCNC: 34.6 G/DL (ref 30–36.5)
MCV RBC AUTO: 90.7 FL (ref 80–99)
MONOCYTES # BLD: 0.5 K/UL (ref 0–1)
MONOCYTES NFR BLD: 10 % (ref 5–13)
NEUTS SEG # BLD: 2.8 K/UL (ref 1.8–8)
NEUTS SEG NFR BLD: 50 % (ref 32–75)
PLATELET # BLD AUTO: 301 K/UL (ref 150–400)
PMV BLD AUTO: 9.2 FL (ref 8.9–12.9)
POTASSIUM SERPL-SCNC: 2.8 MMOL/L (ref 3.5–5.1)
PROTHROMBIN TIME: 9.8 SEC (ref 9–11.1)
RBC # BLD AUTO: 5.29 M/UL (ref 3.8–5.2)
SODIUM SERPL-SCNC: 138 MMOL/L (ref 136–145)
TROPONIN I SERPL-MCNC: <0.05 NG/ML
WBC # BLD AUTO: 5.5 K/UL (ref 3.6–11)

## 2021-01-06 PROCEDURE — 36415 COLL VENOUS BLD VENIPUNCTURE: CPT

## 2021-01-06 PROCEDURE — 80048 BASIC METABOLIC PNL TOTAL CA: CPT

## 2021-01-06 PROCEDURE — 99284 EMERGENCY DEPT VISIT MOD MDM: CPT

## 2021-01-06 PROCEDURE — 93005 ELECTROCARDIOGRAM TRACING: CPT

## 2021-01-06 PROCEDURE — 85610 PROTHROMBIN TIME: CPT

## 2021-01-06 PROCEDURE — 85025 COMPLETE CBC W/AUTO DIFF WBC: CPT

## 2021-01-06 PROCEDURE — 84484 ASSAY OF TROPONIN QUANT: CPT

## 2021-01-06 PROCEDURE — 74011250636 HC RX REV CODE- 250/636: Performed by: EMERGENCY MEDICINE

## 2021-01-06 PROCEDURE — 74011250637 HC RX REV CODE- 250/637: Performed by: EMERGENCY MEDICINE

## 2021-01-06 PROCEDURE — 71045 X-RAY EXAM CHEST 1 VIEW: CPT

## 2021-01-06 PROCEDURE — 96374 THER/PROPH/DIAG INJ IV PUSH: CPT

## 2021-01-06 RX ORDER — POTASSIUM CHLORIDE 1500 MG/1
20 TABLET, FILM COATED, EXTENDED RELEASE ORAL 2 TIMES DAILY
Qty: 10 TAB | Refills: 0 | Status: SHIPPED | OUTPATIENT
Start: 2021-01-06 | End: 2021-01-20

## 2021-01-06 RX ORDER — KETOROLAC TROMETHAMINE 30 MG/ML
30 INJECTION, SOLUTION INTRAMUSCULAR; INTRAVENOUS
Status: COMPLETED | OUTPATIENT
Start: 2021-01-06 | End: 2021-01-06

## 2021-01-06 RX ORDER — POTASSIUM CHLORIDE 20 MEQ/1
40 TABLET, EXTENDED RELEASE ORAL ONCE
Status: DISCONTINUED | OUTPATIENT
Start: 2021-01-07 | End: 2021-01-07

## 2021-01-06 RX ORDER — NAPROXEN SODIUM 275 MG/1
275 TABLET ORAL 2 TIMES DAILY WITH MEALS
Qty: 20 TAB | Refills: 0 | Status: SHIPPED
Start: 2021-01-06 | End: 2021-10-11

## 2021-01-06 RX ORDER — POTASSIUM CHLORIDE 7.45 MG/ML
10 INJECTION INTRAVENOUS ONCE
Status: COMPLETED | OUTPATIENT
Start: 2021-01-06 | End: 2021-01-07

## 2021-01-06 RX ORDER — ONDANSETRON 4 MG/1
4 TABLET, ORALLY DISINTEGRATING ORAL
Qty: 12 TAB | Refills: 0 | Status: SHIPPED | OUTPATIENT
Start: 2021-01-06 | End: 2021-01-20 | Stop reason: SDUPTHER

## 2021-01-06 RX ORDER — POTASSIUM CHLORIDE 20 MEQ/1
40 TABLET, EXTENDED RELEASE ORAL ONCE
Status: COMPLETED | OUTPATIENT
Start: 2021-01-06 | End: 2021-01-06

## 2021-01-06 RX ADMIN — POTASSIUM CHLORIDE 40 MEQ: 1500 TABLET, EXTENDED RELEASE ORAL at 23:12

## 2021-01-06 RX ADMIN — POTASSIUM CHLORIDE 10 MEQ: 7.46 INJECTION, SOLUTION INTRAVENOUS at 23:13

## 2021-01-06 RX ADMIN — KETOROLAC TROMETHAMINE 30 MG: 30 INJECTION, SOLUTION INTRAMUSCULAR at 23:13

## 2021-01-07 PROCEDURE — 74011250637 HC RX REV CODE- 250/637: Performed by: EMERGENCY MEDICINE

## 2021-01-07 RX ORDER — POTASSIUM CHLORIDE 750 MG/1
40 TABLET, FILM COATED, EXTENDED RELEASE ORAL
Status: COMPLETED | OUTPATIENT
Start: 2021-01-07 | End: 2021-01-07

## 2021-01-07 RX ADMIN — POTASSIUM CHLORIDE 40 MEQ: 750 TABLET, FILM COATED, EXTENDED RELEASE ORAL at 00:25

## 2021-01-07 NOTE — ED TRIAGE NOTES
Pt reports she ate tacos last night around 3 am and at 0800 began vomiting which was noted to be a lite pink tinged sputum with c/o chest heaviness, but denies cardiac like pain. Pt states even her dog got sick.

## 2021-01-07 NOTE — ED PROVIDER NOTES
EMERGENCY DEPARTMENT HISTORY AND PHYSICAL EXAM      Date: 1/6/2021  Patient Name: Cristina Siddiqui    History of Presenting Illness     Chief Complaint   Patient presents with    Chest Pain    Vomiting       History Provided By: Patient    HPI: Cristina Siddiqui, 50 y.o. female   presents to the ED with cc of left lateral chest pain. Patient stated she was laying on the sofa when she felt the left lateral chest pain just prior to arrival.  The pain is constant with a fluctuating intensity and described as sharp and stabbing. The pain is aggravated with certain movement but not with inspiration. Patient had 2 episode of vomiting prior to arrival.  No abdominal pain. No coughing. Patient feels short of breath. Patient admits to being anxious. Patient denies use of EtOH or drug abuse. PCP: Kaitlyn Martinez MD    No current facility-administered medications on file prior to encounter. Current Outpatient Medications on File Prior to Encounter   Medication Sig Dispense Refill    sertraline (ZOLOFT) 50 mg tablet TAKE 1 TABLET BY MOUTH DAILY FOR EPISODES OF ANXIETY. 90 Tab 1    pregabalin (LYRICA) 50 mg capsule Take 1 Cap by mouth three (3) times daily. Max Daily Amount: 150 mg. (Patient taking differently: Take 50 mg by mouth three (3) times daily. Indications: NEUROLOGIST PRESCRIBED) 90 Cap 1    atenoloL (TENORMIN) 50 mg tablet       busPIRone (BUSPAR) 10 mg tablet Take 1 Tab by mouth three (3) times daily. Indications: repeated episodes of anxiety 90 Tab 5    traZODone (DESYREL) 100 mg tablet Take 2 Tabs by mouth nightly as needed for Sleep. 60 Tab 5    linaclotide (LINZESS PO) Take  by mouth.  omeprazole (PRILOSEC) 20 mg capsule Take 20 mg by mouth daily as needed (if heartburn persists after first dose).       [DISCONTINUED] methylPREDNISolone (MEDROL DOSEPACK) 4 mg tablet Take as directed on the pack 1 Dose Pack 0    amLODIPine (NORVASC) 10 mg tablet TAKE 1 TABLET BY MOUTH DAILY 90 Tab 1    [DISCONTINUED] cyclobenzaprine (FLEXERIL) 5 mg tablet Take 2 Tabs by mouth nightly as needed for Muscle Spasm(s). 30 Tab 0    [DISCONTINUED] topiramate (TOPAMAX) 50 mg tablet       [DISCONTINUED] diclofenac EC (VOLTAREN) 75 mg EC tablet Take 1 Tab by mouth two (2) times a day. 60 Tab 5    [DISCONTINUED] clotrimazole-betamethasone (LOTRISONE) topical cream Apply  to affected area two (2) times a day. 15 g 0    [DISCONTINUED] progesterone (Prometrium) 200 mg capsule 12 nights per month. (Patient taking differently: 12 nights per month.   Indications: GYN PRESCRIBED) 36 Cap 2       Past History     Past Medical History:  Past Medical History:   Diagnosis Date    Arrhythmia     PAC    Arthritis     Cellulitis     cellulitis    Chest pain     Diabetes ([facility] [address]     GERD (gastroesophageal reflux disease)     H/O seasonal allergies     Headache     Hearing loss 7/10/2020    Ill-defined condition     \"twisted\" kidney right    Lyme disease     Obesity     Plantar fasciitis     PUD (peptic ulcer disease)     Trigger finger, right index finger 2016    Vitamin D deficiency        Past Surgical History:  Past Surgical History:   Procedure Laterality Date    HX GASTRIC BYPASS      HX GI      HX HEART CATHETERIZATION  7/28/15    HX ORTHOPAEDIC  7-30-15    Rt. rotator cuff repair     HX TONSILLECTOMY      MO COLSC FLX W/NDSC US XM RCTM ET AL LMTD&ADJ [address] [facility]         Family History:  Family History   Problem Relation Age of Onset    Cancer Paternal Aunt         colon polyps    Cancer Paternal Uncle         colon cancer    Stroke Mother     Heart Disease Mother     Elevated Lipids Mother     Cancer Father         prostate    Heart Disease Father         death by MI    Coronary Artery Disease Other         mother  64, father age 67 of MI    Other Brother         Passed while sleeping at 64, neck blockages and legs, hx of vascular surg    Hypertension Brother     Diabetes Brother    Ismael Tessa Problems Neg Hx        Social History:  Social History     Tobacco Use    Smoking status: Current Some Day Smoker     Packs/day: 0.50    Smokeless tobacco: Never Used   Substance Use Topics    Alcohol use: Not Currently     Comment: rare    Drug use: Never       Allergies: Allergies   Allergen Reactions    Morphine Anaphylaxis     July 5, 2016 administered after surgery for pain.  Bacitracin Other (comments)    Doxycycline Hives    Morphine Unknown (comments)    Other Medication Palpitations     Peripheral Block patient noted difficulty breathing and palpitations. Review of Systems   Review of Systems   Constitutional: Negative for activity change, appetite change, chills and fever. HENT: Negative for sore throat. Eyes: Negative for discharge. Respiratory: Negative for cough. Cardiovascular: Negative for palpitations. Gastrointestinal: Positive for vomiting. Negative for blood in stool and rectal pain. Endocrine: Negative for polyuria. Genitourinary: Negative for difficulty urinating. Musculoskeletal: Negative for arthralgias. Skin: Negative for rash. Neurological: Negative for headaches. Hematological: Negative for adenopathy. Psychiatric/Behavioral: Negative for dysphoric mood. All other systems reviewed and are negative. Physical Exam   Physical Exam  Vitals signs and nursing note reviewed. Constitutional:       Appearance: Normal appearance. HENT:      Head: Normocephalic and atraumatic. Nose: Nose normal.      Mouth/Throat:      Mouth: Mucous membranes are moist.      Pharynx: Oropharynx is clear. Eyes:      Conjunctiva/sclera: Conjunctivae normal.   Neck:      Musculoskeletal: Neck supple. Cardiovascular:      Rate and Rhythm: Normal rate and regular rhythm. Heart sounds: Normal heart sounds. Pulmonary:      Effort: Pulmonary effort is normal.      Breath sounds: Normal breath sounds. Chest:      Chest wall: Tenderness present. Abdominal:      General: Abdomen is flat. Bowel sounds are normal.      Palpations: Abdomen is soft. Musculoskeletal:      Right lower leg: No edema. Left lower leg: No edema. Lymphadenopathy:      Cervical: No cervical adenopathy. Skin:     General: Skin is warm and dry. Neurological:      General: No focal deficit present. Mental Status: She is alert and oriented to person, place, and time. Psychiatric:         Mood and Affect: Mood normal.         Diagnostic Study Results     Labs -     Recent Results (from the past 12 hour(s))   CBC WITH AUTOMATED DIFF    Collection Time: 01/06/21  9:45 PM   Result Value Ref Range    WBC 5.5 3.6 - 11.0 K/uL    RBC 5.29 (H) 3.80 - 5.20 M/uL    HGB 16.6 (H) 11.5 - 16.0 g/dL    HCT 48.0 (H) 35.0 - 47.0 %    MCV 90.7 80.0 - 99.0 FL    MCH 31.4 26.0 - 34.0 PG    MCHC 34.6 30.0 - 36.5 g/dL    RDW 13.6 11.5 - 14.5 %    PLATELET 231 484 - 546 K/uL    MPV 9.2 8.9 - 12.9 FL    NEUTROPHILS 50 32 - 75 %    LYMPHOCYTES 38 12 - 49 %    MONOCYTES 10 5 - 13 %    EOSINOPHILS 1 0 - 7 %    BASOPHILS 1 0 - 1 %    IMMATURE GRANULOCYTES 0 0.0 - 0.5 %    ABS. NEUTROPHILS 2.8 1.8 - 8.0 K/UL    ABS. LYMPHOCYTES 2.1 0.8 - 3.5 K/UL    ABS. MONOCYTES 0.5 0.0 - 1.0 K/UL    ABS. EOSINOPHILS 0.1 0.0 - 0.4 K/UL    ABS. BASOPHILS 0.0 0.0 - 0.1 K/UL    ABS. IMM.  GRANS. 0.0 0.00 - 0.04 K/UL    DF AUTOMATED     PROTHROMBIN TIME + INR    Collection Time: 01/06/21  9:45 PM   Result Value Ref Range    Prothrombin time 9.8 9.0 - 11.1 sec    INR 1.0 0.9 - 1.1     METABOLIC PANEL, BASIC    Collection Time: 01/06/21  9:45 PM   Result Value Ref Range    Sodium 138 136 - 145 mmol/L    Potassium 2.8 (L) 3.5 - 5.1 mmol/L    Chloride 101 97 - 108 mmol/L    CO2 23 21 - 32 mmol/L    Anion gap 14 5 - 15 mmol/L    Glucose 83 65 - 100 mg/dL    BUN 6 6 - 20 mg/dL    Creatinine 0.71 0.55 - 1.02 mg/dL    BUN/Creatinine ratio 8 (L) 12 - 20      GFR est AA >60 >60 ml/min/1.73m2    GFR est non-AA >60 >60 ml/min/1.73m2    Calcium 8.9 8.5 - 10.1 mg/dL   TROPONIN I    Collection Time: 01/06/21  9:45 PM   Result Value Ref Range    Troponin-I, Qt. <0.05 <0.05 ng/mL       Radiologic Studies -   XR CHEST PORT   Final Result   Impression: The cardiomediastinal silhouette is appropriate for age, technique,   and lung expansion. Pulmonary vasculature is not congested. The lungs are   essentially clear. No effusion or pneumothorax is seen. CT Results  (Last 48 hours)    None        CXR Results  (Last 48 hours)               01/06/21 2205  XR CHEST PORT Final result    Impression:  Impression: The cardiomediastinal silhouette is appropriate for age, technique,   and lung expansion. Pulmonary vasculature is not congested. The lungs are   essentially clear. No effusion or pneumothorax is seen. Narrative:  1 view                   Medical Decision Making   I am the first provider for this patient. I reviewed the vital signs, available nursing notes, past medical history, past surgical history, family history and social history. Vital Signs-Reviewed the patient's vital signs. Patient Vitals for the past 12 hrs:   Temp Pulse Resp BP SpO2   01/06/21 2138 98.4 °F (36.9 °C) 94 16 107/67 97 %       Records Reviewed:     Provider Notes (Medical Decision Making):       ED Course:   Initial assessment performed. The patients presenting problems have been discussed, and they are in agreement with the care plan formulated and outlined with them. I have encouraged them to ask questions as they arise throughout their visit. ED Course as of Jan 06 2351 Wed Jan 06, 2021   2147 EKG normal sinus rhythm at 89 normal QRS QT no ectopy normal axis no ischemic changes    [SK]      ED Course User Index  [SK] Robbin Ordaz MD         PROCEDURES      Disposition: Condition stable   DC- Adult Discharges: All of the diagnostic tests were reviewed and questions answered.  Diagnosis, care plan and treatment options were discussed. understand instructions and will follow up as directed. The patients results have been reviewed with them. They have been counseled regarding their diagnosis. The patient verbally convey understanding and agreement of the signs, symptoms, diagnosis, treatment and prognosis and additionally agrees to follow up as recommended. They also agree with the care-plan and convey that all of their questions have been answered. I have also put together some discharge instructions for them that include: 1) educational information regarding their diagnosis, 2) how to care for their diagnosis at home, as well a 3) list of reasons why they would want to return to the ED prior to their follow-up appointment, should their condition change. PLAN:  1. Current Discharge Medication List      START taking these medications    Details   naproxen sodium (ANAPROX) 275 mg tablet Take 1 Tab by mouth two (2) times daily (with meals). Qty: 20 Tab, Refills: 0      ondansetron (Zofran ODT) 4 mg disintegrating tablet Take 1 Tab by mouth every eight (8) hours as needed for Nausea, Vomiting or Nausea or Vomiting. Qty: 12 Tab, Refills: 0      potassium chloride SR (K-TAB) 20 mEq tablet Take 1 Tab by mouth two (2) times a day. Qty: 10 Tab, Refills: 0           2. Follow-up Information     Follow up With Specialties Details Why Contact Info    Follow up with your primary care physician  Schedule an appointment as soon as possible for a visit in 3 days As needed         Return to ED if worse     Diagnosis     Clinical Impression:   1. Chest wall pain    2. Nausea and vomiting, intractability of vomiting not specified, unspecified vomiting type    3. Hypokalemia        Please note that this dictation was completed with [employer], the [employer] voice recognition software. Quite often unanticipated grammatical, syntax, homophones, and other interpretive errors are inadvertently transcribed by the computer software.   Please disregard these errors. Please excuse any errors that have escaped final proofreading. Thank you.

## 2021-01-08 DIAGNOSIS — M79.7 FIBROMYALGIA: ICD-10-CM

## 2021-01-08 RX ORDER — PREGABALIN 50 MG/1
CAPSULE ORAL
Qty: 90 CAP | Refills: 1 | Status: SHIPPED | OUTPATIENT
Start: 2021-01-08 | End: 2021-02-12

## 2021-01-18 RX ORDER — TOPIRAMATE 50 MG/1
TABLET, FILM COATED ORAL
Qty: 60 TAB | Refills: 3 | Status: SHIPPED | OUTPATIENT
Start: 2021-01-18 | End: 2021-07-08

## 2021-01-20 ENCOUNTER — VIRTUAL VISIT (OUTPATIENT)
Dept: FAMILY MEDICINE CLINIC | Age: 46
End: 2021-01-20
Payer: COMMERCIAL

## 2021-01-20 DIAGNOSIS — R74.01 ELEVATED AST (SGOT): Primary | ICD-10-CM

## 2021-01-20 DIAGNOSIS — R10.12 ABDOMINAL PAIN, LEFT UPPER QUADRANT: ICD-10-CM

## 2021-01-20 DIAGNOSIS — E87.6 HYPOKALEMIA: ICD-10-CM

## 2021-01-20 DIAGNOSIS — K21.00 GASTROESOPHAGEAL REFLUX DISEASE WITH ESOPHAGITIS WITHOUT HEMORRHAGE: ICD-10-CM

## 2021-01-20 DIAGNOSIS — R07.89 CHEST TIGHTNESS: ICD-10-CM

## 2021-01-20 DIAGNOSIS — R11.2 NON-INTRACTABLE VOMITING WITH NAUSEA, UNSPECIFIED VOMITING TYPE: ICD-10-CM

## 2021-01-20 PROCEDURE — 99213 OFFICE O/P EST LOW 20 MIN: CPT | Performed by: FAMILY MEDICINE

## 2021-01-20 RX ORDER — FAMOTIDINE 40 MG/1
40 TABLET, FILM COATED ORAL
Qty: 90 TAB | Refills: 1 | Status: SHIPPED | OUTPATIENT
Start: 2021-01-20

## 2021-01-20 RX ORDER — ONDANSETRON 4 MG/1
4 TABLET, ORALLY DISINTEGRATING ORAL
Qty: 12 TAB | Refills: 2 | Status: SHIPPED | OUTPATIENT
Start: 2021-01-20

## 2021-01-20 RX ORDER — PANTOPRAZOLE SODIUM 40 MG/1
40 TABLET, DELAYED RELEASE ORAL DAILY
Qty: 90 TAB | Refills: 1 | Status: SHIPPED | OUTPATIENT
Start: 2021-01-20

## 2021-01-20 RX ORDER — POTASSIUM CHLORIDE 20 MEQ/1
20 TABLET, EXTENDED RELEASE ORAL 2 TIMES DAILY
Qty: 30 TAB | Refills: 0 | Status: SHIPPED | OUTPATIENT
Start: 2021-01-20

## 2021-01-20 NOTE — PROGRESS NOTES
Consent: Jim Severance, who was seen by synchronous (real-time) audio-video technology, and/or her healthcare decision maker, is aware that this patient-initiated, Telehealth encounter on 1/20/2021 is a billable service, with coverage as determined by her insurance carrier. She is aware that she may receive a bill and has provided verbal consent to proceed: Yes. Assessment & Plan:   Diagnoses and all orders for this visit:    1. Elevated AST (SGOT)  -     REFERRAL TO GASTROENTEROLOGY  -     METABOLIC PANEL, COMPREHENSIVE; Future    2. Abdominal pain, left upper quadrant  -     REFERRAL TO GASTROENTEROLOGY  -     METABOLIC PANEL, COMPREHENSIVE; Future    3. Hypokalemia  -     potassium chloride (K-DUR, KLOR-CON) 20 mEq tablet; Take 1 Tab by mouth two (2) times a day. -     METABOLIC PANEL, COMPREHENSIVE; Future    4. Chest tightness    5. Gastroesophageal reflux disease with esophagitis without hemorrhage  -     pantoprazole (PROTONIX) 40 mg tablet; Take 1 Tab by mouth daily. -     famotidine (PEPCID) 40 mg tablet; Take 1 Tab by mouth nightly. 6. Non-intractable vomiting with nausea, unspecified vomiting type  -     ondansetron (Zofran ODT) 4 mg disintegrating tablet; Take 1 Tab by mouth every eight (8) hours as needed for Nausea, Vomiting or Nausea or Vomiting.  -     METABOLIC PANEL, COMPREHENSIVE; Future          Follow-up and Dispositions    · Return in about 2 weeks (around 2/3/2021), or if symptoms worsen or fail to improve. I ADVISED PATIENT TO GO TO ER IF SYMPTOMS WORSEN , CHANGE OR FAILS TO IMPROVE. I spent at least 15 minutes with this established patient, and >50% of the time was spent counseling and/or coordinating care regarding SEE BELOW  [id number]  Subjective:   Jim Severance is a 50 y.o. 1975 female  established patient, who was seen for GERD          1. Elevated AST (SGOT)  Check labs. Ultrasound shows fatty liver disease.     2. Abdominal pain, left upper quadrant  Continues to have pain in left upper quadrant. Requests referral for GI.    3. Hypokalemia  Potassium: 2.8. Patient was prescribed potassium in the ER. She has run out of it. Request refills of potassium supplement. I will check labs. 4. Chest tightness  Patient went to ER on 1/6/2021 with chief complaint of chest pain and chest tightness. EKG and cardiac enzymes were normal.  She continues to have symptoms of chest tightness and shortness of breath. This is intermittent. Her cardiologist is Dr. Lloyd Dimas.  Chest pain radiates to the back. I advised patient to make appointment with cardiology ASAP for further evaluation. She was advised to get stress test when she saw cardiology in June 2020. She did not schedule the stress test at that time. Patient will call cardiology office today. I also advised her to go to ER if symptoms worsen or fail to improve. 5. Gastroesophageal reflux disease with esophagitis without hemorrhage  Continues to have symptoms of heartburn. Takes omeprazole twice a day which does not help her symptoms. I will change to Protonix and Pepcid. 6. Non-intractable vomiting with nausea, unspecified vomiting type  Patient was prescribed Zofran for nausea and vomiting by the ER. She is requesting refills of Zofran. She continues to have nausea. Prior to Admission medications    Medication Sig Start Date End Date Taking? Authorizing Provider   ondansetron (Zofran ODT) 4 mg disintegrating tablet Take 1 Tab by mouth every eight (8) hours as needed for Nausea, Vomiting or Nausea or Vomiting. 1/20/21  Yes Kiley Mendoza MD   potassium chloride (K-DUR, KLOR-CON) 20 mEq tablet Take 1 Tab by mouth two (2) times a day. 1/20/21  Yes Kiley Mendoza MD   pantoprazole (PROTONIX) 40 mg tablet Take 1 Tab by mouth daily. 1/20/21  Yes Kiley Mendoza MD   famotidine (PEPCID) 40 mg tablet Take 1 Tab by mouth nightly.  1/20/21  Yes Kiley Mendoza MD   topiramate (TOPAMAX) 50 mg tablet TAKE 1 TABLET BY MOUTH TWO TIMES A DAY FOR MIGRAINE PREVENTION 1/18/21   Meghann Macias MD   pregabalin (LYRICA) 50 mg capsule TAKE 1 CAPSULES BY MOUTH 3 TIMES A DAY 1/8/21   Refugio Osgood, NP   naproxen sodium (ANAPROX) 275 mg tablet Take 1 Tab by mouth two (2) times daily (with meals). 1/6/21   Robbin Ordaz MD   ondansetron (Zofran ODT) 4 mg disintegrating tablet Take 1 Tab by mouth every eight (8) hours as needed for Nausea, Vomiting or Nausea or Vomiting. 1/6/21 1/20/21  Robbin Ordaz MD   potassium chloride SR (K-TAB) 20 mEq tablet Take 1 Tab by mouth two (2) times a day. 1/6/21 1/20/21  Robbin Ordaz MD   sertraline (ZOLOFT) 50 mg tablet TAKE 1 TABLET BY MOUTH DAILY FOR EPISODES OF ANXIETY. 12/21/20   Meghann Macias MD   amLODIPine (NORVASC) 10 mg tablet TAKE 1 TABLET BY MOUTH DAILY 12/14/20   Meghann Macias MD   atenoloL (TENORMIN) 50 mg tablet  5/21/20   Provider, Historical   busPIRone (BUSPAR) 10 mg tablet Take 1 Tab by mouth three (3) times daily. Indications: repeated episodes of anxiety 7/8/20   Meghann Macias MD   traZODone (DESYREL) 100 mg tablet Take 2 Tabs by mouth nightly as needed for Sleep. 7/8/20   Meghann Macias MD   linaclotide (LINZESS PO) Take  by mouth.  1/20/21  Provider, Historical   omeprazole (PRILOSEC) 20 mg capsule Take 20 mg by mouth daily as needed (if heartburn persists after first dose). 1/20/21  Provider, Historical     Allergies   Allergen Reactions    Morphine Anaphylaxis     July 5, 2016 administered after surgery for pain.  Bacitracin Other (comments)    Doxycycline Hives    Morphine Unknown (comments)    Other Medication Palpitations     Peripheral Block patient noted difficulty breathing and palpitations.        Patient Active Problem List   Diagnosis Code    SOB (shortness of breath) R06.02    Chronic back pain M54.9, G89.29    Obesity E66.9    Lyme disease A69.20    Anxiety F41.9    Diverticular disease K57.90    GERD (gastroesophageal reflux disease) K21.9    Foot pain, bilateral M79.671, M79.672    Unspecified hereditary and idiopathic peripheral neuropathy G60.9    TMJ tenderness M26.629    Sinusitis J32.9    New daily persistent headache G44.52    Chest pain with normal coronary angiography R07.9    Morbid obesity with BMI of 45.0-49.9, adult ([facility]) E66.01, Z68.42    Elevated pulse rate R00.0    Cellulitis and abscess of neck L03.221, L02.11    Controlled type 2 diabetes mellitus without complication ([facility]) G02.8    Sepsis ([facility]) A41.9    Migraine without status migrainosus, not intractable G43.909    Anxiety F41.9    Gastroparesis K31.84    Other hyperlipidemia E78.49    Controlled type 2 diabetes mellitus without complication, without long-term current use of insulin ([facility]) E11.9    Hearing loss H91.90     Patient Active Problem List    Diagnosis Date Noted    Hearing loss 07/10/2020    Migraine without status migrainosus, not intractable 05/20/2020    Anxiety 05/20/2020    Gastroparesis 05/20/2020    Other hyperlipidemia 05/20/2020    Controlled type 2 diabetes mellitus without complication, without long-term current use of insulin ([facility] [address] 05/20/2020    Cellulitis and abscess of neck 12/03/2016    Controlled type 2 diabetes mellitus without complication ([facility] [address] 88/71/1896    Sepsis ([facility] [address] 12/03/2016    Elevated pulse rate 08/31/2015    Chest pain with normal coronary angiography 07/28/2015    Morbid obesity with BMI of 45.0-49.9, adult ([facility]) 07/28/2015    TMJ tenderness 06/01/2015    Sinusitis 06/01/2015    New daily persistent headache 06/01/2015    Foot pain, bilateral 04/08/2015    Unspecified hereditary and idiopathic peripheral neuropathy 04/08/2015    Chronic back pain 03/13/2014    Obesity 03/13/2014    Lyme disease 03/13/2014    Anxiety 03/13/2014    Diverticular disease 03/13/2014    GERD (gastroesophageal reflux disease) 03/13/2014    SOB (shortness of breath) 03/12/2014     Current Outpatient Medications   Medication Sig Dispense Refill    ondansetron (Zofran ODT) 4 mg disintegrating tablet Take 1 Tab by mouth every eight (8) hours as needed for Nausea, Vomiting or Nausea or Vomiting. 12 Tab 2    potassium chloride (K-DUR, KLOR-CON) 20 mEq tablet Take 1 Tab by mouth two (2) times a day. 30 Tab 0    pantoprazole (PROTONIX) 40 mg tablet Take 1 Tab by mouth daily. 90 Tab 1    famotidine (PEPCID) 40 mg tablet Take 1 Tab by mouth nightly. 90 Tab 1    topiramate (TOPAMAX) 50 mg tablet TAKE 1 TABLET BY MOUTH TWO TIMES A DAY FOR MIGRAINE PREVENTION 60 Tab 3    pregabalin (LYRICA) 50 mg capsule TAKE 1 CAPSULES BY MOUTH 3 TIMES A DAY 90 Cap 1    naproxen sodium (ANAPROX) 275 mg tablet Take 1 Tab by mouth two (2) times daily (with meals). 20 Tab 0    sertraline (ZOLOFT) 50 mg tablet TAKE 1 TABLET BY MOUTH DAILY FOR EPISODES OF ANXIETY. 90 Tab 1    amLODIPine (NORVASC) 10 mg tablet TAKE 1 TABLET BY MOUTH DAILY 90 Tab 1    atenoloL (TENORMIN) 50 mg tablet       busPIRone (BUSPAR) 10 mg tablet Take 1 Tab by mouth three (3) times daily. Indications: repeated episodes of anxiety 90 Tab 5    traZODone (DESYREL) 100 mg tablet Take 2 Tabs by mouth nightly as needed for Sleep. 60 Tab 5     Allergies   Allergen Reactions    Morphine Anaphylaxis     July 5, 2016 administered after surgery for pain.  Bacitracin Other (comments)    Doxycycline Hives    Morphine Unknown (comments)    Other Medication Palpitations     Peripheral Block patient noted difficulty breathing and palpitations.      Past Medical History:   Diagnosis Date    Arrhythmia     PAC    Arthritis     Cellulitis     cellulitis    Chest pain     Diabetes ([facility] [address]     GERD (gastroesophageal reflux disease)     H/O seasonal allergies     Headache     Hearing loss 7/10/2020    Ill-defined condition     \"twisted\" kidney right    Lyme disease     Obesity     Plantar fasciitis     PUD (peptic ulcer disease)     Trigger finger, right index finger 2/2016    Vitamin D deficiency      Past Surgical History:   Procedure Laterality Date    HX GASTRIC BYPASS      HX GI      HX HEART CATHETERIZATION  7/28/15    HX ORTHOPAEDIC  7-30-15    Rt. rotator cuff repair     HX TONSILLECTOMY      OR COLSC FLX W/NDSC US XM RCTM ET AL LMTD&ADJ STRUX  2011     Family History   Problem Relation Age of Onset    Cancer Paternal Aunt         colon polyps    Cancer Paternal Uncle         colon cancer    Stroke Mother     Heart Disease Mother     Elevated Lipids Mother     Cancer Father         prostate    Heart Disease Father         death by MI    Coronary Artery Disease Other         mother  64, father age 67 of MI    Other Brother         Passed while sleeping at 64, neck blockages and legs, hx of vascular surg    Hypertension Brother     Diabetes Brother     Anesth Problems Neg Hx      Social History     Tobacco Use    Smoking status: Current Some Day Smoker     Packs/day: 0.50    Smokeless tobacco: Never Used   Substance Use Topics    Alcohol use: Not Currently     Comment: rare       Review of Systems   Constitutional: Negative. HENT: Negative. Eyes: Negative. Respiratory: Negative. Cardiovascular: Positive for chest pain. Gastrointestinal: Positive for abdominal pain, heartburn, nausea and vomiting. Genitourinary: Negative. Musculoskeletal: Negative. Skin: Negative. Neurological: Negative. Endo/Heme/Allergies: Negative. Psychiatric/Behavioral: Negative.             Objective:     Patient-Reported Vitals 10/15/2020   Patient-Reported Weight 175lb   Patient-Reported Height -        [INSTRUCTIONS:  \"[x]\" Indicates a positive item  \"[]\" Indicates a negative item  -- DELETE ALL ITEMS NOT EXAMINED]    Constitutional: [x] Appears well-developed and well-nourished [x] No apparent distress      [] Abnormal -     Mental status: [x] Alert and awake  [x] Oriented to person/place/time [x] Able to follow commands    [] Abnormal -     Eyes:   EOM [x]  Normal    [] Abnormal -   Sclera  [x]  Normal    [] Abnormal -          Discharge [x]  None visible   [] Abnormal -     HENT: [x] Normocephalic, atraumatic  [] Abnormal -   [x] Mouth/Throat: Mucous membranes are moist    External Ears [x] Normal  [] Abnormal -    Neck: [x] No visualized mass [] Abnormal -     Pulmonary/Chest: [x] Respiratory effort normal   [x] No visualized signs of difficulty breathing or respiratory distress        [] Abnormal -      Musculoskeletal:   [x] Normal gait with no signs of ataxia         [x] Normal range of motion of neck        [] Abnormal -     Neurological:        [x] No Facial Asymmetry (Cranial nerve 7 motor function) (limited exam due to video visit)          [x] No gaze palsy        [] Abnormal -          Skin:        [x] No significant exanthematous lesions or discoloration noted on facial skin         [] Abnormal -            Psychiatric:       [x] Normal Affect [] Abnormal -        [x] No Hallucinations    Other pertinent observable physical exam findings:-    We discussed the expected course, resolution and complications of the diagnosis(es) in detail. Medication risks, benefits, costs, interactions, and alternatives were discussed as indicated. I advised her to contact the office if her condition worsens, changes or fails to improve as anticipated. She expressed understanding with the diagnosis(es) and plan. Cristina Siddiqui is a 50 y.o. female  is being evaluated by a Virtual Visit (video visit) encounter to address concerns as mentioned above. A caregiver was present when appropriate. Due to this being a TeleHealth encounter (During JOFAL-00 public health emergency), evaluation of the following organ systems was limited: Vitals/Constitutional/EENT/Resp/CV/GI//MS/Neuro/Skin/Heme-Lymph-Imm.   Pursuant to the emergency declaration under the [facility] waiver authority and the [employer] Supplemental Appropriations Act, this Virtual Visit was conducted with patient's (and/or legal guardian's) consent, to reduce the patient's risk of exposure to COVID-19 and provide necessary medical care. The patient (and/or legal guardian) has also been advised to contact this office for worsening conditions or problems, and seek emergency medical treatment and/or call 911 if deemed necessary. Patient identification was verified at the start of the visit: YES    Services were provided through a video synchronous discussion virtually to substitute for in-person clinic visit. Patient and provider were located at their individual homes. An electronic signature was used to authenticate this note.       Mary Grande MD

## 2021-02-05 DIAGNOSIS — F41.9 ANXIETY: ICD-10-CM

## 2021-02-05 DIAGNOSIS — G47.00 INSOMNIA, UNSPECIFIED TYPE: ICD-10-CM

## 2021-02-08 RX ORDER — BUSPIRONE HYDROCHLORIDE 10 MG/1
TABLET ORAL
Qty: 90 TAB | Refills: 3 | Status: SHIPPED | OUTPATIENT
Start: 2021-02-08 | End: 2021-06-08

## 2021-02-08 RX ORDER — TRAZODONE HYDROCHLORIDE 100 MG/1
TABLET ORAL
Qty: 60 TAB | Refills: 3 | Status: SHIPPED | OUTPATIENT
Start: 2021-02-08 | End: 2021-06-08

## 2021-02-11 DIAGNOSIS — M79.7 FIBROMYALGIA: ICD-10-CM

## 2021-02-12 RX ORDER — PREGABALIN 50 MG/1
CAPSULE ORAL
Qty: 90 CAP | Refills: 3 | Status: SHIPPED | OUTPATIENT
Start: 2021-02-12 | End: 2021-07-07

## 2021-04-02 LAB
ALBUMIN SERPL-MCNC: 4 G/DL (ref 3.5–5)
ALBUMIN/GLOB SERPL: 1.1 {RATIO} (ref 1.1–2.2)
ALP SERPL-CCNC: 105 U/L (ref 45–117)
ALT SERPL-CCNC: 26 U/L (ref 12–78)
ANION GAP SERPL CALC-SCNC: 7 MMOL/L (ref 5–15)
AST SERPL-CCNC: 23 U/L (ref 15–37)
BILIRUB SERPL-MCNC: 0.5 MG/DL (ref 0.2–1)
BUN SERPL-MCNC: 10 MG/DL (ref 6–20)
BUN/CREAT SERPL: 15 (ref 12–20)
CALCIUM SERPL-MCNC: 8.8 MG/DL (ref 8.5–10.1)
CHLORIDE SERPL-SCNC: 103 MMOL/L (ref 97–108)
CO2 SERPL-SCNC: 26 MMOL/L (ref 21–32)
CREAT SERPL-MCNC: 0.66 MG/DL (ref 0.55–1.02)
GLOBULIN SER CALC-MCNC: 3.6 G/DL (ref 2–4)
GLUCOSE SERPL-MCNC: 92 MG/DL (ref 65–100)
POTASSIUM SERPL-SCNC: 4.2 MMOL/L (ref 3.5–5.1)
PROT SERPL-MCNC: 7.6 G/DL (ref 6.4–8.2)
SODIUM SERPL-SCNC: 136 MMOL/L (ref 136–145)

## 2021-04-26 DIAGNOSIS — F41.9 ANXIETY: ICD-10-CM

## 2021-04-26 RX ORDER — SERTRALINE HYDROCHLORIDE 50 MG/1
TABLET, FILM COATED ORAL
Qty: 90 TAB | Refills: 0 | Status: SHIPPED | OUTPATIENT
Start: 2021-04-26 | End: 2021-07-19

## 2021-06-03 ENCOUNTER — TELEPHONE (OUTPATIENT)
Dept: OBGYN CLINIC | Age: 46
End: 2021-06-03

## 2021-06-03 NOTE — TELEPHONE ENCOUNTER
Call received at 10:05am      48year old patient last seen in the office on 6//25/2020 for annual exam      Patient calling to say that she has gone a year with out vaginal bleeding? Patient reports she started with bright red vaginal bleeding on 5/.24/2021 and it is continuing at present    Patient reports she is changing her pad every 2-3 hours and reports some cramping off and on at 5-6 on the pain scale of 1-10    Patient is wondering how to proceed    Please advise        ? Ov ?  Ultrasound      Thank you Appropriate

## 2021-06-03 NOTE — TELEPHONE ENCOUNTER
This nurse attempted to reach the patient and left a detailed message for the patient to call the office back to discuss Md recommendations    SIS on 6/15/2021 at 2:30PM scheduled     Please advise patient

## 2021-06-03 NOTE — TELEPHONE ENCOUNTER
Patient called back and was reschedule for SIS on 6/18/2021      Patient advised of MD recommendations and instructions regarding the procedure    Patient verbalized understanding.

## 2021-06-08 DIAGNOSIS — F41.9 ANXIETY: ICD-10-CM

## 2021-06-08 DIAGNOSIS — G47.00 INSOMNIA, UNSPECIFIED TYPE: ICD-10-CM

## 2021-06-08 RX ORDER — TRAZODONE HYDROCHLORIDE 100 MG/1
TABLET ORAL
Qty: 60 TABLET | Refills: 2 | Status: SHIPPED | OUTPATIENT
Start: 2021-06-08 | End: 2021-07-23 | Stop reason: SDUPTHER

## 2021-06-08 RX ORDER — BUSPIRONE HYDROCHLORIDE 10 MG/1
TABLET ORAL
Qty: 90 TABLET | Refills: 2 | Status: SHIPPED | OUTPATIENT
Start: 2021-06-08 | End: 2021-09-07

## 2021-06-11 RX ORDER — DICLOFENAC SODIUM 75 MG/1
TABLET, DELAYED RELEASE ORAL
Qty: 60 TABLET | Refills: 4 | Status: SHIPPED | OUTPATIENT
Start: 2021-06-11 | End: 2021-10-11

## 2021-07-08 RX ORDER — TOPIRAMATE 50 MG/1
TABLET, FILM COATED ORAL
Qty: 60 TABLET | Refills: 2 | Status: SHIPPED | OUTPATIENT
Start: 2021-07-08 | End: 2021-09-07

## 2021-07-09 ENCOUNTER — VIRTUAL VISIT (OUTPATIENT)
Dept: FAMILY MEDICINE CLINIC | Age: 46
End: 2021-07-09
Payer: COMMERCIAL

## 2021-07-09 DIAGNOSIS — M54.42 CHRONIC LEFT-SIDED LOW BACK PAIN WITH LEFT-SIDED SCIATICA: Primary | ICD-10-CM

## 2021-07-09 DIAGNOSIS — G89.29 CHRONIC LEFT-SIDED LOW BACK PAIN WITH LEFT-SIDED SCIATICA: Primary | ICD-10-CM

## 2021-07-09 PROCEDURE — 99213 OFFICE O/P EST LOW 20 MIN: CPT | Performed by: FAMILY MEDICINE

## 2021-07-09 RX ORDER — CYCLOBENZAPRINE HCL 10 MG
10 TABLET ORAL
Qty: 90 TABLET | Refills: 1 | Status: SHIPPED | OUTPATIENT
Start: 2021-07-09 | End: 2021-09-07

## 2021-07-09 NOTE — PROGRESS NOTES
Consent: Nelda Cardenas, who was seen by synchronous (real-time) audio-video technology, and/or her healthcare decision maker, is aware that this patient-initiated, Telehealth encounter on 7/9/2021 is a billable service, with coverage as determined by her insurance carrier. She is aware that she may receive a bill and has provided verbal consent to proceed: Yes. Assessment & Plan:   Diagnoses and all orders for this visit:    1. Chronic left-sided low back pain with left-sided sciatica  -     REFERRAL TO ORTHOPEDICS  -     cyclobenzaprine (FLEXERIL) 10 mg tablet; Take 1 Tablet by mouth three (3) times daily as needed for Muscle Spasm(s). Follow-up and Dispositions    · Return if symptoms worsen or fail to improve. I ADVISED PATIENT TO GO TO ER IF SYMPTOMS WORSEN , CHANGE OR FAILS TO IMPROVE. I spent at least 15 minutes with this established patient, and >50% of the time was spent counseling and/or coordinating care regarding SEE BELOW  [phone]  Subjective:   Nelda Cardenas is a 48 y.o. 1975 female  established patient, who was seen for Medication Refill          1. Chronic left-sided low back pain with left-sided sciatica  Today's visit is for follow-up of chronic low back pain which radiates to left side and down to her left leg. Patient has been evaluated by Dr. Jonas Pagan at [address] [place] [address] in the past.  An MRI was also done. Patient reports she also got a cortisone shot which did not help her symptoms. She is requesting a new Ortho referral for second opinion. I will place new referral.  She is also requesting refill of Flexeril. She takes Flexeril 10 mg 3 times a day as needed. She reports she does not drive. I have advised her not to drive or operate any heavy machinery with this medication. She reports no side effects from current dose. Prior to Admission medications    Medication Sig Start Date End Date Taking?  Authorizing Provider   cyclobenzaprine (FLEXERIL) 10 mg tablet Take 1 Tablet by mouth three (3) times daily as needed for Muscle Spasm(s). 7/9/21  Yes Contreras Tam MD   topiramate (TOPAMAX) 50 mg tablet TAKE 1 TABLET BY MOUTH TWO TIMES A DAY FOR MIGRAINE PREVENTION 7/8/21   Contreras Tam MD   pregabalin (LYRICA) 50 mg capsule TAKE 1 CAPSULES BY MOUTH 3 TIMES A DAY 7/7/21   Michelle PENA NP   diclofenac EC (VOLTAREN) 75 mg EC tablet TAKE 1 TABLET BY MOUTH TWO TIMES A DAY 6/11/21   Contreras Tam MD   busPIRone (BUSPAR) 10 mg tablet TAKE 1 TABLET BY MOUTH 3 TIMES A DAY 6/8/21   Contreras Tam MD   traZODone (DESYREL) 100 mg tablet TAKE TWO TABLET BY MOUTH AT BEDTIME 6/8/21   Contreras Tam MD   sertraline (ZOLOFT) 50 mg tablet TAKE 1 TABLET BY MOUTH DAILY 4/26/21   Contreras Tam MD   ondansetron (Zofran ODT) 4 mg disintegrating tablet Take 1 Tab by mouth every eight (8) hours as needed for Nausea, Vomiting or Nausea or Vomiting. 1/20/21   Contreras Tam MD   potassium chloride (K-DUR, KLOR-CON) 20 mEq tablet Take 1 Tab by mouth two (2) times a day. 1/20/21   Contreras Tam MD   pantoprazole (PROTONIX) 40 mg tablet Take 1 Tab by mouth daily. 1/20/21   Contreras Tam MD   famotidine (PEPCID) 40 mg tablet Take 1 Tab by mouth nightly. 1/20/21   Contreras Tam MD   naproxen sodium (ANAPROX) 275 mg tablet Take 1 Tab by mouth two (2) times daily (with meals). 1/6/21   Aleksey Lopez MD   amLODIPine (NORVASC) 10 mg tablet TAKE 1 TABLET BY MOUTH DAILY 12/14/20   Contreras Tam MD   atenoloL (TENORMIN) 50 mg tablet  5/21/20   Provider, Historical     Allergies   Allergen Reactions    Morphine Anaphylaxis     July 5, 2016 administered after surgery for pain.  Bacitracin Other (comments)    Doxycycline Hives    Morphine Unknown (comments)    Other Medication Palpitations     Peripheral Block patient noted difficulty breathing and palpitations.        Patient Active Problem List   Diagnosis Code    SOB (shortness of breath) R06.02    Chronic back pain M54.9, G89.29    Obesity E66.9    Lyme disease A69.20    Anxiety F41.9    Diverticular disease K57.90    GERD (gastroesophageal reflux disease) K21.9    Foot pain, bilateral M79.671, M79.672    Unspecified hereditary and idiopathic peripheral neuropathy G60.9    TMJ tenderness M26.629    Sinusitis J32.9    New daily persistent headache G44.52    Chest pain with normal coronary angiography R07.9    Morbid obesity with BMI of 45.0-49.9, adult ([facility]) E66.01, Z68.42    Elevated pulse rate R00.0    Cellulitis and abscess of neck L03.221, L02.11    Controlled type 2 diabetes mellitus without complication ([facility]) U02.1    Sepsis ([facility]) A41.9    Migraine without status migrainosus, not intractable G43.909    Anxiety F41.9    Gastroparesis K31.84    Other hyperlipidemia E78.49    Controlled type 2 diabetes mellitus without complication, without long-term current use of insulin ([facility]) E11.9    Hearing loss H91.90     Patient Active Problem List    Diagnosis Date Noted    Hearing loss 07/10/2020    Migraine without status migrainosus, not intractable 05/20/2020    Anxiety 05/20/2020    Gastroparesis 05/20/2020    Other hyperlipidemia 05/20/2020    Controlled type 2 diabetes mellitus without complication, without long-term current use of insulin ([facility] 05/20/2020    Cellulitis and abscess of neck 12/03/2016    Controlled type 2 diabetes mellitus without complication ([facility] 14/18/5071    Sepsis ([facility] 12/03/2016    Elevated pulse rate 08/31/2015    Chest pain with normal coronary angiography 07/28/2015    Morbid obesity with BMI of 45.0-49.9, adult ([facility] 07/28/2015    TMJ tenderness 06/01/2015    Sinusitis 06/01/2015    New daily persistent headache 06/01/2015    Foot pain, bilateral 04/08/2015    Unspecified hereditary and idiopathic peripheral neuropathy 04/08/2015    Chronic back pain 03/13/2014    Obesity 03/13/2014    Lyme disease 03/13/2014    Anxiety 03/13/2014    Diverticular disease 03/13/2014    GERD (gastroesophageal reflux disease) 03/13/2014    SOB (shortness of breath) 03/12/2014     Current Outpatient Medications   Medication Sig Dispense Refill    cyclobenzaprine (FLEXERIL) 10 mg tablet Take 1 Tablet by mouth three (3) times daily as needed for Muscle Spasm(s). 90 Tablet 1    topiramate (TOPAMAX) 50 mg tablet TAKE 1 TABLET BY MOUTH TWO TIMES A DAY FOR MIGRAINE PREVENTION 60 Tablet 2    pregabalin (LYRICA) 50 mg capsule TAKE 1 CAPSULES BY MOUTH 3 TIMES A DAY 90 Capsule 0    diclofenac EC (VOLTAREN) 75 mg EC tablet TAKE 1 TABLET BY MOUTH TWO TIMES A DAY 60 Tablet 4    busPIRone (BUSPAR) 10 mg tablet TAKE 1 TABLET BY MOUTH 3 TIMES A DAY 90 Tablet 2    traZODone (DESYREL) 100 mg tablet TAKE TWO TABLET BY MOUTH AT BEDTIME 60 Tablet 2    sertraline (ZOLOFT) 50 mg tablet TAKE 1 TABLET BY MOUTH DAILY 90 Tab 0    ondansetron (Zofran ODT) 4 mg disintegrating tablet Take 1 Tab by mouth every eight (8) hours as needed for Nausea, Vomiting or Nausea or Vomiting. 12 Tab 2    potassium chloride (K-DUR, KLOR-CON) 20 mEq tablet Take 1 Tab by mouth two (2) times a day. 30 Tab 0    pantoprazole (PROTONIX) 40 mg tablet Take 1 Tab by mouth daily. 90 Tab 1    famotidine (PEPCID) 40 mg tablet Take 1 Tab by mouth nightly. 90 Tab 1    naproxen sodium (ANAPROX) 275 mg tablet Take 1 Tab by mouth two (2) times daily (with meals). 20 Tab 0    amLODIPine (NORVASC) 10 mg tablet TAKE 1 TABLET BY MOUTH DAILY 90 Tab 1    atenoloL (TENORMIN) 50 mg tablet        Allergies   Allergen Reactions    Morphine Anaphylaxis     July 5, 2016 administered after surgery for pain.  Bacitracin Other (comments)    Doxycycline Hives    Morphine Unknown (comments)    Other Medication Palpitations     Peripheral Block patient noted difficulty breathing and palpitations.      Past Medical History:   Diagnosis Date    Arrhythmia     PAC    Arthritis     Cellulitis     cellulitis    Chest pain     Diabetes (HCC)     GERD (gastroesophageal reflux disease)     H/O seasonal allergies     Headache     Hearing loss 7/10/2020    Ill-defined condition     \"twisted\" kidney right    Lyme disease     Obesity     Plantar fasciitis     PUD (peptic ulcer disease)     Trigger finger, right index finger 2016    Vitamin D deficiency      Past Surgical History:   Procedure Laterality Date    HX GASTRIC BYPASS      HX GI      HX HEART CATHETERIZATION  7/28/15    HX ORTHOPAEDIC  7-30-15    Rt. rotator cuff repair     HX TONSILLECTOMY      WA COLSC FLX W/NDSC US XM RCTM ET AL LMTD&ADJ [facility]       Family History   Problem Relation Age of Onset    Cancer Paternal Aunt         colon polyps    Cancer Paternal Uncle         colon cancer    Stroke Mother     Heart Disease Mother     Elevated Lipids Mother     Cancer Father         prostate    Heart Disease Father         death by MI    Coronary Artery Disease Other         mother  64, father age 67 of MI    Other Brother         Passed while sleeping at 64, neck blockages and legs, hx of vascular surg    Hypertension Brother     Diabetes Brother     Anesth Problems Neg Hx      Social History     Tobacco Use    Smoking status: Current Some Day Smoker     Packs/day: 0.50    Smokeless tobacco: Never Used   Substance Use Topics    Alcohol use: Not Currently     Comment: rare       Review of Systems   Constitutional: Negative. HENT: Negative. Eyes: Negative. Respiratory: Negative. Cardiovascular: Negative. Gastrointestinal: Negative. Genitourinary: Negative. Musculoskeletal: Positive for back pain and myalgias. Skin: Negative. Neurological: Negative. Endo/Heme/Allergies: Negative. Psychiatric/Behavioral: Negative.             Objective:     Patient-Reported Vitals 10/15/2020   Patient-Reported Weight 175lb   Patient-Reported Height -        [INSTRUCTIONS:  \"[x]\" Indicates a positive item  \"[]\" Indicates a negative item  -- DELETE ALL ITEMS NOT EXAMINED]    Constitutional: [x] Appears well-developed and well-nourished [x] No apparent distress      [] Abnormal -     Mental status: [x] Alert and awake  [x] Oriented to person/place/time [x] Able to follow commands    [] Abnormal -     Eyes:   EOM    [x]  Normal    [] Abnormal -   Sclera  [x]  Normal    [] Abnormal -          Discharge [x]  None visible   [] Abnormal -     HENT: [x] Normocephalic, atraumatic  [] Abnormal -   [x] Mouth/Throat: Mucous membranes are moist    External Ears [x] Normal  [] Abnormal -    Neck: [x] No visualized mass [] Abnormal -     Pulmonary/Chest: [x] Respiratory effort normal   [x] No visualized signs of difficulty breathing or respiratory distress        [] Abnormal -      Musculoskeletal:   [x] Normal gait with no signs of ataxia         [x] Normal range of motion of neck        [] Abnormal -     Neurological:        [x] No Facial Asymmetry (Cranial nerve 7 motor function) (limited exam due to video visit)          [x] No gaze palsy        [] Abnormal -          Skin:        [x] No significant exanthematous lesions or discoloration noted on facial skin         [] Abnormal -            Psychiatric:       [x] Normal Affect [] Abnormal -        [x] No Hallucinations    Other pertinent observable physical exam findings:-    We discussed the expected course, resolution and complications of the diagnosis(es) in detail. Medication risks, benefits, costs, interactions, and alternatives were discussed as indicated. I advised her to contact the office if her condition worsens, changes or fails to improve as anticipated. She expressed understanding with the diagnosis(es) and plan. Sae Robin is a 48 y.o. female  is being evaluated by a Virtual Visit (video visit) encounter to address concerns as mentioned above. A caregiver was present when appropriate.  Due to this being a TeleHealth encounter (During OCIOS-43 public health emergency), evaluation of the following organ systems was limited: Vitals/Constitutional/EENT/Resp/CV/GI//MS/Neuro/Skin/Heme-Lymph-Imm. Pursuant to the emergency declaration under the [address] [place] [address] and the [email or web address] and [employer] Act, this Virtual Visit was conducted with patient's (and/or legal guardian's) consent, to reduce the patient's risk of exposure to COVID-19 and provide necessary medical care. The patient (and/or legal guardian) has also been advised to contact this office for worsening conditions or problems, and seek emergency medical treatment and/or call 911 if deemed necessary. Patient identification was verified at the start of the visit: YES    Services were provided through a video synchronous discussion virtually to substitute for in-person clinic visit. Patient and provider were located at their individual homes. An electronic signature was used to authenticate this note.       Riaz Calero MD

## 2021-07-14 ENCOUNTER — TELEPHONE (OUTPATIENT)
Dept: FAMILY MEDICINE CLINIC | Age: 46
End: 2021-07-14

## 2021-07-16 DIAGNOSIS — F41.9 ANXIETY: ICD-10-CM

## 2021-07-19 RX ORDER — SERTRALINE HYDROCHLORIDE 50 MG/1
TABLET, FILM COATED ORAL
Qty: 90 TABLET | Refills: 0 | Status: SHIPPED | OUTPATIENT
Start: 2021-07-19 | End: 2022-01-31

## 2021-07-23 ENCOUNTER — VIRTUAL VISIT (OUTPATIENT)
Dept: FAMILY MEDICINE CLINIC | Age: 46
End: 2021-07-23
Payer: COMMERCIAL

## 2021-07-23 DIAGNOSIS — G47.00 INSOMNIA, UNSPECIFIED TYPE: ICD-10-CM

## 2021-07-23 DIAGNOSIS — F41.9 ANXIETY: ICD-10-CM

## 2021-07-23 DIAGNOSIS — F32.A DEPRESSION, UNSPECIFIED DEPRESSION TYPE: ICD-10-CM

## 2021-07-23 DIAGNOSIS — F51.3 SLEEP WALKING DISORDER: Primary | ICD-10-CM

## 2021-07-23 PROCEDURE — 99213 OFFICE O/P EST LOW 20 MIN: CPT | Performed by: FAMILY MEDICINE

## 2021-07-23 RX ORDER — TRAZODONE HYDROCHLORIDE 100 MG/1
100 TABLET ORAL
Qty: 60 TABLET | Refills: 2 | Status: SHIPPED | OUTPATIENT
Start: 2021-07-23 | End: 2021-11-10

## 2021-07-23 NOTE — PROGRESS NOTES
Consent: Nelda Cardenas, who was seen by synchronous (real-time) audio-video technology, and/or her healthcare decision maker, is aware that this patient-initiated, Telehealth encounter on 7/23/2021 is a billable service, with coverage as determined by her insurance carrier. She is aware that she may receive a bill and has provided verbal consent to proceed: Yes. Assessment & Plan:   Diagnoses and all orders for this visit:    1. Sleep walking disorder  -     SLEEP MEDICINE REFERRAL    2. Anxiety  -     REFERRAL TO PSYCHIATRY    3. Depression, unspecified depression type  -     REFERRAL TO PSYCHIATRY    4. Insomnia, unspecified type  -     traZODone (DESYREL) 100 mg tablet; Take 1 Tablet by mouth daily as needed for Sleep. Follow-up and Dispositions    · Return in about 2 weeks (around 8/6/2021), or if symptoms worsen or fail to improve, for follow up. I ADVISED PATIENT TO GO TO ER IF SYMPTOMS WORSEN , CHANGE OR FAILS TO IMPROVE. I spent at least 15 minutes with this established patient, and >50% of the time was spent counseling and/or coordinating care regarding SEE BELOW  [id number]  Subjective:   Nelda Cardenas is a 48 y.o. 1975 female  established patient, who was seen for Insomnia          1. Sleep walking disorder  Patient reports she recently started sleepwalking. Her  notified her of this. She does not roam around in the house, she only stays in her room when she is walking and sleep. I discussed with her to decrease dose of trazodone. I will also refer to sleep medicine for further evaluation. 2. Anxiety  3. Depression, unspecified depression type  Patient is currently taking BuSpar and Zoloft for her depression anxiety symptoms. She denies suicidal or homicidal ideation. She reports she still has anxiety and depression symptoms which are not completely controlled with current medications. She feels tense most of the time.   I advised her to make appointment with psychiatrist for further evaluation. We will continue current medications at this time. 4. Insomnia, unspecified type  Her trazodone dose was recently increased. After that she developed sleepwalking. I have advised her to decrease her dose of trazodone to 1 tablet daily. Prior to Admission medications    Medication Sig Start Date End Date Taking? Authorizing Provider   traZODone (DESYREL) 100 mg tablet Take 1 Tablet by mouth daily as needed for Sleep. 7/23/21  Yes Contreras Tam MD   sertraline (ZOLOFT) 50 mg tablet TAKE 1 TABLET BY MOUTH DAILY 7/19/21   Contreras Tam MD   cyclobenzaprine (FLEXERIL) 10 mg tablet Take 1 Tablet by mouth three (3) times daily as needed for Muscle Spasm(s). 7/9/21   Contreras Tam MD   topiramate (TOPAMAX) 50 mg tablet TAKE 1 TABLET BY MOUTH TWO TIMES A DAY FOR MIGRAINE PREVENTION 7/8/21   Contreras Tam MD   pregabalin (LYRICA) 50 mg capsule TAKE 1 CAPSULES BY MOUTH 3 TIMES A DAY 7/7/21   Michelle PENA NP   diclofenac EC (VOLTAREN) 75 mg EC tablet TAKE 1 TABLET BY MOUTH TWO TIMES A DAY 6/11/21   Contreras Tam MD   busPIRone (BUSPAR) 10 mg tablet TAKE 1 TABLET BY MOUTH 3 TIMES A DAY 6/8/21   Contreras Tam MD   traZODone (DESYREL) 100 mg tablet TAKE TWO TABLET BY MOUTH AT BEDTIME 6/8/21 7/23/21  Contreras Tam MD   ondansetron (Zofran ODT) 4 mg disintegrating tablet Take 1 Tab by mouth every eight (8) hours as needed for Nausea, Vomiting or Nausea or Vomiting. 1/20/21   Contreras Tam MD   potassium chloride (K-DUR, KLOR-CON) 20 mEq tablet Take 1 Tab by mouth two (2) times a day. 1/20/21   Contreras Tam MD   pantoprazole (PROTONIX) 40 mg tablet Take 1 Tab by mouth daily. 1/20/21   Contreras Tam MD   famotidine (PEPCID) 40 mg tablet Take 1 Tab by mouth nightly. 1/20/21   Contreras Tam MD   naproxen sodium (ANAPROX) 275 mg tablet Take 1 Tab by mouth two (2) times daily (with meals).  1/6/21   Aleksey Lopez MD   amLODIPine (NORVASC) 10 mg tablet TAKE 1 TABLET BY MOUTH DAILY 12/14/20   Tong Sterling MD   atenoloL (TENORMIN) 50 mg tablet  5/21/20   Provider, Historical     Allergies   Allergen Reactions    Morphine Anaphylaxis     July 5, 2016 administered after surgery for pain.  Bacitracin Other (comments)    Doxycycline Hives    Morphine Unknown (comments)    Other Medication Palpitations     Peripheral Block patient noted difficulty breathing and palpitations.        Patient Active Problem List   Diagnosis Code    SOB (shortness of breath) R06.02    Chronic back pain M54.9, G89.29    Obesity E66.9    Lyme disease A69.20    Anxiety F41.9    Diverticular disease K57.90    GERD (gastroesophageal reflux disease) K21.9    Foot pain, bilateral M79.671, M79.672    Unspecified hereditary and idiopathic peripheral neuropathy G60.9    TMJ tenderness M26.629    Sinusitis J32.9    New daily persistent headache G44.52    Chest pain with normal coronary angiography R07.9    Morbid obesity with BMI of 45.0-49.9, adult ([facility]) E66.01, Z68.42    Elevated pulse rate R00.0    Cellulitis and abscess of neck L03.221, L02.11    Controlled type 2 diabetes mellitus without complication ([facility]) J34.6    Sepsis ([facility]) A41.9    Migraine without status migrainosus, not intractable G43.909    Anxiety F41.9    Gastroparesis K31.84    Other hyperlipidemia E78.49    Controlled type 2 diabetes mellitus without complication, without long-term current use of insulin ([facility]) E11.9    Hearing loss H91.90     Patient Active Problem List    Diagnosis Date Noted    Hearing loss 07/10/2020    Migraine without status migrainosus, not intractable 05/20/2020    Anxiety 05/20/2020    Gastroparesis 05/20/2020    Other hyperlipidemia 05/20/2020    Controlled type 2 diabetes mellitus without complication, without long-term current use of insulin ([facility] 05/20/2020    Cellulitis and abscess of neck 12/03/2016    Controlled type 2 diabetes mellitus without complication ([facility] 49/41/4365    Sepsis ([facility] 12/03/2016    Elevated pulse rate 08/31/2015    Chest pain with normal coronary angiography 07/28/2015    Morbid obesity with BMI of 45.0-49.9, adult ([facility] 07/28/2015    TMJ tenderness 06/01/2015    Sinusitis 06/01/2015    New daily persistent headache 06/01/2015    Foot pain, bilateral 04/08/2015    Unspecified hereditary and idiopathic peripheral neuropathy 04/08/2015    Chronic back pain 03/13/2014    Obesity 03/13/2014    Lyme disease 03/13/2014    Anxiety 03/13/2014    Diverticular disease 03/13/2014    GERD (gastroesophageal reflux disease) 03/13/2014    SOB (shortness of breath) 03/12/2014     Current Outpatient Medications   Medication Sig Dispense Refill    traZODone (DESYREL) 100 mg tablet Take 1 Tablet by mouth daily as needed for Sleep. 60 Tablet 2    sertraline (ZOLOFT) 50 mg tablet TAKE 1 TABLET BY MOUTH DAILY 90 Tablet 0    cyclobenzaprine (FLEXERIL) 10 mg tablet Take 1 Tablet by mouth three (3) times daily as needed for Muscle Spasm(s). 90 Tablet 1    topiramate (TOPAMAX) 50 mg tablet TAKE 1 TABLET BY MOUTH TWO TIMES A DAY FOR MIGRAINE PREVENTION 60 Tablet 2    pregabalin (LYRICA) 50 mg capsule TAKE 1 CAPSULES BY MOUTH 3 TIMES A DAY 90 Capsule 0    diclofenac EC (VOLTAREN) 75 mg EC tablet TAKE 1 TABLET BY MOUTH TWO TIMES A DAY 60 Tablet 4    busPIRone (BUSPAR) 10 mg tablet TAKE 1 TABLET BY MOUTH 3 TIMES A DAY 90 Tablet 2    ondansetron (Zofran ODT) 4 mg disintegrating tablet Take 1 Tab by mouth every eight (8) hours as needed for Nausea, Vomiting or Nausea or Vomiting. 12 Tab 2    potassium chloride (K-DUR, KLOR-CON) 20 mEq tablet Take 1 Tab by mouth two (2) times a day. 30 Tab 0    pantoprazole (PROTONIX) 40 mg tablet Take 1 Tab by mouth daily. 90 Tab 1    famotidine (PEPCID) 40 mg tablet Take 1 Tab by mouth nightly. 90 Tab 1    naproxen sodium (ANAPROX) 275 mg tablet Take 1 Tab by mouth two (2) times daily (with meals).  20 Tab 0    amLODIPine (NORVASC) 10 mg tablet TAKE 1 TABLET BY MOUTH DAILY 90 Tab 1    atenoloL (TENORMIN) 50 mg tablet        Allergies   Allergen Reactions    Morphine Anaphylaxis     2016 administered after surgery for pain.  Bacitracin Other (comments)    Doxycycline Hives    Morphine Unknown (comments)    Other Medication Palpitations     Peripheral Block patient noted difficulty breathing and palpitations. Past Medical History:   Diagnosis Date    Arrhythmia     PAC    Arthritis     Cellulitis     cellulitis    Chest pain     Diabetes ([address]     GERD (gastroesophageal reflux disease)     H/O seasonal allergies     Headache     Hearing loss 7/10/2020    Ill-defined condition     \"twisted\" kidney right    Lyme disease     Obesity     Plantar fasciitis     PUD (peptic ulcer disease)     Trigger finger, right index finger 2016    Vitamin D deficiency      Past Surgical History:   Procedure Laterality Date    HX GASTRIC BYPASS      HX GI      HX HEART CATHETERIZATION  7/28/15    HX ORTHOPAEDIC  7-30-15    Rt. rotator cuff repair     HX TONSILLECTOMY      [place] COLSC FLX W/NDSC US XM RCTM ET AL LMTD&ADJ [facility]       Family History   Problem Relation Age of Onset    Cancer Paternal Aunt         colon polyps    Cancer Paternal Uncle         colon cancer    Stroke Mother     Heart Disease Mother     Elevated Lipids Mother     Cancer Father         prostate    Heart Disease Father         death by MI    Coronary Artery Disease Other         mother  64, father age 67 of MI    Other Brother         Passed while sleeping at 64, neck blockages and legs, hx of vascular surg    Hypertension Brother     Diabetes Brother     Anesth Problems Neg Hx      Social History     Tobacco Use    Smoking status: Current Some Day Smoker     Packs/day: 0.50    Smokeless tobacco: Never Used   Substance Use Topics    Alcohol use: Not Currently     Comment: rare       Review of Systems   Constitutional: Negative. HENT: Negative. Eyes: Negative. Respiratory: Negative. Cardiovascular: Negative. Gastrointestinal: Negative. Genitourinary: Negative. Musculoskeletal: Negative. Skin: Negative. Neurological: Negative. Endo/Heme/Allergies: Negative. Psychiatric/Behavioral: Positive for depression. Negative for suicidal ideas. The patient is nervous/anxious.          Sleepwalking           Objective:     Patient-Reported Vitals 10/15/2020   Patient-Reported Weight 175lb   Patient-Reported Height -        [INSTRUCTIONS:  \"[x]\" Indicates a positive item  \"[]\" Indicates a negative item  -- DELETE ALL ITEMS NOT EXAMINED]    Constitutional: [x] Appears well-developed and well-nourished [x] No apparent distress      [] Abnormal -     Mental status: [x] Alert and awake  [x] Oriented to person/place/time [x] Able to follow commands    [] Abnormal -     Eyes:   EOM    [x]  Normal    [] Abnormal -   Sclera  [x]  Normal    [] Abnormal -          Discharge [x]  None visible   [] Abnormal -     HENT: [x] Normocephalic, atraumatic  [] Abnormal -   [x] Mouth/Throat: Mucous membranes are moist    External Ears [x] Normal  [] Abnormal -    Neck: [x] No visualized mass [] Abnormal -     Pulmonary/Chest: [x] Respiratory effort normal   [x] No visualized signs of difficulty breathing or respiratory distress        [] Abnormal -      Musculoskeletal:   [x] Normal gait with no signs of ataxia         [x] Normal range of motion of neck        [] Abnormal -     Neurological:        [x] No Facial Asymmetry (Cranial nerve 7 motor function) (limited exam due to video visit)          [x] No gaze palsy        [] Abnormal -          Skin:        [x] No significant exanthematous lesions or discoloration noted on facial skin         [] Abnormal -            Psychiatric:       [x] Normal Affect [] Abnormal -        [x] No Hallucinations    Other pertinent observable physical exam findings:-    We discussed the expected course, resolution and complications of the diagnosis(es) in detail. Medication risks, benefits, costs, interactions, and alternatives were discussed as indicated. I advised her to contact the office if her condition worsens, changes or fails to improve as anticipated. She expressed understanding with the diagnosis(es) and plan. Francisco J Peraza is a 48 y.o. female  is being evaluated by a Virtual Visit (video visit) encounter to address concerns as mentioned above. A caregiver was present when appropriate. Due to this being a TeleHealth encounter (During [facility] public health emergency), evaluation of the following organ systems was limited: Vitals/Constitutional/EENT/Resp/CV/GI//MS/Neuro/Skin/Heme-Lymph-Imm. Pursuant to the emergency declaration under the [address] [place] [address] authority and the [employer] Act, this Virtual Visit was conducted with patient's (and/or legal guardian's) consent, to reduce the patient's risk of exposure to COVID-19 and provide necessary medical care. The patient (and/or legal guardian) has also been advised to contact this office for worsening conditions or problems, and seek emergency medical treatment and/or call 911 if deemed necessary. Patient identification was verified at the start of the visit: YES    Services were provided through a video synchronous discussion virtually to substitute for in-person clinic visit. Patient and provider were located at their individual homes. An electronic signature was used to authenticate this note.       Rhoda Littlejohn MD

## 2021-08-09 ENCOUNTER — TELEPHONE (OUTPATIENT)
Dept: FAMILY MEDICINE CLINIC | Age: 46
End: 2021-08-09

## 2021-08-25 ENCOUNTER — TELEPHONE (OUTPATIENT)
Dept: FAMILY MEDICINE CLINIC | Age: 46
End: 2021-08-25

## 2021-08-30 ENCOUNTER — TELEPHONE (OUTPATIENT)
Dept: FAMILY MEDICINE CLINIC | Age: 46
End: 2021-08-30

## 2021-09-07 DIAGNOSIS — G89.29 CHRONIC LEFT-SIDED LOW BACK PAIN WITH LEFT-SIDED SCIATICA: ICD-10-CM

## 2021-09-07 DIAGNOSIS — M54.42 CHRONIC LEFT-SIDED LOW BACK PAIN WITH LEFT-SIDED SCIATICA: ICD-10-CM

## 2021-09-07 DIAGNOSIS — F41.9 ANXIETY: ICD-10-CM

## 2021-09-07 RX ORDER — BUSPIRONE HYDROCHLORIDE 10 MG/1
TABLET ORAL
Qty: 90 TABLET | Refills: 1 | Status: SHIPPED | OUTPATIENT
Start: 2021-09-07 | End: 2021-11-10

## 2021-09-07 RX ORDER — CYCLOBENZAPRINE HCL 10 MG
TABLET ORAL
Qty: 90 TABLET | Refills: 0 | Status: SHIPPED | OUTPATIENT
Start: 2021-09-07 | End: 2021-10-11

## 2021-09-07 RX ORDER — TOPIRAMATE 50 MG/1
TABLET, FILM COATED ORAL
Qty: 60 TABLET | Refills: 1 | Status: SHIPPED | OUTPATIENT
Start: 2021-09-07 | End: 2022-01-31

## 2021-09-23 ENCOUNTER — DOCUMENTATION ONLY (OUTPATIENT)
Dept: SLEEP MEDICINE | Age: 46
End: 2021-09-23

## 2021-10-09 DIAGNOSIS — G89.29 CHRONIC LEFT-SIDED LOW BACK PAIN WITH LEFT-SIDED SCIATICA: ICD-10-CM

## 2021-10-09 DIAGNOSIS — M54.42 CHRONIC LEFT-SIDED LOW BACK PAIN WITH LEFT-SIDED SCIATICA: ICD-10-CM

## 2021-10-11 RX ORDER — DICLOFENAC SODIUM 75 MG/1
TABLET, DELAYED RELEASE ORAL
Qty: 60 TABLET | Refills: 3 | Status: SHIPPED | OUTPATIENT
Start: 2021-10-11 | End: 2022-05-10

## 2021-10-11 RX ORDER — CYCLOBENZAPRINE HCL 10 MG
TABLET ORAL
Qty: 90 TABLET | Refills: 0 | Status: SHIPPED | OUTPATIENT
Start: 2021-10-11 | End: 2022-05-17

## 2021-10-12 ENCOUNTER — PATIENT MESSAGE (OUTPATIENT)
Dept: FAMILY MEDICINE CLINIC | Age: 46
End: 2021-10-12

## 2021-11-10 DIAGNOSIS — I10 ESSENTIAL HYPERTENSION: ICD-10-CM

## 2021-11-10 DIAGNOSIS — F41.9 ANXIETY: ICD-10-CM

## 2021-11-10 DIAGNOSIS — G47.00 INSOMNIA, UNSPECIFIED TYPE: ICD-10-CM

## 2021-11-10 RX ORDER — BUSPIRONE HYDROCHLORIDE 10 MG/1
TABLET ORAL
Qty: 90 TABLET | Refills: 0 | Status: SHIPPED | OUTPATIENT
Start: 2021-11-10 | End: 2022-01-03

## 2021-11-10 RX ORDER — AMLODIPINE BESYLATE 10 MG/1
TABLET ORAL
Qty: 90 TABLET | Refills: 1 | Status: SHIPPED | OUTPATIENT
Start: 2021-11-10 | End: 2022-02-14

## 2021-11-10 RX ORDER — TRAZODONE HYDROCHLORIDE 100 MG/1
100 TABLET ORAL
Qty: 60 TABLET | Refills: 0 | Status: SHIPPED | OUTPATIENT
Start: 2021-11-10

## 2021-11-17 ENCOUNTER — VIRTUAL VISIT (OUTPATIENT)
Dept: FAMILY MEDICINE CLINIC | Age: 46
End: 2021-11-17

## 2021-11-17 ENCOUNTER — PATIENT MESSAGE (OUTPATIENT)
Dept: FAMILY MEDICINE CLINIC | Age: 46
End: 2021-11-17

## 2021-11-17 DIAGNOSIS — M25.552 LEFT HIP PAIN: Primary | ICD-10-CM

## 2021-11-17 PROCEDURE — 99213 OFFICE O/P EST LOW 20 MIN: CPT | Performed by: NURSE PRACTITIONER

## 2021-11-17 NOTE — LETTER
NOTIFICATION RETURN TO WORK     11/17/2021 8:17 AM    Ms. Davie Hurst [id number]      To Whom It May Concern:    Kim Cardona is currently under the care of 1 Jen[address] Walter. She will return to work on: 11/24/21    If there are questions or concerns please have the patient contact our office.         Sincerely,      Gracie Briggs NP

## 2021-11-17 NOTE — PROGRESS NOTES
Jose L Loredo is a 48 y.o. female who was seen by synchronous (real-time) audio-video technology on 11/17/2021 for No chief complaint on file. Assessment & Plan:   Diagnoses and all orders for this visit:    1. Left hip pain  -     XR HIP LT W OR WO PELV 2-3 VWS; Future  -     REFERRAL TO ORTHOPEDICS  - Worsening, likely due to new job and constantly standing and walking on concrete. Patient is currently taking diclofenac 75 mg twice a day, cyclobenzaprine 3 times a day and Lyrica for fibromyalgia she states it is not touching the pain. Will get x-ray today and refer to orthopedics for further evaluation and treatment. Patient needed letter for work she will call back with fax number to fax letter into her work      I spent at least 14 minutes on this visit with this established patient. Subjective:   VV today for left hip for about a week, started new job about a week ago and the job requires a lot of standing and walking on concrete. Worse when standing, unable to sleep on it. Pain described as constant, throbbing pain. Pain rated 9/10. Denies injury to the area. Has missed three days of work    Has fibromyalgia and on lyrica, diclofenac and cyclobenzaprine    Prior to Admission medications    Medication Sig Start Date End Date Taking?  Authorizing Provider   busPIRone (BUSPAR) 10 mg tablet TAKE 1 TABLET BY MOUTH 3 TIMES A DAY 11/10/21   Qing Barrett MD   traZODone (DESYREL) 100 mg tablet TAKE 1 TABLET BY MOUTH DAILY AS NEEDED FOR SLEEP. 11/10/21   Qing Barrett MD   amLODIPine (NORVASC) 10 mg tablet TAKE 1 TABLET BY MOUTH DAILY 11/10/21   Qing Barrett MD   cyclobenzaprine (FLEXERIL) 10 mg tablet TAKE 1 TABLET BY MOUTH 3 TIMES A DAY AS NEEDED FOR MUSCLE SPASMS 10/11/21   Qing Barrett MD   diclofenac EC (VOLTAREN) 75 mg EC tablet TAKE 1 TABLET BY MOUTH TWO TIMES A DAY 10/11/21   Qing Barrett MD   pregabalin (LYRICA) 50 mg capsule TAKE 1 CAPSULES BY MOUTH 3 TIMES A DAY 9/30/21   Marcella Stevens Yee PENA NP   topiramate (TOPAMAX) 50 mg tablet TAKE 1 TABLET BY MOUTH TWO TIMES A DAY FOR MIGRAINE PREVENTION. 9/7/21   Lady [place] List, MD   sertraline (ZOLOFT) 50 mg tablet TAKE 1 TABLET BY MOUTH DAILY 7/19/21   [place] List, MD   ondansetron (Zofran ODT) 4 mg disintegrating tablet Take 1 Tab by mouth every eight (8) hours as needed for Nausea, Vomiting or Nausea or Vomiting. 1/20/21   Lady [place] List, MD   potassium chloride (K-DUR, KLOR-CON) 20 mEq tablet Take 1 Tab by mouth two (2) times a day. 1/20/21   [place] List, MD   pantoprazole (PROTONIX) 40 mg tablet Take 1 Tab by mouth daily. 1/20/21   Lady [place] List, MD   famotidine (PEPCID) 40 mg tablet Take 1 Tab by mouth nightly. 1/20/21   Lady [place] List, MD   atenoloL (TENORMIN) 50 mg tablet  5/21/20   Provider, Historical     Patient Active Problem List   Diagnosis Code    SOB (shortness of breath) R06.02    Chronic back pain M54.9, G89.29    Obesity E66.9    Lyme disease A69.20    Anxiety F41.9    Diverticular disease K57.90    GERD (gastroesophageal reflux disease) K21.9    Foot pain, bilateral M79.671, M79.672    Unspecified hereditary and idiopathic peripheral neuropathy G60.9    TMJ tenderness M26.629    Sinusitis J32.9    New daily persistent headache G44.52    Chest pain with normal coronary angiography R07.9    Morbid obesity with BMI of 45.0-49.9, adult ([facility]) E66.01, Z68.42    Elevated pulse rate R00.0    Cellulitis and abscess of neck L03.221, L02.11    Controlled type 2 diabetes mellitus without complication ([facility]) K02.2    Sepsis ([facility]) A41.9    Migraine without status migrainosus, not intractable G43.909    Anxiety F41.9    Gastroparesis K31.84    Other hyperlipidemia E78.49    Controlled type 2 diabetes mellitus without complication, without long-term current use of insulin ([facility]) E11.9    Hearing loss H91.90       Review of Systems   Constitutional: Negative for chills, fever and malaise/fatigue.    Eyes: Negative for blurred vision. Respiratory: Negative for cough and shortness of breath. Cardiovascular: Negative for chest pain, palpitations and leg swelling. Musculoskeletal: Positive for joint pain. Negative for back pain, falls, myalgias and neck pain. Neurological: Negative for dizziness and headaches. Objective:     Patient-Reported Vitals 10/15/2020   Patient-Reported Weight 175lb   Patient-Reported Height -        [INSTRUCTIONS:  \"[x]\" Indicates a positive item  \"[]\" Indicates a negative item  -- DELETE ALL ITEMS NOT EXAMINED]    Constitutional: [x] Appears well-developed and well-nourished [x] No apparent distress      [] Abnormal -     Mental status: [x] Alert and awake  [x] Oriented to person/place/time [x] Able to follow commands    [] Abnormal -     Eyes:   EOM    [x]  Normal    [] Abnormal -   Sclera  [x]  Normal    [] Abnormal -          Discharge [x]  None visible   [] Abnormal -     HENT: [x] Normocephalic, atraumatic  [] Abnormal -   [x] Mouth/Throat: Mucous membranes are moist    External Ears [x] Normal  [] Abnormal -    Neck: [x] No visualized mass [] Abnormal -     Pulmonary/Chest: [x] Respiratory effort normal   [x] No visualized signs of difficulty breathing or respiratory distress        [] Abnormal -      Musculoskeletal:   [x] Normal gait with no signs of ataxia         [x] Normal range of motion of neck        [] Abnormal -     Neurological:        [x] No Facial Asymmetry (Cranial nerve 7 motor function) (limited exam due to video visit)          [x] No gaze palsy        [] Abnormal -          Skin:        [x] No significant exanthematous lesions or discoloration noted on facial skin         [] Abnormal -            Psychiatric:       [x] Normal Affect [] Abnormal -        [x] No Hallucinations    Other pertinent observable physical exam findings:-        We discussed the expected course, resolution and complications of the diagnosis(es) in detail.   Medication risks, benefits, costs, interactions, and alternatives were discussed as indicated. I advised her to contact the office if her condition worsens, changes or fails to improve as anticipated. She expressed understanding with the diagnosis(es) and plan. Janusz Bautista, was evaluated through a synchronous (real-time) audio-video encounter. The patient (or guardian if applicable) is aware that this is a billable service. Verbal consent to proceed has been obtained within the past 12 months. The visit was conducted pursuant to the emergency declaration under the [address] [place] [address] authority and the [employer] Act. Patient identification was verified, and a caregiver was present when appropriate. The patient was located in a state where the provider was credentialed to provide care.       Angi Ware NP

## 2021-12-02 DIAGNOSIS — M79.7 FIBROMYALGIA: ICD-10-CM

## 2021-12-02 RX ORDER — PREGABALIN 50 MG/1
CAPSULE ORAL
Qty: 90 CAPSULE | Refills: 0 | OUTPATIENT
Start: 2021-12-02

## 2021-12-03 NOTE — TELEPHONE ENCOUNTER
Pt is completely out of medication and has been for over a week. I informed pt that she needs an appointment since she was last seen in 2020. Pt has an appointment for 1/24 but is requesting enough medication to last until next visit.

## 2021-12-05 RX ORDER — PREGABALIN 50 MG/1
CAPSULE ORAL
Qty: 90 CAPSULE | Refills: 0 | Status: SHIPPED | OUTPATIENT
Start: 2021-12-05

## 2021-12-16 DIAGNOSIS — F41.9 ANXIETY: ICD-10-CM

## 2022-01-03 RX ORDER — BUSPIRONE HYDROCHLORIDE 10 MG/1
TABLET ORAL
Qty: 90 TABLET | Refills: 0 | Status: SHIPPED | OUTPATIENT
Start: 2022-01-03 | End: 2022-03-14

## 2022-01-31 DIAGNOSIS — F41.9 ANXIETY: ICD-10-CM

## 2022-01-31 RX ORDER — TOPIRAMATE 50 MG/1
TABLET, FILM COATED ORAL
Qty: 60 TABLET | Refills: 0 | Status: SHIPPED | OUTPATIENT
Start: 2022-01-31 | End: 2022-03-14

## 2022-01-31 RX ORDER — SERTRALINE HYDROCHLORIDE 50 MG/1
TABLET, FILM COATED ORAL
Qty: 90 TABLET | Refills: 0 | Status: SHIPPED | OUTPATIENT
Start: 2022-01-31 | End: 2022-05-17

## 2022-02-13 DIAGNOSIS — I10 ESSENTIAL HYPERTENSION: ICD-10-CM

## 2022-02-14 RX ORDER — AMLODIPINE BESYLATE 10 MG/1
TABLET ORAL
Qty: 90 TABLET | Refills: 1 | Status: SHIPPED | OUTPATIENT
Start: 2022-02-14 | End: 2022-03-14

## 2022-02-28 ENCOUNTER — PATIENT MESSAGE (OUTPATIENT)
Dept: FAMILY MEDICINE CLINIC | Age: 47
End: 2022-02-28

## 2022-03-03 ENCOUNTER — TELEPHONE (OUTPATIENT)
Dept: FAMILY MEDICINE CLINIC | Age: 47
End: 2022-03-03

## 2022-03-14 DIAGNOSIS — F41.9 ANXIETY: ICD-10-CM

## 2022-03-14 DIAGNOSIS — I10 ESSENTIAL HYPERTENSION: ICD-10-CM

## 2022-03-14 RX ORDER — AMLODIPINE BESYLATE 10 MG/1
TABLET ORAL
Qty: 90 TABLET | Refills: 2 | Status: SHIPPED | OUTPATIENT
Start: 2022-03-14 | End: 2022-05-11

## 2022-03-14 RX ORDER — TOPIRAMATE 50 MG/1
TABLET, FILM COATED ORAL
Qty: 60 TABLET | Refills: 0 | Status: SHIPPED | OUTPATIENT
Start: 2022-03-14 | End: 2022-04-12

## 2022-03-14 RX ORDER — BUSPIRONE HYDROCHLORIDE 10 MG/1
TABLET ORAL
Qty: 90 TABLET | Refills: 0 | Status: SHIPPED | OUTPATIENT
Start: 2022-03-14 | End: 2022-04-12

## 2022-03-18 PROBLEM — E78.49 OTHER HYPERLIPIDEMIA: Status: ACTIVE | Noted: 2020-05-20

## 2022-03-18 PROBLEM — F41.9 ANXIETY: Status: ACTIVE | Noted: 2020-05-20

## 2022-03-19 PROBLEM — E11.9 CONTROLLED TYPE 2 DIABETES MELLITUS WITHOUT COMPLICATION, WITHOUT LONG-TERM CURRENT USE OF INSULIN (HCC): Status: ACTIVE | Noted: 2020-05-20

## 2022-03-19 PROBLEM — G43.909 MIGRAINE WITHOUT STATUS MIGRAINOSUS, NOT INTRACTABLE: Status: ACTIVE | Noted: 2020-05-20

## 2022-03-19 PROBLEM — K31.84 GASTROPARESIS: Status: ACTIVE | Noted: 2020-05-20

## 2022-03-20 PROBLEM — H91.90 HEARING LOSS: Status: ACTIVE | Noted: 2020-07-10

## 2022-04-12 DIAGNOSIS — F41.9 ANXIETY: ICD-10-CM

## 2022-04-12 RX ORDER — TOPIRAMATE 50 MG/1
TABLET, FILM COATED ORAL
Qty: 60 TABLET | Refills: 0 | Status: SHIPPED | OUTPATIENT
Start: 2022-04-12 | End: 2022-05-10

## 2022-04-12 RX ORDER — BUSPIRONE HYDROCHLORIDE 10 MG/1
TABLET ORAL
Qty: 90 TABLET | Refills: 0 | Status: SHIPPED | OUTPATIENT
Start: 2022-04-12 | End: 2022-05-10

## 2022-05-09 DIAGNOSIS — F41.9 ANXIETY: ICD-10-CM

## 2022-05-10 RX ORDER — TOPIRAMATE 50 MG/1
TABLET, FILM COATED ORAL
Qty: 60 TABLET | Refills: 0 | Status: SHIPPED | OUTPATIENT
Start: 2022-05-10

## 2022-05-10 RX ORDER — DICLOFENAC SODIUM 75 MG/1
TABLET, DELAYED RELEASE ORAL
Qty: 60 TABLET | Refills: 3 | Status: SHIPPED | OUTPATIENT
Start: 2022-05-10 | End: 2022-09-19 | Stop reason: SDUPTHER

## 2022-05-10 RX ORDER — BUSPIRONE HYDROCHLORIDE 10 MG/1
TABLET ORAL
Qty: 90 TABLET | Refills: 0 | Status: SHIPPED | OUTPATIENT
Start: 2022-05-10 | End: 2022-06-27

## 2022-05-11 DIAGNOSIS — I10 ESSENTIAL HYPERTENSION: ICD-10-CM

## 2022-05-11 RX ORDER — AMLODIPINE BESYLATE 10 MG/1
TABLET ORAL
Qty: 90 TABLET | Refills: 4 | Status: SHIPPED | OUTPATIENT
Start: 2022-05-11

## 2022-05-14 DIAGNOSIS — G89.29 CHRONIC LEFT-SIDED LOW BACK PAIN WITH LEFT-SIDED SCIATICA: ICD-10-CM

## 2022-05-14 DIAGNOSIS — F41.9 ANXIETY: ICD-10-CM

## 2022-05-14 DIAGNOSIS — M54.42 CHRONIC LEFT-SIDED LOW BACK PAIN WITH LEFT-SIDED SCIATICA: ICD-10-CM

## 2022-05-17 RX ORDER — SERTRALINE HYDROCHLORIDE 50 MG/1
TABLET, FILM COATED ORAL
Qty: 90 TABLET | Refills: 0 | Status: SHIPPED | OUTPATIENT
Start: 2022-05-17 | End: 2022-09-19 | Stop reason: SDUPTHER

## 2022-05-17 RX ORDER — CYCLOBENZAPRINE HCL 10 MG
TABLET ORAL
Qty: 90 TABLET | Refills: 0 | Status: SHIPPED | OUTPATIENT
Start: 2022-05-17 | End: 2022-09-26

## 2022-05-18 ENCOUNTER — TELEPHONE (OUTPATIENT)
Dept: FAMILY MEDICINE CLINIC | Age: 47
End: 2022-05-18

## 2022-05-18 NOTE — TELEPHONE ENCOUNTER
Left a message on her voice mail to return my call. In reference to her request for Omeprazole. It is not on her meds list but Pantoprazole is. She needs to schedule an appointment since this medication was last prescribed on 01/20/21.

## 2022-06-17 ENCOUNTER — TELEPHONE (OUTPATIENT)
Dept: FAMILY MEDICINE CLINIC | Age: 47
End: 2022-06-17

## 2022-06-24 ENCOUNTER — HOSPITAL ENCOUNTER (EMERGENCY)
Age: 47
Discharge: HOME | End: 2022-06-24
Attending: EMERGENCY MEDICINE
Payer: COMMERCIAL

## 2022-06-24 VITALS
SYSTOLIC BLOOD PRESSURE: 130 MMHG | WEIGHT: 160 LBS | HEIGHT: 64 IN | BODY MASS INDEX: 27.31 KG/M2 | TEMPERATURE: 98.1 F | DIASTOLIC BLOOD PRESSURE: 89 MMHG | RESPIRATION RATE: 17 BRPM | OXYGEN SATURATION: 99 % | HEART RATE: 84 BPM

## 2022-06-24 DIAGNOSIS — H92.01 RIGHT EAR PAIN: Primary | ICD-10-CM

## 2022-06-24 DIAGNOSIS — F41.9 ANXIETY: ICD-10-CM

## 2022-06-24 PROCEDURE — 99282 EMERGENCY DEPT VISIT SF MDM: CPT

## 2022-06-24 NOTE — ED TRIAGE NOTES
Pt reports right ear pain for 2 weeks. Pt reports there is a white bump in her ear that hurts and has gotten worse.

## 2022-06-24 NOTE — ED PROVIDER NOTES
EMERGENCY DEPARTMENT HISTORY AND PHYSICAL EXAM      Date: 6/24/2022  Patient Name: Eduar Alvarez    History of Presenting Illness     Chief Complaint   Patient presents with    Ear Pain     abscess in ear       History Provided By: Patient    HPI: Eduar Alvarez, 50 y.o. female with a past medical history significant diabetes and Fibromyalgia presents to the ED with cc of right ear pain. Patient reports symptoms have been ongoing over the course of the last 1-1/2 weeks. She states that daughters saw a small sore on the auricle of her right ear. Patient notes no inciting event. She has been applying hydrogen peroxide as well as alcohol to the area. States that she was unable to go to work today due to pain. There are no other complaints, changes, or physical findings at this time. PCP: Anita Blank MD    No current facility-administered medications on file prior to encounter. Current Outpatient Medications on File Prior to Encounter   Medication Sig Dispense Refill    sertraline (ZOLOFT) 50 mg tablet TAKE 1 TABLET BY MOUTH DAILY 90 Tablet 0    cyclobenzaprine (FLEXERIL) 10 mg tablet TAKE 1 TABLET BY MOUTH 3 TIMES A DAY AS NEEDED FOR MUSCLE SPASMS. 90 Tablet 0    amLODIPine (NORVASC) 10 mg tablet TAKE 1 TABLET BY MOUTH DAILY 90 Tablet 4    busPIRone (BUSPAR) 10 mg tablet TAKE 1 TABLET BY MOUTH THREE TIMES A DAY 90 Tablet 0    topiramate (TOPAMAX) 50 mg tablet TAKE 1 TABLET BY MOUTH TWICE A DAY FOR MIGRAINE PREVENTION 60 Tablet 0    diclofenac EC (VOLTAREN) 75 mg EC tablet TAKE 1 TABLET BY MOUTH TWO TIMES A DAY 60 Tablet 3    pregabalin (LYRICA) 50 mg capsule TAKE 1 CAPSULES BY MOUTH 3 TIMES A DAY 90 Capsule 0    traZODone (DESYREL) 100 mg tablet TAKE 1 TABLET BY MOUTH DAILY AS NEEDED FOR SLEEP. 60 Tablet 0    ondansetron (Zofran ODT) 4 mg disintegrating tablet Take 1 Tab by mouth every eight (8) hours as needed for Nausea, Vomiting or Nausea or Vomiting.  12 Tab 2    potassium chloride (K-DUR, KLOR-CON) 20 mEq tablet Take 1 Tab by mouth two (2) times a day. 30 Tab 0    pantoprazole (PROTONIX) 40 mg tablet Take 1 Tab by mouth daily. 90 Tab 1    famotidine (PEPCID) 40 mg tablet Take 1 Tab by mouth nightly.  90 Tab 1    atenoloL (TENORMIN) 50 mg tablet          Past History     Past Medical History:  Past Medical History:   Diagnosis Date    Arrhythmia     PAC    Arthritis     Cellulitis     cellulitis    Chest pain     Diabetes (HCC)     GERD (gastroesophageal reflux disease)     H/O seasonal allergies     Headache     Hearing loss 7/10/2020    Ill-defined condition     \"twisted\" kidney right    Lyme disease     Obesity     Plantar fasciitis     PUD (peptic ulcer disease)     Trigger finger, right index finger 2016    Vitamin D deficiency        Past Surgical History:  Past Surgical History:   Procedure Laterality Date    HX GASTRIC BYPASS      HX GI      HX HEART CATHETERIZATION  7/28/15    HX ORTHOPAEDIC  7-30-15    Rt. rotator cuff repair     HX TONSILLECTOMY      MA COLSC FLX W/NDSC US XM RCTM ET AL LMTD&ADJ [facility]         Family History:  Family History   Problem Relation Age of Onset    Cancer Paternal Aunt         colon polyps    Cancer Paternal Uncle         colon cancer    Stroke Mother     Heart Disease Mother     Elevated Lipids Mother     Cancer Father         prostate    Heart Disease Father         death by MI    Coronary Art Dis Other         mother  64, father age 67 of MI    Other Brother         Passed while sleeping at 64, neck blockages and legs, hx of vascular surg    Hypertension Brother     Diabetes Brother     Anesth Problems Neg Hx        Social History:  Social History     Tobacco Use    Smoking status: Current Some Day Smoker     Packs/day: 0.50    Smokeless tobacco: Never Used   Vaping Use    Vaping Use: Never used   Substance Use Topics    Alcohol use: Not Currently     Comment: rare    Drug use: Never Allergies: Allergies   Allergen Reactions    Morphine Anaphylaxis     July 5, 2016 administered after surgery for pain.  Bacitracin Other (comments)    Doxycycline Hives    Morphine Unknown (comments)    Other Medication Palpitations     Peripheral Block patient noted difficulty breathing and palpitations. Review of Systems     Review of Systems   Constitutional: Negative for chills and fever. HENT: Positive for ear pain. Negative for congestion and sore throat. Eyes: Negative for pain and visual disturbance. Respiratory: Negative for cough and shortness of breath. Cardiovascular: Negative for chest pain and palpitations. Gastrointestinal: Negative for constipation, diarrhea, nausea and vomiting. Genitourinary: Negative for dysuria and frequency. Musculoskeletal: Negative for arthralgias and myalgias. Skin: Negative for color change and rash. Neurological: Negative for dizziness, weakness, light-headedness and headaches. Psychiatric/Behavioral: Negative for dysphoric mood and sleep disturbance. Physical Exam     Physical Exam  Constitutional:       Appearance: Normal appearance. HENT:      Head: Normocephalic and atraumatic. Right Ear: Hearing, tympanic membrane and ear canal normal.      Left Ear: Hearing, tympanic membrane, ear canal and external ear normal.      Ears:        Nose: Nose normal.      Mouth/Throat:      Mouth: Mucous membranes are moist.   Eyes:      Extraocular Movements: Extraocular movements intact. Conjunctiva/sclera: Conjunctivae normal.   Cardiovascular:      Rate and Rhythm: Normal rate and regular rhythm. Pulses: Normal pulses. Heart sounds: Normal heart sounds. Pulmonary:      Effort: Pulmonary effort is normal.      Breath sounds: Normal breath sounds. Abdominal:      General: Abdomen is flat. There is no distension. Palpations: Abdomen is soft. Tenderness: There is no abdominal tenderness. Musculoskeletal:         General: Normal range of motion. Cervical back: Normal range of motion. Skin:     General: Skin is warm and dry. Capillary Refill: Capillary refill takes less than 2 seconds. Neurological:      General: No focal deficit present. Mental Status: She is alert and oriented to person, place, and time. Mental status is at baseline. Psychiatric:         Mood and Affect: Mood normal.         Behavior: Behavior normal.         Lab and Diagnostic Study Results     Labs -   No results found for this or any previous visit (from the past 12 hour(s)). Radiologic Studies -   @lastxrresult@  CT Results  (Last 48 hours)    None        CXR Results  (Last 48 hours)    None            Medical Decision Making   - I am the first provider for this patient. - I reviewed the vital signs, available nursing notes, past medical history, past surgical history, family history and social history. - Initial assessment performed. The patients presenting problems have been discussed, and they are in agreement with the care plan formulated and outlined with them. I have encouraged them to ask questions as they arise throughout their visit. Vital Signs-Reviewed the patient's vital signs. Patient Vitals for the past 12 hrs:   Temp Pulse Resp BP SpO2   06/24/22 0632 98.1 °F (36.7 °C) 84 17 130/89 99 %       Records Reviewed: Nursing Notes    The patient presents with ear sore with a differential diagnosis of OM, otitis externa, mastoiditis, insect bite, mott      ED Course:          Provider Notes (Medical Decision Making):   52-year-old female presenting to the emergency department for evaluation of right ear pain x1-1/2 weeks. On exam, patient with small scab to the right ear without evidence of infection, abscess, mastoiditis. External ear canal without erythema or stenosis. TM clear without bulging, retraction, erythema or effusion.   Patient is stable for discharge home at this time.  Provided ENT follow-up information at time of discharge. Work note provided per patient request.  MDM       Procedures   Medical Decision Makingedical Decision Making  Performed by: Veronica Oh DO  PROCEDURES:  Procedures       Disposition   Disposition: Condition stable  DC- Adult Discharges: All of the diagnostic tests were reviewed and questions answered. Diagnosis, care plan and treatment options were discussed. The patient understands the instructions and will follow up as directed. The patients results have been reviewed with them. They have been counseled regarding their diagnosis. The patient verbally convey understanding and agreement of the signs, symptoms, diagnosis, treatment and prognosis and additionally agrees to follow up as recommended with their PCP in 24 - 48 hours. They also agree with the care-plan and convey that all of their questions have been answered. I have also put together some discharge instructions for them that include: 1) educational information regarding their diagnosis, 2) how to care for their diagnosis at home, as well a 3) list of reasons why they would want to return to the ED prior to their follow-up appointment, should their condition change. DC-The patient was given verbal follow-up instructions    Discharged    DISCHARGE PLAN:  1. Current Discharge Medication List      CONTINUE these medications which have NOT CHANGED    Details   sertraline (ZOLOFT) 50 mg tablet TAKE 1 TABLET BY MOUTH DAILY  Qty: 90 Tablet, Refills: 0    Associated Diagnoses: Anxiety      cyclobenzaprine (FLEXERIL) 10 mg tablet TAKE 1 TABLET BY MOUTH 3 TIMES A DAY AS NEEDED FOR MUSCLE SPASMS.   Qty: 90 Tablet, Refills: 0    Associated Diagnoses: Chronic left-sided low back pain with left-sided sciatica      amLODIPine (NORVASC) 10 mg tablet TAKE 1 TABLET BY MOUTH DAILY  Qty: 90 Tablet, Refills: 4    Associated Diagnoses: Essential hypertension      busPIRone (BUSPAR) 10 mg tablet TAKE 1 TABLET BY MOUTH THREE TIMES A DAY  Qty: 90 Tablet, Refills: 0    Associated Diagnoses: Anxiety      topiramate (TOPAMAX) 50 mg tablet TAKE 1 TABLET BY MOUTH TWICE A DAY FOR MIGRAINE PREVENTION  Qty: 60 Tablet, Refills: 0      diclofenac EC (VOLTAREN) 75 mg EC tablet TAKE 1 TABLET BY MOUTH TWO TIMES A DAY  Qty: 60 Tablet, Refills: 3      pregabalin (LYRICA) 50 mg capsule TAKE 1 CAPSULES BY MOUTH 3 TIMES A DAY  Qty: 90 Capsule, Refills: 0    Comments: Follow up needed: LOV 11/2020  Associated Diagnoses: Fibromyalgia      traZODone (DESYREL) 100 mg tablet TAKE 1 TABLET BY MOUTH DAILY AS NEEDED FOR SLEEP. Qty: 60 Tablet, Refills: 0    Associated Diagnoses: Insomnia, unspecified type      ondansetron (Zofran ODT) 4 mg disintegrating tablet Take 1 Tab by mouth every eight (8) hours as needed for Nausea, Vomiting or Nausea or Vomiting. Qty: 12 Tab, Refills: 2    Associated Diagnoses: Non-intractable vomiting with nausea, unspecified vomiting type      potassium chloride (K-DUR, KLOR-CON) 20 mEq tablet Take 1 Tab by mouth two (2) times a day. Qty: 30 Tab, Refills: 0    Associated Diagnoses: Hypokalemia      pantoprazole (PROTONIX) 40 mg tablet Take 1 Tab by mouth daily. Qty: 90 Tab, Refills: 1    Associated Diagnoses: Gastroesophageal reflux disease with esophagitis without hemorrhage      famotidine (PEPCID) 40 mg tablet Take 1 Tab by mouth nightly.   Qty: 90 Tab, Refills: 1    Associated Diagnoses: Gastroesophageal reflux disease with esophagitis without hemorrhage      atenoloL (TENORMIN) 50 mg tablet            2.   Follow-up Information     Follow up With Specialties Details Why Contact Info    [facility] Emergency Medicine  As needed, If symptoms worsen [address] [place] [id number] [phone]    Jinny Prakash MD Otolaryngology Schedule an appointment as soon as possible for a visit  As needed [address]  [phone] 3.  Return to ED if worse   4. Current Discharge Medication List            Diagnosis     Clinical Impression:   1. Right ear pain        Attestations:    Liseth Maldonado, DO    Please note that this dictation was completed with STWA, the computer voice recognition software. Quite often unanticipated grammatical, syntax, homophones, and other interpretive errors are inadvertently transcribed by the computer software. Please disregard these errors. Please excuse any errors that have escaped final proofreading. Thank you.

## 2022-06-24 NOTE — Clinical Note
[address] [facility] [address]  [phone]    Work/School Note    Date: 6/24/2022    To Whom It May concern:      Jennifer Marx was seen and treated today in the emergency room by the following provider(s):  Attending Provider: Nicole Bowser DO. Jennifer Marx is excused from work/school on 06/24/22. She is clear to return to work/school on 06/25/22.         Sincerely,          George Wu DO

## 2022-06-24 NOTE — Clinical Note
[address] [facility] [id number] [phone]    Work/School Note    Date: 6/24/2022    To Whom It May concern:      Bashir Delgadillo was seen and treated today in the emergency room by the following provider(s):  Attending Provider: Afsaneh Guerra DO. Bashir Delgadillo is excused from work/school on 06/24/22. She is clear to return to work/school on 06/25/22.         Sincerely,          Abdulaziz Bruner

## 2022-06-27 RX ORDER — BUSPIRONE HYDROCHLORIDE 10 MG/1
TABLET ORAL
Qty: 90 TABLET | Refills: 0 | Status: SHIPPED | OUTPATIENT
Start: 2022-06-27

## 2022-07-18 ENCOUNTER — PATIENT MESSAGE (OUTPATIENT)
Dept: FAMILY MEDICINE CLINIC | Age: 47
End: 2022-07-18

## 2022-08-15 DIAGNOSIS — M54.42 CHRONIC LEFT-SIDED LOW BACK PAIN WITH LEFT-SIDED SCIATICA: ICD-10-CM

## 2022-08-15 DIAGNOSIS — G89.29 CHRONIC LEFT-SIDED LOW BACK PAIN WITH LEFT-SIDED SCIATICA: ICD-10-CM

## 2022-08-15 RX ORDER — CYCLOBENZAPRINE HCL 10 MG
TABLET ORAL
Qty: 90 TABLET | Refills: 0 | OUTPATIENT
Start: 2022-08-15

## 2022-09-19 ENCOUNTER — VIRTUAL VISIT (OUTPATIENT)
Dept: FAMILY MEDICINE CLINIC | Age: 47
End: 2022-09-19
Payer: COMMERCIAL

## 2022-09-19 ENCOUNTER — TELEPHONE (OUTPATIENT)
Dept: FAMILY MEDICINE CLINIC | Age: 47
End: 2022-09-19

## 2022-09-19 DIAGNOSIS — E78.2 MIXED HYPERLIPIDEMIA: ICD-10-CM

## 2022-09-19 DIAGNOSIS — M54.9 CHRONIC UPPER BACK PAIN: ICD-10-CM

## 2022-09-19 DIAGNOSIS — R73.01 IFG (IMPAIRED FASTING GLUCOSE): ICD-10-CM

## 2022-09-19 DIAGNOSIS — M54.50 CHRONIC MIDLINE LOW BACK PAIN WITHOUT SCIATICA: ICD-10-CM

## 2022-09-19 DIAGNOSIS — M25.552 HIP PAIN, BILATERAL: Primary | ICD-10-CM

## 2022-09-19 DIAGNOSIS — Z13.21 ENCOUNTER FOR VITAMIN DEFICIENCY SCREENING: ICD-10-CM

## 2022-09-19 DIAGNOSIS — Z12.11 SCREENING FOR MALIGNANT NEOPLASM OF COLON: ICD-10-CM

## 2022-09-19 DIAGNOSIS — Z11.59 ENCOUNTER FOR HEPATITIS C SCREENING TEST FOR LOW RISK PATIENT: ICD-10-CM

## 2022-09-19 DIAGNOSIS — G89.29 CHRONIC MIDLINE LOW BACK PAIN WITHOUT SCIATICA: ICD-10-CM

## 2022-09-19 DIAGNOSIS — M25.551 HIP PAIN, BILATERAL: Primary | ICD-10-CM

## 2022-09-19 DIAGNOSIS — Z12.31 SCREENING MAMMOGRAM, ENCOUNTER FOR: ICD-10-CM

## 2022-09-19 DIAGNOSIS — G89.29 CHRONIC UPPER BACK PAIN: ICD-10-CM

## 2022-09-19 DIAGNOSIS — I10 ESSENTIAL HYPERTENSION: ICD-10-CM

## 2022-09-19 DIAGNOSIS — F41.9 ANXIETY: ICD-10-CM

## 2022-09-19 PROCEDURE — 99214 OFFICE O/P EST MOD 30 MIN: CPT | Performed by: FAMILY MEDICINE

## 2022-09-19 RX ORDER — SERTRALINE HYDROCHLORIDE 100 MG/1
100 TABLET, FILM COATED ORAL DAILY
Qty: 90 TABLET | Refills: 0 | Status: SHIPPED | OUTPATIENT
Start: 2022-09-19 | End: 2022-10-18 | Stop reason: SDUPTHER

## 2022-09-19 RX ORDER — DICLOFENAC SODIUM 75 MG/1
75 TABLET, DELAYED RELEASE ORAL
Qty: 60 TABLET | Refills: 0 | Status: SHIPPED | OUTPATIENT
Start: 2022-09-19 | End: 2022-10-19

## 2022-09-19 NOTE — PROGRESS NOTES
1. Have you been to the ER, urgent care clinic since your last visit? Hospitalized since your last visit? No    2. Have you seen or consulted any other health care providers outside of the [address] [place] [address] since your last visit? Include any pap smears or colon screening.  No    Chief Complaint   Patient presents with    Medication Refill

## 2022-09-19 NOTE — PROGRESS NOTES
Consent: Anupama Maldonado, who was seen by synchronous (real-time) audio-video technology, and/or her healthcare decision maker, is aware that this patient-initiated, Telehealth encounter on 9/19/2022 is a billable service, with coverage as determined by her insurance carrier. She is aware that she may receive a bill and has provided verbal consent to proceed: Yes. Assessment & Plan:   Diagnoses and all orders for this visit:    1. Hip pain, bilateral  -     diclofenac EC (VOLTAREN) 75 mg EC tablet; Take 1 Tablet by mouth two (2) times daily as needed for Pain. 2. Anxiety  -     sertraline (ZOLOFT) 100 mg tablet; Take 1 Tablet by mouth daily.  -     THYROID CASCADE PROFILE; Future  -     URINALYSIS W/ REFLEX CULTURE; Future  -     MICROALBUMIN, UR, RAND W/ MICROALB/CREAT RATIO; Future  -     CBC WITH AUTOMATED DIFF; Future  -     METABOLIC PANEL, COMPREHENSIVE; Future    3. Chronic midline low back pain without sciatica  -     diclofenac EC (VOLTAREN) 75 mg EC tablet; Take 1 Tablet by mouth two (2) times daily as needed for Pain.  -     REFERRAL TO ORTHOPEDICS    4. Chronic upper back pain  -     diclofenac EC (VOLTAREN) 75 mg EC tablet; Take 1 Tablet by mouth two (2) times daily as needed for Pain.  -     REFERRAL TO ORTHOPEDICS    5. Screening for malignant neoplasm of colon  -     REFERRAL TO GASTROENTEROLOGY    6. Encounter for hepatitis C screening test for low risk patient  -     HEPATITIS C AB; Future    7. Mixed hyperlipidemia  -     THYROID CASCADE PROFILE; Future  -     METABOLIC PANEL, COMPREHENSIVE; Future  -     LIPID PANEL; Future    8. IFG (impaired fasting glucose)  -     HEMOGLOBIN A1C WITH EAG; Future  -     MICROALBUMIN, UR, RAND W/ MICROALB/CREAT RATIO; Future  -     CBC WITH AUTOMATED DIFF; Future    9. Encounter for vitamin deficiency screening  -     VITAMIN D, 25 HYDROXY; Future    10. Essential hypertension  -     THYROID CASCADE PROFILE;  Future  -     URINALYSIS W/ REFLEX CULTURE; Future  -     MICROALBUMIN, UR, RAND W/ MICROALB/CREAT RATIO; Future  -     METABOLIC PANEL, COMPREHENSIVE; Future    11. Screening mammogram, encounter for  -     [facility] MAMMO BI SCREENING INCL CAD; Future          Follow-up and Dispositions    Return in about 2 weeks (around 10/3/2022) for follow up, labs, discuss labs. I ADVISED PATIENT TO GO TO ER IF SYMPTOMS WORSEN , CHANGE OR FAILS TO IMPROVE. I spent at least 15 minutes with this established patient, and >50% of the time was spent counseling and/or coordinating care regarding SEE BELOW  712  Subjective:   Miki Lesches is a 50 y.o. 1975 female  established patient, who was seen for Medication Refill          1. Anxiety  Requesting refills of Zoloft. Reports anxiety is currently not controlled with current dose. Currently taking Zoloft 50 mg daily in addition to BuSpar. I will increase dose to 200 mg daily for Zoloft. 2. Hip pain, bilateral  Advised patient to follow-up with her Ortho doctor. She has had x-rays done at [address] [place] [address]. Patient is to follow-up with Ortho. 3. Chronic midline low back pain without sciatica  Patient was previously seen by Dr. Zelalem Hobson at [address] [place] [address]. MRI of spine was ordered which was never done. I have advised patient to make follow-up appointment. Patient wants another referral for second opinion. Referral placed in chart. 4. Chronic upper back pain  She is requesting refills of diclofenac. I have advised her to make appointment with Ortho. 5. Screening for malignant neoplasm of colon  Not up-to-date on colonoscopy    6. Encounter for hepatitis C screening test for low risk patient  Check labs  7. Mixed hyperlipidemia  Check cholesterol    8. IFG (impaired fasting glucose)  Patient has history of being diabetic when she was overweight. She lost weight and is currently doing lab monitoring for HbA1c    9. Encounter for vitamin deficiency screening  Check vitamin D level     10.  Essential hypertension  Reports blood pressure is within goal.  Taking blood pressure medication without side effect. Check labs    11. Screening mammogram, encounter for    Advised to schedule mammogram.  Not up-to-date. Prior to Admission medications    Medication Sig Start Date End Date Taking? Authorizing Provider   sertraline (ZOLOFT) 100 mg tablet Take 1 Tablet by mouth daily. 9/19/22  Yes Benton Carvalho MD   diclofenac EC (VOLTAREN) 75 mg EC tablet Take 1 Tablet by mouth two (2) times daily as needed for Pain. 9/19/22  Yes Benton Carvalho MD   busPIRone (BUSPAR) 10 mg tablet TAKE 1 TABLET BY MOUTH THREE TIMES A DAY 6/27/22  Yes Benton Carvalho MD   cyclobenzaprine (FLEXERIL) 10 mg tablet TAKE 1 TABLET BY MOUTH 3 TIMES A DAY AS NEEDED FOR MUSCLE SPASMS. 5/17/22  Yes Benton Carvalho MD   amLODIPine (NORVASC) 10 mg tablet TAKE 1 TABLET BY MOUTH DAILY 5/11/22  Yes Benton Carvalho MD   topiramate (TOPAMAX) 50 mg tablet TAKE 1 TABLET BY MOUTH TWICE A DAY FOR MIGRAINE PREVENTION 5/10/22  Yes Benton Carvalho MD   pregabalin (LYRICA) 50 mg capsule TAKE 1 CAPSULES BY MOUTH 3 TIMES A DAY 12/5/21  Yes Kris Packer NP   traZODone (DESYREL) 100 mg tablet TAKE 1 TABLET BY MOUTH DAILY AS NEEDED FOR SLEEP. 11/10/21  Yes Benton Carvalho MD   ondansetron (Zofran ODT) 4 mg disintegrating tablet Take 1 Tab by mouth every eight (8) hours as needed for Nausea, Vomiting or Nausea or Vomiting. 1/20/21  Yes Benton Carvalho MD   potassium chloride (K-DUR, KLOR-CON) 20 mEq tablet Take 1 Tab by mouth two (2) times a day. 1/20/21  Yes Benton Carvalho MD   pantoprazole (PROTONIX) 40 mg tablet Take 1 Tab by mouth daily. 1/20/21  Yes Benton Carvalho MD   famotidine (PEPCID) 40 mg tablet Take 1 Tab by mouth nightly.  1/20/21  Yes Benton Carvalho MD   atenoloL (TENORMIN) 50 mg tablet  5/21/20  Yes Provider, Historical   sertraline (ZOLOFT) 50 mg tablet TAKE 1 TABLET BY MOUTH DAILY 5/17/22 9/19/22  Benton Carvalho MD   diclofenac EC (VOLTAREN) 75 mg EC tablet TAKE 1 TABLET BY MOUTH TWO TIMES A DAY 5/10/22 9/19/22  Kassandra Paul MD     Allergies   Allergen Reactions    Morphine Anaphylaxis     July 5, 2016 administered after surgery for pain. Bacitracin Other (comments)    Doxycycline Hives    Morphine Unknown (comments)    Other Medication Palpitations     Peripheral Block patient noted difficulty breathing and palpitations.        Patient Active Problem List   Diagnosis Code    SOB (shortness of breath) R06.02    Chronic back pain M54.9, G89.29    Obesity E66.9    Lyme disease A69.20    Anxiety F41.9    Diverticular disease K57.90    GERD (gastroesophageal reflux disease) K21.9    Foot pain, bilateral M79.671, M79.672    Unspecified hereditary and idiopathic peripheral neuropathy G60.9    TMJ tenderness M26.629    Sinusitis J32.9    New daily persistent headache G44.52    Chest pain with normal coronary angiography R07.9    Morbid obesity with BMI of 45.0-49.9, adult ([facility]) E66.01, Z68.42    Elevated pulse rate R00.0    Cellulitis and abscess of neck L03.221, L02.11    Controlled type 2 diabetes mellitus without complication ([facility]) F84.7    Sepsis ([address] A41.9    Migraine without status migrainosus, not intractable G43.909    Anxiety F41.9    Gastroparesis K31.84    Other hyperlipidemia E78.49    Controlled type 2 diabetes mellitus without complication, without long-term current use of insulin ([facility]) E11.9    Hearing loss H91.90     Patient Active Problem List    Diagnosis Date Noted    Hearing loss 07/10/2020    Migraine without status migrainosus, not intractable 05/20/2020    Anxiety 05/20/2020    Gastroparesis 05/20/2020    Other hyperlipidemia 05/20/2020    Controlled type 2 diabetes mellitus without complication, without long-term current use of insulin ([address] 05/20/2020    Cellulitis and abscess of neck 12/03/2016    Controlled type 2 diabetes mellitus without complication ([address] 77/88/2576    Sepsis ([address] 12/03/2016    Elevated pulse rate 08/31/2015 Chest pain with normal coronary angiography 07/28/2015    Morbid obesity with BMI of 45.0-49.9, adult ([facility] 07/28/2015    TMJ tenderness 06/01/2015    Sinusitis 06/01/2015    New daily persistent headache 06/01/2015    Foot pain, bilateral 04/08/2015    Unspecified hereditary and idiopathic peripheral neuropathy 04/08/2015    Chronic back pain 03/13/2014    Obesity 03/13/2014    Lyme disease 03/13/2014    Anxiety 03/13/2014    Diverticular disease 03/13/2014    GERD (gastroesophageal reflux disease) 03/13/2014    SOB (shortness of breath) 03/12/2014     Current Outpatient Medications   Medication Sig Dispense Refill    sertraline (ZOLOFT) 100 mg tablet Take 1 Tablet by mouth daily. 90 Tablet 0    diclofenac EC (VOLTAREN) 75 mg EC tablet Take 1 Tablet by mouth two (2) times daily as needed for Pain. 60 Tablet 0    busPIRone (BUSPAR) 10 mg tablet TAKE 1 TABLET BY MOUTH THREE TIMES A DAY 90 Tablet 0    cyclobenzaprine (FLEXERIL) 10 mg tablet TAKE 1 TABLET BY MOUTH 3 TIMES A DAY AS NEEDED FOR MUSCLE SPASMS. 90 Tablet 0    amLODIPine (NORVASC) 10 mg tablet TAKE 1 TABLET BY MOUTH DAILY 90 Tablet 4    topiramate (TOPAMAX) 50 mg tablet TAKE 1 TABLET BY MOUTH TWICE A DAY FOR MIGRAINE PREVENTION 60 Tablet 0    pregabalin (LYRICA) 50 mg capsule TAKE 1 CAPSULES BY MOUTH 3 TIMES A DAY 90 Capsule 0    traZODone (DESYREL) 100 mg tablet TAKE 1 TABLET BY MOUTH DAILY AS NEEDED FOR SLEEP. 60 Tablet 0    ondansetron (Zofran ODT) 4 mg disintegrating tablet Take 1 Tab by mouth every eight (8) hours as needed for Nausea, Vomiting or Nausea or Vomiting. 12 Tab 2    potassium chloride (K-DUR, KLOR-CON) 20 mEq tablet Take 1 Tab by mouth two (2) times a day. 30 Tab 0    pantoprazole (PROTONIX) 40 mg tablet Take 1 Tab by mouth daily. 90 Tab 1    famotidine (PEPCID) 40 mg tablet Take 1 Tab by mouth nightly.  90 Tab 1    atenoloL (TENORMIN) 50 mg tablet        Allergies   Allergen Reactions    Morphine Anaphylaxis     July 5, 2016 administered after surgery for pain. Bacitracin Other (comments)    Doxycycline Hives    Morphine Unknown (comments)    Other Medication Palpitations     Peripheral Block patient noted difficulty breathing and palpitations. Past Medical History:   Diagnosis Date    Arrhythmia     PAC    Arthritis     Cellulitis     cellulitis    Chest pain     Diabetes (HCC)     GERD (gastroesophageal reflux disease)     H/O seasonal allergies     Headache     Hearing loss 7/10/2020    Ill-defined condition     \"twisted\" kidney right    Lyme disease     Obesity     Plantar fasciitis     PUD (peptic ulcer disease)     Trigger finger, right index finger 2016    Vitamin D deficiency      Past Surgical History:   Procedure Laterality Date    HX GASTRIC BYPASS      HX GI      HX HEART CATHETERIZATION  7/28/15    HX ORTHOPAEDIC  7-30-15    Rt. rotator cuff repair     HX TONSILLECTOMY      MI COLSC FLX W/NDSC US XM RCTM ET AL LMTD&ADJ STRUX  2011     Family History   Problem Relation Age of Onset    Cancer Paternal Aunt         colon polyps    Cancer Paternal Uncle         colon cancer    Stroke Mother     Heart Disease Mother     Elevated Lipids Mother     Cancer Father         prostate    Heart Disease Father         death by MI    Coronary Art Dis Other         mother  64, father age 67 of MI    Other Brother         Passed while sleeping at 64, neck blockages and legs, hx of vascular surg    Hypertension Brother     Diabetes Brother     Anesth Problems Neg Hx      Social History     Tobacco Use    Smoking status: Some Days     Packs/day: 0.50     Types: Cigarettes    Smokeless tobacco: Never   Substance Use Topics    Alcohol use: Not Currently     Comment: rare       Review of Systems   Constitutional: Negative. HENT: Negative. Eyes: Negative. Respiratory: Negative. Cardiovascular: Negative. Gastrointestinal: Negative. Genitourinary: Negative. Musculoskeletal:  Positive for back pain and joint pain.    Skin: Negative. Neurological: Negative. Endo/Heme/Allergies: Negative. Psychiatric/Behavioral:  The patient is nervous/anxious. Objective:     Patient-Reported Vitals 9/19/2022   Patient-Reported Weight 160lbs   Patient-Reported Height -        [INSTRUCTIONS:  \"[x]\" Indicates a positive item  \"[]\" Indicates a negative item  -- DELETE ALL ITEMS NOT EXAMINED]    Constitutional: [x] Appears well-developed and well-nourished [x] No apparent distress      [] Abnormal -     Mental status: [x] Alert and awake  [x] Oriented to person/place/time [x] Able to follow commands    [] Abnormal -     Eyes:   EOM    [x]  Normal    [] Abnormal -   Sclera  [x]  Normal    [] Abnormal -          Discharge [x]  None visible   [] Abnormal -     HENT: [x] Normocephalic, atraumatic  [] Abnormal -   [x] Mouth/Throat: Mucous membranes are moist    External Ears [x] Normal  [] Abnormal -    Neck: [x] No visualized mass [] Abnormal -     Pulmonary/Chest: [x] Respiratory effort normal   [x] No visualized signs of difficulty breathing or respiratory distress        [] Abnormal -      Musculoskeletal:   [x] Normal gait with no signs of ataxia         [x] Normal range of motion of neck        [] Abnormal -     Neurological:        [x] No Facial Asymmetry (Cranial nerve 7 motor function) (limited exam due to video visit)          [x] No gaze palsy        [] Abnormal -          Skin:        [x] No significant exanthematous lesions or discoloration noted on facial skin         [] Abnormal -            Psychiatric:       [x] Normal Affect [] Abnormal -        [x] No Hallucinations    Other pertinent observable physical exam findings:-    We discussed the expected course, resolution and complications of the diagnosis(es) in detail. Medication risks, benefits, costs, interactions, and alternatives were discussed as indicated. I advised her to contact the office if her condition worsens, changes or fails to improve as anticipated.  She expressed understanding with the diagnosis(es) and plan. Jenny Maldonado is a 50 y.o. female  is being evaluated by a Virtual Visit (video visit) encounter to address concerns as mentioned above. A caregiver was present when appropriate. Due to this being a TeleHealth encounter (During JGHXJ-55 public health emergency), evaluation of the following organ systems was limited: Vitals/Constitutional/EENT/Resp/CV/GI//MS/Neuro/Skin/Heme-Lymph-Imm. Pursuant to the emergency declaration under the [address] [place] [address], [address] [place] [address] authority and the [employer] Act, this Virtual Visit was conducted with patient's (and/or legal guardian's) consent, to reduce the patient's risk of exposure to COVID-19 and provide necessary medical care. The patient (and/or legal guardian) has also been advised to contact this office for worsening conditions or problems, and seek emergency medical treatment and/or call 911 if deemed necessary. Patient identification was verified at the start of the visit: YES    Services were provided through a video synchronous discussion virtually to substitute for in-person clinic visit. Patient was located at their homes. Provider located in office    An electronic signature was used to authenticate this note.       Marce Cortes MD

## 2022-09-22 ENCOUNTER — PATIENT MESSAGE (OUTPATIENT)
Dept: FAMILY MEDICINE CLINIC | Age: 47
End: 2022-09-22

## 2022-09-23 DIAGNOSIS — G89.29 CHRONIC LEFT-SIDED LOW BACK PAIN WITH LEFT-SIDED SCIATICA: ICD-10-CM

## 2022-09-23 DIAGNOSIS — M54.42 CHRONIC LEFT-SIDED LOW BACK PAIN WITH LEFT-SIDED SCIATICA: ICD-10-CM

## 2022-09-26 RX ORDER — CYCLOBENZAPRINE HCL 10 MG
TABLET ORAL
Qty: 90 TABLET | Refills: 0 | Status: SHIPPED | OUTPATIENT
Start: 2022-09-26 | End: 2022-10-18 | Stop reason: SDUPTHER

## 2022-10-18 ENCOUNTER — TELEPHONE (OUTPATIENT)
Dept: FAMILY MEDICINE CLINIC | Age: 47
End: 2022-10-18

## 2022-10-18 DIAGNOSIS — G89.29 CHRONIC MIDLINE LOW BACK PAIN WITHOUT SCIATICA: ICD-10-CM

## 2022-10-18 DIAGNOSIS — M25.552 HIP PAIN, BILATERAL: ICD-10-CM

## 2022-10-18 DIAGNOSIS — M25.551 HIP PAIN, BILATERAL: ICD-10-CM

## 2022-10-18 DIAGNOSIS — M54.9 CHRONIC UPPER BACK PAIN: ICD-10-CM

## 2022-10-18 DIAGNOSIS — M54.50 CHRONIC MIDLINE LOW BACK PAIN WITHOUT SCIATICA: ICD-10-CM

## 2022-10-18 DIAGNOSIS — G89.29 CHRONIC UPPER BACK PAIN: ICD-10-CM

## 2022-10-18 NOTE — TELEPHONE ENCOUNTER
Maximino Earl,    Mrs. Kassandra Isiahnica would like her Flexeril & Zoloft to go to [facility] in [place] instead of [facility].   cassia

## 2022-10-19 RX ORDER — DICLOFENAC SODIUM 75 MG/1
75 TABLET, DELAYED RELEASE ORAL
Qty: 60 TABLET | Refills: 0 | Status: SHIPPED | OUTPATIENT
Start: 2022-10-19

## 2022-12-05 DIAGNOSIS — M54.9 CHRONIC UPPER BACK PAIN: ICD-10-CM

## 2022-12-05 DIAGNOSIS — G89.29 CHRONIC LEFT-SIDED LOW BACK PAIN WITH LEFT-SIDED SCIATICA: ICD-10-CM

## 2022-12-05 DIAGNOSIS — G89.29 CHRONIC MIDLINE LOW BACK PAIN WITHOUT SCIATICA: ICD-10-CM

## 2022-12-05 DIAGNOSIS — G89.29 CHRONIC UPPER BACK PAIN: ICD-10-CM

## 2022-12-05 DIAGNOSIS — M54.50 CHRONIC MIDLINE LOW BACK PAIN WITHOUT SCIATICA: ICD-10-CM

## 2022-12-05 DIAGNOSIS — M54.42 CHRONIC LEFT-SIDED LOW BACK PAIN WITH LEFT-SIDED SCIATICA: ICD-10-CM

## 2022-12-05 DIAGNOSIS — M25.551 HIP PAIN, BILATERAL: ICD-10-CM

## 2022-12-05 DIAGNOSIS — M25.552 HIP PAIN, BILATERAL: ICD-10-CM

## 2022-12-05 RX ORDER — DICLOFENAC SODIUM 75 MG/1
75 TABLET, DELAYED RELEASE ORAL
Qty: 60 TABLET | Refills: 0 | Status: SHIPPED | OUTPATIENT
Start: 2022-12-05

## 2022-12-05 RX ORDER — CYCLOBENZAPRINE HCL 10 MG
TABLET ORAL
Qty: 30 TABLET | Refills: 0 | Status: SHIPPED | OUTPATIENT
Start: 2022-12-05

## 2022-12-27 DIAGNOSIS — M25.552 HIP PAIN, BILATERAL: ICD-10-CM

## 2022-12-27 DIAGNOSIS — G89.29 CHRONIC MIDLINE LOW BACK PAIN WITHOUT SCIATICA: ICD-10-CM

## 2022-12-27 DIAGNOSIS — M54.50 CHRONIC MIDLINE LOW BACK PAIN WITHOUT SCIATICA: ICD-10-CM

## 2022-12-27 DIAGNOSIS — F41.9 ANXIETY: ICD-10-CM

## 2022-12-27 DIAGNOSIS — M54.9 CHRONIC UPPER BACK PAIN: ICD-10-CM

## 2022-12-27 DIAGNOSIS — M25.551 HIP PAIN, BILATERAL: ICD-10-CM

## 2022-12-27 DIAGNOSIS — G89.29 CHRONIC UPPER BACK PAIN: ICD-10-CM

## 2022-12-28 RX ORDER — SERTRALINE HYDROCHLORIDE 100 MG/1
TABLET, FILM COATED ORAL
Qty: 90 TABLET | Refills: 1 | Status: SHIPPED | OUTPATIENT
Start: 2022-12-28

## 2022-12-28 RX ORDER — DICLOFENAC SODIUM 75 MG/1
TABLET, DELAYED RELEASE ORAL
Qty: 60 TABLET | Refills: 2 | Status: SHIPPED | OUTPATIENT
Start: 2022-12-28

## 2023-01-17 DIAGNOSIS — G89.29 CHRONIC LEFT-SIDED LOW BACK PAIN WITH LEFT-SIDED SCIATICA: ICD-10-CM

## 2023-01-17 DIAGNOSIS — M54.42 CHRONIC LEFT-SIDED LOW BACK PAIN WITH LEFT-SIDED SCIATICA: ICD-10-CM

## 2023-01-19 RX ORDER — CYCLOBENZAPRINE HCL 10 MG
TABLET ORAL
Qty: 30 TABLET | Refills: 0 | Status: SHIPPED | OUTPATIENT
Start: 2023-01-19

## 2023-01-25 DIAGNOSIS — M54.42 CHRONIC LEFT-SIDED LOW BACK PAIN WITH LEFT-SIDED SCIATICA: ICD-10-CM

## 2023-01-25 DIAGNOSIS — G89.29 CHRONIC LEFT-SIDED LOW BACK PAIN WITH LEFT-SIDED SCIATICA: ICD-10-CM

## 2023-01-25 NOTE — TELEPHONE ENCOUNTER
PCP: Jourdan Foley MD    Last appt: 10/18/2022  No future appointments.     Requested Prescriptions     Pending Prescriptions Disp Refills    cyclobenzaprine (FLEXERIL) 10 mg tablet 30 Tablet 0       Prior labs and Blood pressures:  BP Readings from Last 3 Encounters:   06/24/22 130/89   01/06/21 107/67   12/03/20 99/69     Lab Results   Component Value Date/Time    Sodium 138 10/18/2022 08:37 AM    Potassium 4.2 10/18/2022 08:37 AM    Chloride 103 10/18/2022 08:37 AM    CO2 28 10/18/2022 08:37 AM    Anion gap 7 10/18/2022 08:37 AM    Glucose 89 10/18/2022 08:37 AM    BUN 6 10/18/2022 08:37 AM    Creatinine 0.82 10/18/2022 08:37 AM    BUN/Creatinine ratio 7 (L) 10/18/2022 08:37 AM    GFR est AA >60 04/02/2021 11:13 AM    GFR est non-AA >60 04/02/2021 11:13 AM    Calcium 9.4 10/18/2022 08:37 AM     Lab Results   Component Value Date/Time    Hemoglobin A1c 5.6 10/18/2022 08:37 AM     Lab Results   Component Value Date/Time    Cholesterol, total 196 10/18/2022 08:37 AM    HDL Cholesterol 79 10/18/2022 08:37 AM    LDL, calculated 95.8 10/18/2022 08:37 AM    VLDL, calculated 21.2 10/18/2022 08:37 AM    Triglyceride 106 10/18/2022 08:37 AM    CHOL/HDL Ratio 2.5 10/18/2022 08:37 AM     Lab Results   Component Value Date/Time    Vitamin D 25-Hydroxy 35.6 10/18/2022 08:37 AM       Lab Results   Component Value Date/Time    TSH 1.650 10/18/2022 08:37 AM    TSH 0.81 07/28/2020 10:35 AM

## 2023-01-26 RX ORDER — CYCLOBENZAPRINE HCL 10 MG
TABLET ORAL
Qty: 30 TABLET | Refills: 0 | Status: SHIPPED | OUTPATIENT
Start: 2023-01-26

## 2023-02-22 ENCOUNTER — TELEPHONE (OUTPATIENT)
Dept: FAMILY MEDICINE CLINIC | Age: 48
End: 2023-02-22

## 2023-02-22 ENCOUNTER — VIRTUAL VISIT (OUTPATIENT)
Dept: FAMILY MEDICINE CLINIC | Age: 48
End: 2023-02-22
Payer: COMMERCIAL

## 2023-02-22 DIAGNOSIS — H91.93 BILATERAL HEARING LOSS, UNSPECIFIED HEARING LOSS TYPE: ICD-10-CM

## 2023-02-22 DIAGNOSIS — J01.10 ACUTE NON-RECURRENT FRONTAL SINUSITIS: ICD-10-CM

## 2023-02-22 DIAGNOSIS — H66.91 RIGHT OTITIS MEDIA, UNSPECIFIED OTITIS MEDIA TYPE: Primary | ICD-10-CM

## 2023-02-22 DIAGNOSIS — Z12.11 SCREENING FOR MALIGNANT NEOPLASM OF COLON: ICD-10-CM

## 2023-02-22 DIAGNOSIS — Z12.31 SCREENING MAMMOGRAM, ENCOUNTER FOR: ICD-10-CM

## 2023-02-22 PROCEDURE — 99214 OFFICE O/P EST MOD 30 MIN: CPT | Performed by: FAMILY MEDICINE

## 2023-02-22 RX ORDER — AMOXICILLIN AND CLAVULANATE POTASSIUM 875; 125 MG/1; MG/1
1 TABLET, FILM COATED ORAL EVERY 12 HOURS
Qty: 20 TABLET | Refills: 0 | Status: SHIPPED | OUTPATIENT
Start: 2023-02-22 | End: 2023-03-04

## 2023-02-22 RX ORDER — CIPROFLOXACIN AND DEXAMETHASONE 3; 1 MG/ML; MG/ML
4 SUSPENSION/ DROPS AURICULAR (OTIC) 2 TIMES DAILY
Qty: 7.5 ML | Refills: 0 | Status: SHIPPED | OUTPATIENT
Start: 2023-02-22

## 2023-02-22 RX ORDER — BENZONATATE 100 MG/1
100 CAPSULE ORAL
Qty: 20 CAPSULE | Refills: 0 | Status: SHIPPED | OUTPATIENT
Start: 2023-02-22 | End: 2023-03-01

## 2023-02-22 NOTE — PROGRESS NOTES
Consent: Marci Lux, who was seen by synchronous (real-time) audio-video technology, and/or her healthcare decision maker, is aware that this patient-initiated, Telehealth encounter on 2/22/2023 is a billable service, with coverage as determined by her insurance carrier. She is aware that she may receive a bill and has provided verbal consent to proceed: Yes. Assessment & Plan:   Diagnoses and all orders for this visit:    1. Right otitis media, unspecified otitis media type  -     ciprofloxacin-dexamethasone (CIPRODEX) 0.3-0.1 % otic suspension; Administer 4 Drops into each ear two (2) times a day. 2. Acute non-recurrent frontal sinusitis  -     amoxicillin-clavulanate (AUGMENTIN) 875-125 mg per tablet; Take 1 Tablet by mouth every twelve (12) hours for 10 days. -     benzonatate (TESSALON) 100 mg capsule; Take 1 Capsule by mouth three (3) times daily as needed for Cough for up to 7 days. 3. Screening mammogram, encounter for  -     ANIKA MAMMO BI SCREENING INCL CAD; Future    4. Bilateral hearing loss, unspecified hearing loss type  -     REFERRAL TO ENT-OTOLARYNGOLOGY    5. Screening for malignant neoplasm of colon  -     REFERRAL TO GASTROENTEROLOGY          Follow-up and Dispositions    Return in about 2 weeks (around 3/8/2023) for follow up. I ADVISED PATIENT TO GO TO ER IF SYMPTOMS WORSEN , CHANGE OR FAILS TO IMPROVE. I spent at least 15 minutes with this established patient, and >50% of the time was spent counseling and/or coordinating care regarding SEE BELOW  [phone]  Subjective:   Maric Lux is a 50 y.o. 1975 female  established patient, who was seen for Ear Pain, Nasal Congestion (Blood comes out when blowing nose  Since yesterday ), URI, Medication Refill, and Other (Patient would like a doctors note for work )          1. Right otitis media, unspecified otitis media type  2.  Acute non-recurrent frontal sinusitis  Marci Lux is a 50 y.o. female who complains of recent onset of nasal congestion, sinus pressure, cough , sputum production, body aches, ear fullness. She denies shortness of breath, wheezing,  sore throat. Patient also noted that OTC remedies did not help. Reports fever     3. Screening mammogram, encounter for  Not up-to-date on mammogram.  Mammogram order placed in chart. Central scheduling number given to the patient. 4. Bilateral hearing loss, unspecified hearing loss type  Patient reports bilateral hearing loss. Reports tinnitus and ringing in her ear. Reports pain in the right ear. Reports discharge from the right ear. Patient reports she has been using a Q-tip inside her ear canal.  I discussed with her possibility of having ruptured eardrum. I have advised her to make appointment with ENT. 5. Screening for malignant neoplasm of colon  Not up-to-date on colonoscopy. GI referral information given to the patient. Prior to Admission medications    Medication Sig Start Date End Date Taking? Authorizing Provider   amoxicillin-clavulanate (AUGMENTIN) 875-125 mg per tablet Take 1 Tablet by mouth every twelve (12) hours for 10 days. 2/22/23 3/4/23 Yes Stephania Antoine MD   ciprofloxacin-dexamethasone (CIPRODEX) 0.3-0.1 % otic suspension Administer 4 Drops into each ear two (2) times a day. 2/22/23  Yes Stephania Antoine MD   benzonatate (TESSALON) 100 mg capsule Take 1 Capsule by mouth three (3) times daily as needed for Cough for up to 7 days.  2/22/23 3/1/23 Yes Stephania Antoine MD   cyclobenzaprine (FLEXERIL) 10 mg tablet TAKE ONE TABLET BY MOUTH THREE TIMES A DAY AS NEEDED FOR SPASM 1/26/23  Yes Stephania Antoine MD   sertraline (ZOLOFT) 100 mg tablet TAKE 1 TABLET BY MOUTH ONCE DAILY FOR DEPRESSION AND ANXIETY 12/28/22  Yes Stephania Antoine MD   diclofenac EC (VOLTAREN) 75 mg EC tablet TAKE 1 TABLET BY MOUTH TWO TIMES A DAY 12/28/22  Yes Stephania Antoine MD   busPIRone (BUSPAR) 10 mg tablet TAKE 1 TABLET BY MOUTH THREE TIMES A DAY 6/27/22  Yes Cristobal Tam MD S[place]eta   amLODIPine (NORVASC) 10 mg tablet TAKE 1 TABLET BY MOUTH DAILY 5/11/22  Yes Tam Vela MD   traZODone (DESYREL) 100 mg tablet TAKE 1 TABLET BY MOUTH DAILY AS NEEDED FOR SLEEP. 11/10/21  Yes Tam Vela MD   ondansetron (Zofran ODT) 4 mg disintegrating tablet Take 1 Tab by mouth every eight (8) hours as needed for Nausea, Vomiting or Nausea or Vomiting. 1/20/21  Yes Tam Vela MD   pantoprazole (PROTONIX) 40 mg tablet Take 1 Tab by mouth daily. 1/20/21  Yes Tam Vela MD   famotidine (PEPCID) 40 mg tablet Take 1 Tab by mouth nightly. 1/20/21  Yes Tam Vela MD   atenoloL (TENORMIN) 50 mg tablet  5/21/20  Yes Maldonado Henderson   topiramate (TOPAMAX) 50 mg tablet TAKE 1 TABLET BY MOUTH TWICE A DAY FOR MIGRAINE PREVENTION  Patient not taking: Reported on 2/22/2023 5/10/22   Tam Vela MD   pregabalin (LYRICA) 50 mg capsule TAKE 1 CAPSULES BY MOUTH 3 TIMES A DAY  Patient not taking: Reported on 2/22/2023 12/5/21   Velma PENA NP   potassium chloride (K-DUR, KLOR-CON) 20 mEq tablet Take 1 Tab by mouth two (2) times a day. Patient not taking: Reported on 2/22/2023 1/20/21   Tam Vela MD     Allergies   Allergen Reactions    Morphine Anaphylaxis     July 5, 2016 administered after surgery for pain. Bacitracin Other (comments)    Doxycycline Hives    Morphine Unknown (comments)    Other Medication Palpitations     Peripheral Block patient noted difficulty breathing and palpitations.        Patient Active Problem List   Diagnosis Code    SOB (shortness of breath) R06.02    Chronic back pain M54.9, G89.29    Obesity E66.9    Lyme disease A69.20    Anxiety F41.9    Diverticular disease K57.90    GERD (gastroesophageal reflux disease) K21.9    Foot pain, bilateral M79.671, M79.672    Unspecified hereditary and idiopathic peripheral neuropathy G60.9    TMJ tenderness M26.629    Sinusitis J32.9    New daily persistent headache G44.52    Chest pain with normal coronary angiography R07.9    Morbid obesity with BMI of 45.0-49.9, adult ([facility]) E66.01, Z68.42    Elevated pulse rate R00.0    Cellulitis and abscess of neck L03.221, L02.11    Controlled type 2 diabetes mellitus without complication ([facility]) S08.9    Sepsis ([facility]) A41.9    Migraine without status migrainosus, not intractable G43.909    Anxiety F41.9    Gastroparesis K31.84    Other hyperlipidemia E78.49    Controlled type 2 diabetes mellitus without complication, without long-term current use of insulin ([facility]) E11.9    Hearing loss H91.90     Patient Active Problem List    Diagnosis Date Noted    Hearing loss 07/10/2020    Migraine without status migrainosus, not intractable 05/20/2020    Anxiety 05/20/2020    Gastroparesis 05/20/2020    Other hyperlipidemia 05/20/2020    Controlled type 2 diabetes mellitus without complication, without long-term current use of insulin ([facility] 05/20/2020    Cellulitis and abscess of neck 12/03/2016    Controlled type 2 diabetes mellitus without complication ([facility] 37/21/9188    Sepsis ([facility] 12/03/2016    Elevated pulse rate 08/31/2015    Chest pain with normal coronary angiography 07/28/2015    Morbid obesity with BMI of 45.0-49.9, adult ([facility] 07/28/2015    TMJ tenderness 06/01/2015    Sinusitis 06/01/2015    New daily persistent headache 06/01/2015    Foot pain, bilateral 04/08/2015    Unspecified hereditary and idiopathic peripheral neuropathy 04/08/2015    Chronic back pain 03/13/2014    Obesity 03/13/2014    Lyme disease 03/13/2014    Anxiety 03/13/2014    Diverticular disease 03/13/2014    GERD (gastroesophageal reflux disease) 03/13/2014    SOB (shortness of breath) 03/12/2014     Current Outpatient Medications   Medication Sig Dispense Refill    amoxicillin-clavulanate (AUGMENTIN) 875-125 mg per tablet Take 1 Tablet by mouth every twelve (12) hours for 10 days.  20 Tablet 0    ciprofloxacin-dexamethasone (CIPRODEX) 0.3-0.1 % otic suspension Administer 4 Drops into each ear two (2) times a day. 7.5 mL 0    benzonatate (TESSALON) 100 mg capsule Take 1 Capsule by mouth three (3) times daily as needed for Cough for up to 7 days. 20 Capsule 0    cyclobenzaprine (FLEXERIL) 10 mg tablet TAKE ONE TABLET BY MOUTH THREE TIMES A DAY AS NEEDED FOR SPASM 30 Tablet 0    sertraline (ZOLOFT) 100 mg tablet TAKE 1 TABLET BY MOUTH ONCE DAILY FOR DEPRESSION AND ANXIETY 90 Tablet 1    diclofenac EC (VOLTAREN) 75 mg EC tablet TAKE 1 TABLET BY MOUTH TWO TIMES A DAY 60 Tablet 2    busPIRone (BUSPAR) 10 mg tablet TAKE 1 TABLET BY MOUTH THREE TIMES A DAY 90 Tablet 0    amLODIPine (NORVASC) 10 mg tablet TAKE 1 TABLET BY MOUTH DAILY 90 Tablet 4    traZODone (DESYREL) 100 mg tablet TAKE 1 TABLET BY MOUTH DAILY AS NEEDED FOR SLEEP. 60 Tablet 0    ondansetron (Zofran ODT) 4 mg disintegrating tablet Take 1 Tab by mouth every eight (8) hours as needed for Nausea, Vomiting or Nausea or Vomiting. 12 Tab 2    pantoprazole (PROTONIX) 40 mg tablet Take 1 Tab by mouth daily. 90 Tab 1    famotidine (PEPCID) 40 mg tablet Take 1 Tab by mouth nightly. 90 Tab 1    atenoloL (TENORMIN) 50 mg tablet       topiramate (TOPAMAX) 50 mg tablet TAKE 1 TABLET BY MOUTH TWICE A DAY FOR MIGRAINE PREVENTION (Patient not taking: Reported on 2/22/2023) 60 Tablet 0    pregabalin (LYRICA) 50 mg capsule TAKE 1 CAPSULES BY MOUTH 3 TIMES A DAY (Patient not taking: Reported on 2/22/2023) 90 Capsule 0    potassium chloride (K-DUR, KLOR-CON) 20 mEq tablet Take 1 Tab by mouth two (2) times a day. (Patient not taking: Reported on 2/22/2023) 30 Tab 0     Allergies   Allergen Reactions    Morphine Anaphylaxis     July 5, 2016 administered after surgery for pain. Bacitracin Other (comments)    Doxycycline Hives    Morphine Unknown (comments)    Other Medication Palpitations     Peripheral Block patient noted difficulty breathing and palpitations.      Past Medical History:   Diagnosis Date    Arrhythmia     PAC    Arthritis     Cellulitis     cellulitis    Chest pain Diabetes ([facility]     GERD (gastroesophageal reflux disease)     H/O seasonal allergies     Headache     Hearing loss 7/10/2020    Ill-defined condition     \"twisted\" kidney right    Lyme disease     Obesity     Plantar fasciitis     PUD (peptic ulcer disease)     Trigger finger, right index finger 2016    Vitamin D deficiency      Past Surgical History:   Procedure Laterality Date    HX GASTRIC BYPASS      HX GI      HX HEART CATHETERIZATION  7/28/15    HX ORTHOPAEDIC  7-30-15    Rt. rotator cuff repair     HX TONSILLECTOMY      SC COLSC FLX W/NDSC US XM RCTM ET AL LMTD&ADJ STRUX       Family History   Problem Relation Age of Onset    Cancer Paternal Aunt         colon polyps    Cancer Paternal Uncle         colon cancer    Stroke Mother     Heart Disease Mother     Elevated Lipids Mother     Cancer Father         prostate    Heart Disease Father         death by MI    Coronary Art Dis Other         mother  64, father age 67 of MI    Other Brother         Passed while sleeping at 64, neck blockages and legs, hx of vascular surg    Hypertension Brother     Diabetes Brother     Anesth Problems Neg Hx      Social History     Tobacco Use    Smoking status: Some Days     Packs/day: 0.50     Types: Cigarettes    Smokeless tobacco: Never   Substance Use Topics    Alcohol use: Not Currently     Comment: rare       Review of Systems   Constitutional:  Positive for chills and fever. HENT:  Positive for congestion, ear discharge, ear pain, hearing loss, sinus pain and tinnitus. Eyes: Negative. Respiratory:  Positive for cough and sputum production. Cardiovascular: Negative. Gastrointestinal: Negative. Genitourinary: Negative. Musculoskeletal: Negative. Skin: Negative. Neurological: Negative. Endo/Heme/Allergies: Negative. Psychiatric/Behavioral: Negative.            Objective:     Patient-Reported Vitals 2023   Patient-Reported Weight 147lb   Patient-Reported Height - Patient-Reported Systolic  637   Patient-Reported Diastolic 84        [INSTRUCTIONS:  \"[x]\" Indicates a positive item  \"[]\" Indicates a negative item  -- DELETE ALL ITEMS NOT EXAMINED]    Constitutional: [x] Appears well-developed and well-nourished [x] No apparent distress      [] Abnormal -     Mental status: [x] Alert and awake  [x] Oriented to person/place/time [x] Able to follow commands    [] Abnormal -     Eyes:   EOM    [x]  Normal    [] Abnormal -   Sclera  [x]  Normal    [] Abnormal -          Discharge [x]  None visible   [] Abnormal -     HENT: [x] Normocephalic, atraumatic  [] Abnormal -   [x] Mouth/Throat: Mucous membranes are moist    External Ears [x] Normal  [] Abnormal -    Neck: [x] No visualized mass [] Abnormal -     Pulmonary/Chest: [x] Respiratory effort normal   [x] No visualized signs of difficulty breathing or respiratory distress        [] Abnormal -      Musculoskeletal:   [x] Normal gait with no signs of ataxia         [x] Normal range of motion of neck        [] Abnormal -     Neurological:        [x] No Facial Asymmetry (Cranial nerve 7 motor function) (limited exam due to video visit)          [x] No gaze palsy        [] Abnormal -          Skin:        [x] No significant exanthematous lesions or discoloration noted on facial skin         [] Abnormal -            Psychiatric:       [x] Normal Affect [] Abnormal -        [x] No Hallucinations    Other pertinent observable physical exam findings:-    We discussed the expected course, resolution and complications of the diagnosis(es) in detail. Medication risks, benefits, costs, interactions, and alternatives were discussed as indicated. I advised her to contact the office if her condition worsens, changes or fails to improve as anticipated. She expressed understanding with the diagnosis(es) and plan.            Ru Azar is a 50 y.o. female  is being evaluated by a Virtual Visit (video visit) encounter to address concerns as mentioned above. A caregiver was present when appropriate. Due to this being a TeleHealth encounter (During NMRIN-70 public health emergency), evaluation of the following organ systems was limited: Vitals/Constitutional/EENT/Resp/CV/GI//MS/Neuro/Skin/Heme-Lymph-Imm. Pursuant to the emergency declaration under the [address] [place] [address], [address] [place] [address] authority and the [employer] Act, this Virtual Visit was conducted with patient's (and/or legal guardian's) consent, to reduce the patient's risk of exposure to COVID-19 and provide necessary medical care. The patient (and/or legal guardian) has also been advised to contact this office for worsening conditions or problems, and seek emergency medical treatment and/or call 911 if deemed necessary. Patient identification was verified at the start of the visit: YES    Services were provided through a video synchronous discussion virtually to substitute for in-person clinic visit. Patient was located at their homes. Provider located in office    An electronic signature was used to authenticate this note.       Myrna Hwang MD

## 2023-02-22 NOTE — PROGRESS NOTES
1. Have you been to the ER, urgent care clinic since your last visit? Hospitalized since your last visit? No    2. Have you seen or consulted any other health care providers outside of the [address] [place] [address] since your last visit? Include any pap smears or colon screening.  No        Chief Complaint   Patient presents with    Ear Pain    Nasal Congestion     Blood comes out when blowing nose  Since yesterday     URI    Medication Refill    Other     Patient would like a doctors note for work

## 2023-02-22 NOTE — LETTER
NOTIFICATION RETURN TO WORK / SCHOOL    2/22/2023 12:28 PM    Ms. Davie Hurst [id number]      To Whom It May Concern:    Yaya Estrada is currently under the care of [address] [facility]. PLEASE EXCUSE FROM WORK STARTING 2/18/2023    She will return to work/school on: 02/27/2023    If there are questions or concerns please have the patient contact our office.         Sincerely,      Lewis Streeter MD

## 2023-04-29 NOTE — PATIENT INSTRUCTIONS
Pt seen today for hematoma tht occurred 2 weeks ago after a fall. Pt reports the area is draining. Drainage noted on guaze wrap, area was red and slightly firm. Tenderness noted. X ray has been done by ER and Negative for Fx. Good movement to extremity. Antibiotic given today as precaution, use ice and elevation to help with swelling. Go to Er for any red streaks, warmth, odor or fever. Hematoma: Care Instructions  Your Care Instructions     A hematoma is a bad bruise. It happens when an injury causes blood to collect and pool under the skin. The pooling blood gives the skin a spongy, rubbery, lumpy feel. A hematoma usually is not a cause for concern. It is not the same thing as a blood clot in a vein, and it does not cause blood clots. Follow-up care is a key part of your treatment and safety. Be sure to make and go to all appointments, and call your doctor if you are having problems. It's also a good idea to know your test results and keep a list of the medicines you take. How can you care for yourself at home? · Rest and protect the bruised area. · Put ice or a cold pack on the area for 10 to 20 minutes at a time. · Prop up the bruised area on a pillow when you ice it or anytime you sit or lie down during the next 3 days. Try to keep it above the level of your heart. This will help reduce swelling. · Wrapping the bruised area with an elastic bandage such as an Ace wrap will help decrease swelling. Don't wrap it too tightly, as this can cause more swelling below the affected area. · Be safe with medicines. Read and follow all instructions on the label. ? If the doctor gave you a prescription medicine for pain, take it as prescribed. ? If you are not taking a prescription pain medicine, ask your doctor if you can take an over-the-counter medicine. · Do not take two or more pain medicines at the same time unless the doctor told you to. Many pain medicines have acetaminophen, which is Tylenol. Too much acetaminophen (Tylenol) can be harmful. When should you call for help? Call your doctor now or seek immediate medical care if:  · You have signs of skin infection, such as:  ? Increased pain, swelling, warmth, or redness. ? Red streaks leading from the area. ? Pus draining from the area. ? A fever. Watch closely for changes in your health, and be sure to contact your doctor if:  · The bruise lasts longer than 4 weeks. · The bruise gets bigger or becomes more painful. · You do not get better as expected. Where can you learn more? Go to http://katerina-bryson.info/  Enter P911 in the search box to learn more about \"Hematoma: Care Instructions. \"  Current as of: June 26, 2019               Content Version: 12.5  © 2775-6443 [employer]. Care instructions adapted under license by [employer] (which disclaims liability or warranty for this information). If you have questions about a medical condition or this instruction, always ask your healthcare professional. [place] [address] any warranty or liability for your use of this information. Cellulitis: Care Instructions  Your Care Instructions     Cellulitis is a skin infection caused by bacteria, most often strep or staph. It often occurs after a break in the skin from a scrape, cut, bite, or puncture, or after a rash. Cellulitis may be treated without doing tests to find out what caused it. But your doctor may do tests, if needed, to look for a specific bacteria, like methicillin-resistant Staphylococcus aureus (MRSA). The doctor has checked you carefully, but problems can develop later. If you notice any problems or new symptoms, get medical treatment right away. Follow-up care is a key part of your treatment and safety. Be sure to make and go to all appointments, and call your doctor if you are having problems.  It's also a good idea to know your test results and keep a list of the medicines you take. How can you care for yourself at home? · Take your antibiotics as directed. Do not stop taking them just because you feel better. You need to take the full course of antibiotics. · Prop up the infected area on pillows to reduce pain and swelling. Try to keep the area above the level of your heart as often as you can. · If your doctor told you how to care for your wound, follow your doctor's instructions. If you did not get instructions, follow this general advice:  ? Wash the wound with clean water 2 times a day. Don't use hydrogen peroxide or alcohol, which can slow healing. ? You may cover the wound with a thin layer of petroleum jelly, such as Vaseline, and a nonstick bandage. ? Apply more petroleum jelly and replace the bandage as needed. · Be safe with medicines. Take pain medicines exactly as directed. ? If the doctor gave you a prescription medicine for pain, take it as prescribed. ? If you are not taking a prescription pain medicine, ask your doctor if you can take an over-the-counter medicine. To prevent cellulitis in the future  · Try to prevent cuts, scrapes, or other injuries to your skin. Cellulitis most often occurs where there is a break in the skin. · If you get a scrape, cut, mild burn, or bite, wash the wound with clean water as soon as you can to help avoid infection. Don't use hydrogen peroxide or alcohol, which can slow healing. · If you have swelling in your legs (edema), support stockings and good skin care may help prevent leg sores and cellulitis. · Take care of your feet, especially if you have diabetes or other conditions that increase the risk of infection. Wear shoes and socks. Do not go barefoot. If you have athlete's foot or other skin problems on your feet, talk to your doctor about how to treat them. When should you call for help?    Call your doctor now or seek immediate medical care if:  · You have signs that your infection is getting worse, such as:  ? Increased pain, swelling, warmth, or redness. ? Red streaks leading from the area. ? Pus draining from the area. ? A fever. · You get a rash. Watch closely for changes in your health, and be sure to contact your doctor if:  · You do not get better as expected. Where can you learn more? Go to http://www.gray.com/  Enter X309 in the search box to learn more about \"Cellulitis: Care Instructions. \"  Current as of: October 31, 2019               Content Version: 12.5  © 2256-1835 [employer]. Care instructions adapted under license by [employer] (which disclaims liability or warranty for this information). If you have questions about a medical condition or this instruction, always ask your healthcare professional. [address] any warranty or liability for your use of this information. Abnormal Lactate

## 2023-07-11 RX ORDER — DICLOFENAC SODIUM 75 MG/1
TABLET, DELAYED RELEASE ORAL
Qty: 60 TABLET | Refills: 1 | OUTPATIENT
Start: 2023-07-11

## 2023-07-11 RX ORDER — AMLODIPINE BESYLATE 10 MG/1
TABLET ORAL
Qty: 30 TABLET | Refills: 0 | Status: SHIPPED | OUTPATIENT
Start: 2023-07-11 | End: 2023-09-05

## 2023-07-11 RX ORDER — SERTRALINE HYDROCHLORIDE 100 MG/1
TABLET, FILM COATED ORAL
Qty: 30 TABLET | Refills: 0 | Status: SHIPPED | OUTPATIENT
Start: 2023-07-11

## 2023-07-11 NOTE — TELEPHONE ENCOUNTER
PCP: James Loja MD    Last appt: 02/22/23  Last visit for anxiety sertraline)   &  back pain (diclofenac): 09/19/22      No future appointments.     Requested Prescriptions     Pending Prescriptions Disp Refills    sertraline (ZOLOFT) 100 MG tablet [Pharmacy Med Name: SERTRALINE HCL 100MG TAB] 90 tablet 0     Sig: TAKE 1 TABLET BY MOUTH ONCE DAILY FOR DEPRESSION AND ANXIETY    amLODIPine (NORVASC) 10 MG tablet [Pharmacy Med Name: AMLODIPINE BESYLATE 10MG TAB] 90 tablet 4     Sig: TAKE 1 TABLET BY MOUTH DAILY FOR HIGH BLOOD PRESSURE    diclofenac (VOLTAREN) 75 MG EC tablet [Pharmacy Med Name: DICLOFENAC SODIUM 75MG TAB] 60 tablet 1     Sig: TAKE 1 TABLET BY MOUTH TWO TIMES A DAY       Prior labs and Blood pressures:  BP Readings from Last 3 Encounters:   No data found for BP     Lab Results   Component Value Date/Time     10/18/2022 08:37 AM    K 4.2 10/18/2022 08:37 AM     10/18/2022 08:37 AM    CO2 28 10/18/2022 08:37 AM    BUN 6 10/18/2022 08:37 AM    GFRAA >60 04/02/2021 11:13 AM     No results found for: HBA1C, JTZ0MXVO  Lab Results   Component Value Date/Time    CHOL 196 10/18/2022 08:37 AM    HDL 79 10/18/2022 08:37 AM     No results found for: VITD3, VD3RIA    Lab Results   Component Value Date/Time    TSH 1.650 10/18/2022 08:37 AM

## 2023-08-29 NOTE — TELEPHONE ENCOUNTER
PCP: Prabhjot White MD    Last appt: [unfilled]  Future Appointments   Date Time Provider [address]   9/19/2023  1:40 PM Duane Rolls, APRN - NP [facility]     Medication filled by Jerod Austin MD on 7/11/2023    Requested Prescriptions     Pending Prescriptions Disp Refills    amLODIPine (NORVASC) 10 MG tablet [Pharmacy Med Name: AMLODIPINE BESYLATE 10MG TAB] 30 tablet 0     Sig: TAKE 1 TABLET BY MOUTH DAILY FOR HIGH BLOOD PRESSURE    diclofenac (VOLTAREN) 75 MG EC tablet 60 tablet      Sig: Take 1 tablet by mouth 2 times daily       Prior labs and Blood pressures:  BP Readings from Last 3 Encounters:   No data found for BP     Lab Results   Component Value Date/Time     10/18/2022 08:37 AM    K 4.2 10/18/2022 08:37 AM     10/18/2022 08:37 AM    CO2 28 10/18/2022 08:37 AM    BUN 6 10/18/2022 08:37 AM    GFRAA >60 04/02/2021 11:13 AM     No results found for: HBA1C, FGL0DDYH  Lab Results   Component Value Date/Time    CHOL 196 10/18/2022 08:37 AM    HDL 79 10/18/2022 08:37 AM     No results found for: VITD3, VD3RIA    Lab Results   Component Value Date/Time    TSH 1.650 10/18/2022 08:37 AM

## 2023-08-30 RX ORDER — DICLOFENAC SODIUM 75 MG/1
TABLET, DELAYED RELEASE ORAL
Qty: 60 TABLET | OUTPATIENT
Start: 2023-08-30

## 2023-09-05 RX ORDER — AMLODIPINE BESYLATE 10 MG/1
TABLET ORAL
Qty: 90 TABLET | Refills: 1 | Status: SHIPPED | OUTPATIENT
Start: 2023-09-05 | End: 2023-10-24

## 2023-09-05 RX ORDER — DICLOFENAC SODIUM 75 MG/1
75 TABLET, DELAYED RELEASE ORAL 2 TIMES DAILY
Qty: 60 TABLET | OUTPATIENT
Start: 2023-09-05

## 2023-09-19 ENCOUNTER — OFFICE VISIT (OUTPATIENT)
Facility: CLINIC | Age: 48
End: 2023-09-19

## 2023-09-19 VITALS
HEART RATE: 114 BPM | BODY MASS INDEX: 22.53 KG/M2 | DIASTOLIC BLOOD PRESSURE: 86 MMHG | WEIGHT: 132 LBS | HEIGHT: 64 IN | RESPIRATION RATE: 16 BRPM | SYSTOLIC BLOOD PRESSURE: 135 MMHG | TEMPERATURE: 97.2 F | OXYGEN SATURATION: 98 %

## 2023-09-19 DIAGNOSIS — R63.4 WEIGHT LOSS, ABNORMAL: Primary | ICD-10-CM

## 2023-09-19 DIAGNOSIS — M25.511 ACUTE PAIN OF RIGHT SHOULDER: ICD-10-CM

## 2023-09-19 RX ORDER — OMEPRAZOLE 20 MG/1
20 CAPSULE, DELAYED RELEASE ORAL 2 TIMES DAILY
COMMUNITY
Start: 2023-08-26

## 2023-09-19 RX ORDER — CYCLOBENZAPRINE HCL 10 MG
10 TABLET ORAL 2 TIMES DAILY PRN
Qty: 20 TABLET | Refills: 0 | Status: SHIPPED | OUTPATIENT
Start: 2023-09-19

## 2023-09-19 RX ORDER — DICLOFENAC SODIUM 75 MG/1
75 TABLET, DELAYED RELEASE ORAL 2 TIMES DAILY
Qty: 60 TABLET | Refills: 0 | Status: SHIPPED | OUTPATIENT
Start: 2023-09-19

## 2023-09-19 SDOH — ECONOMIC STABILITY: FOOD INSECURITY: WITHIN THE PAST 12 MONTHS, THE FOOD YOU BOUGHT JUST DIDN'T LAST AND YOU DIDN'T HAVE MONEY TO GET MORE.: NEVER TRUE

## 2023-09-19 SDOH — ECONOMIC STABILITY: INCOME INSECURITY: HOW HARD IS IT FOR YOU TO PAY FOR THE VERY BASICS LIKE FOOD, HOUSING, MEDICAL CARE, AND HEATING?: NOT HARD AT ALL

## 2023-09-19 SDOH — ECONOMIC STABILITY: HOUSING INSECURITY
IN THE LAST 12 MONTHS, WAS THERE A TIME WHEN YOU DID NOT HAVE A STEADY PLACE TO SLEEP OR SLEPT IN A SHELTER (INCLUDING NOW)?: NO

## 2023-09-19 SDOH — ECONOMIC STABILITY: FOOD INSECURITY: WITHIN THE PAST 12 MONTHS, YOU WORRIED THAT YOUR FOOD WOULD RUN OUT BEFORE YOU GOT MONEY TO BUY MORE.: NEVER TRUE

## 2023-09-19 ASSESSMENT — ENCOUNTER SYMPTOMS
ALLERGIC/IMMUNOLOGIC NEGATIVE: 1
GASTROINTESTINAL NEGATIVE: 1
RESPIRATORY NEGATIVE: 1
EYES NEGATIVE: 1

## 2023-09-19 ASSESSMENT — PATIENT HEALTH QUESTIONNAIRE - PHQ9
SUM OF ALL RESPONSES TO PHQ9 QUESTIONS 1 & 2: 0
SUM OF ALL RESPONSES TO PHQ QUESTIONS 1-9: 0
2. FEELING DOWN, DEPRESSED OR HOPELESS: 0
SUM OF ALL RESPONSES TO PHQ QUESTIONS 1-9: 0
SUM OF ALL RESPONSES TO PHQ QUESTIONS 1-9: 0
1. LITTLE INTEREST OR PLEASURE IN DOING THINGS: 0
SUM OF ALL RESPONSES TO PHQ QUESTIONS 1-9: 0

## 2023-09-19 NOTE — PATIENT INSTRUCTIONS
We will follow up with labs when they return  Need to schedule with Ortho for shoulder pain  Need to be sure to keep and see GI as scheduled. We will try to get you in sooner  Scheduling will call to set up CT scan and Mammogram   If you do not hear from them in 2 days, call [phone] to schedule. You can go by any imaging center for [facility] to have X ray done.

## 2023-09-20 LAB
25(OH)D3 SERPL-MCNC: 26.6 NG/ML (ref 30–100)
ALBUMIN SERPL-MCNC: 4.1 G/DL (ref 3.5–5)
ALBUMIN/GLOB SERPL: 1.1 (ref 1.1–2.2)
ALP SERPL-CCNC: 75 U/L (ref 45–117)
ALT SERPL-CCNC: 52 U/L (ref 12–78)
ANION GAP SERPL CALC-SCNC: 10 MMOL/L (ref 5–15)
AST SERPL-CCNC: 76 U/L (ref 15–37)
BASOPHILS # BLD: 0.1 K/UL (ref 0–0.1)
BASOPHILS NFR BLD: 1 % (ref 0–1)
BILIRUB SERPL-MCNC: 0.7 MG/DL (ref 0.2–1)
BUN SERPL-MCNC: 7 MG/DL (ref 6–20)
BUN/CREAT SERPL: 9 (ref 12–20)
CALCIUM SERPL-MCNC: 10.2 MG/DL (ref 8.5–10.1)
CHLORIDE SERPL-SCNC: 105 MMOL/L (ref 97–108)
CHOLEST SERPL-MCNC: 228 MG/DL
CO2 SERPL-SCNC: 25 MMOL/L (ref 21–32)
COMMENT:: NORMAL
CREAT SERPL-MCNC: 0.81 MG/DL (ref 0.55–1.02)
DIFFERENTIAL METHOD BLD: ABNORMAL
EOSINOPHIL # BLD: 0 K/UL (ref 0–0.4)
EOSINOPHIL NFR BLD: 0 % (ref 0–7)
ERYTHROCYTE [DISTWIDTH] IN BLOOD BY AUTOMATED COUNT: 12.9 % (ref 11.5–14.5)
EST. AVERAGE GLUCOSE BLD GHB EST-MCNC: 103 MG/DL
FERRITIN SERPL-MCNC: 135 NG/ML (ref 26–388)
FOLATE SERPL-MCNC: 5.1 NG/ML (ref 5–21)
GLOBULIN SER CALC-MCNC: 3.6 G/DL (ref 2–4)
GLUCOSE SERPL-MCNC: 100 MG/DL (ref 65–100)
HBA1C MFR BLD: 5.2 % (ref 4–5.6)
HCT VFR BLD AUTO: 49.9 % (ref 35–47)
HCV AB SER IA-ACNC: 0.1 INDEX
HCV AB SERPL QL IA: NONREACTIVE
HDLC SERPL-MCNC: 150 MG/DL
HDLC SERPL: 1.5 (ref 0–5)
HGB BLD-MCNC: 17.2 G/DL (ref 11.5–16)
HIV 1+2 AB+HIV1 P24 AG SERPL QL IA: NONREACTIVE
HIV 1/2 RESULT COMMENT: NORMAL
IMM GRANULOCYTES # BLD AUTO: 0 K/UL (ref 0–0.04)
IMM GRANULOCYTES NFR BLD AUTO: 0 % (ref 0–0.5)
IRON SATN MFR SERPL: 47 % (ref 20–50)
IRON SERPL-MCNC: 141 UG/DL (ref 35–150)
LDLC SERPL CALC-MCNC: 62 MG/DL (ref 0–100)
LYMPHOCYTES # BLD: 1.2 K/UL (ref 0.8–3.5)
LYMPHOCYTES NFR BLD: 20 % (ref 12–49)
MCH RBC QN AUTO: 34.5 PG (ref 26–34)
MCHC RBC AUTO-ENTMCNC: 34.5 G/DL (ref 30–36.5)
MCV RBC AUTO: 100.2 FL (ref 80–99)
MONOCYTES # BLD: 0.4 K/UL (ref 0–1)
MONOCYTES NFR BLD: 8 % (ref 5–13)
NEUTS SEG # BLD: 4.1 K/UL (ref 1.8–8)
NEUTS SEG NFR BLD: 71 % (ref 32–75)
NRBC # BLD: 0 K/UL (ref 0–0.01)
NRBC BLD-RTO: 0 PER 100 WBC
PLATELET # BLD AUTO: 230 K/UL (ref 150–400)
PMV BLD AUTO: 11.2 FL (ref 8.9–12.9)
POTASSIUM SERPL-SCNC: 4.4 MMOL/L (ref 3.5–5.1)
PROT SERPL-MCNC: 7.7 G/DL (ref 6.4–8.2)
RBC # BLD AUTO: 4.98 M/UL (ref 3.8–5.2)
SODIUM SERPL-SCNC: 140 MMOL/L (ref 136–145)
SPECIMEN HOLD: NORMAL
TIBC SERPL-MCNC: 301 UG/DL (ref 250–450)
TRIGL SERPL-MCNC: 80 MG/DL
TSH SERPL DL<=0.05 MIU/L-ACNC: 0.89 UIU/ML (ref 0.36–3.74)
VIT B12 SERPL-MCNC: 278 PG/ML (ref 193–986)
VLDLC SERPL CALC-MCNC: 16 MG/DL
WBC # BLD AUTO: 5.8 K/UL (ref 3.6–11)

## 2023-09-21 ENCOUNTER — TELEPHONE (OUTPATIENT)
Facility: CLINIC | Age: 48
End: 2023-09-21

## 2023-09-21 NOTE — TELEPHONE ENCOUNTER
Pt was seen by VICKY Zhao and was sent to radiologr for CT scan. She had questions about scheduling and has more questions to ask nurse.  (Did not want to talk about her issue) please call [phone]

## 2023-09-22 ENCOUNTER — TRANSCRIBE ORDERS (OUTPATIENT)
Facility: HOSPITAL | Age: 48
End: 2023-09-22

## 2023-09-22 ENCOUNTER — HOSPITAL ENCOUNTER (OUTPATIENT)
Facility: HOSPITAL | Age: 48
End: 2023-09-22
Payer: COMMERCIAL

## 2023-09-22 DIAGNOSIS — M25.511 ACUTE PAIN OF RIGHT SHOULDER: ICD-10-CM

## 2023-09-22 DIAGNOSIS — Z12.31 BREAST CANCER SCREENING BY MAMMOGRAM: Primary | ICD-10-CM

## 2023-09-22 PROCEDURE — 73030 X-RAY EXAM OF SHOULDER: CPT

## 2023-09-22 NOTE — TELEPHONE ENCOUNTER
Two patient Identification confirmed. Patient states seen GI specialist today. Colonoscopy and Endoscopy on 11/9/2023. Patient notified about lab results. Patient verbalized understanding. Patient states was unable to get CT due to [facility] location only 3D imaging and insurance may not pay for imaging. Patient instructed will have to get alternative locations from Scheduling for imaging to assist with CT scan.

## 2023-10-09 ENCOUNTER — TELEPHONE (OUTPATIENT)
Facility: CLINIC | Age: 48
End: 2023-10-09

## 2023-10-09 NOTE — TELEPHONE ENCOUNTER
Chay Booth from [facility] called stating the order for the CT scan for chest is denied. Reason for denial the records does not show results of workup to reveal the cause of weight loss. Chay Booth stated there is a option for peer to peer and their number is [phone] and order number is [id number].      [place] number is : [phone]

## 2023-10-23 NOTE — PROGRESS NOTES
Antwan Andres, was evaluated through a synchronous (real-time) audio-video encounter. The patient (or guardian if applicable) is aware that this is a billable service, which includes applicable co-pays. This Virtual Visit was conducted with patient's (and/or legal guardian's) consent. Patient identification was verified, and a caregiver was present when appropriate. The patient was located at Home: [address] [place] [address]  Provider was located at [facility] (Appt Dept): [address] [place] [address] Paola Nava (:  1975) is a Established patient, presenting virtually for evaluation of the following:    Assessment & Plan   Below is the assessment and plan developed based on review of pertinent history, physical exam, labs, studies, and medications. 1. Rapid weight loss  -     Further discussed symptoms with patient with concern for weight loss, nausea, intermittent abdominal pain. Will reorder CT with additional information provided. Awaiting Colonoscopy with GI.  - CT CHEST ABDOMEN PELVIS W WO CONTRAST Additional Contrast? Radiologist Recommendation; Future  2. Left lower quadrant abdominal pain  -     CT CHEST ABDOMEN PELVIS W WO CONTRAST Additional Contrast? Radiologist Recommendation; Future  3. Nausea  -     CT CHEST ABDOMEN PELVIS W WO CONTRAST Additional Contrast? Radiologist Recommendation; Future  4. Red blood cell abnormality  -     CT CHEST ABDOMEN PELVIS W WO CONTRAST Additional Contrast? Radiologist Recommendation; Future  5. Acute pain of right shoulder   Has not yet scheduled with Ortho  6. Acute non-recurrent frontal sinusitis  -    Will treat to r/o bacterial infection given constellation of symptoms. Advised supportive care, Encouraged to return to clinic if symptoms persist or worsen. - azithromycin (ZITHROMAX) 250 MG tablet;  Take 1 tablet by mouth See Admin Instructions for 5 days 500mg on day 1 followed by 250mg on days 2 -

## 2023-10-24 ENCOUNTER — TELEMEDICINE (OUTPATIENT)
Facility: CLINIC | Age: 48
End: 2023-10-24
Payer: COMMERCIAL

## 2023-10-24 DIAGNOSIS — R11.0 NAUSEA: ICD-10-CM

## 2023-10-24 DIAGNOSIS — E83.52 SERUM CALCIUM ELEVATED: ICD-10-CM

## 2023-10-24 DIAGNOSIS — R63.4 RAPID WEIGHT LOSS: Primary | ICD-10-CM

## 2023-10-24 DIAGNOSIS — R71.8 RED BLOOD CELL ABNORMALITY: ICD-10-CM

## 2023-10-24 DIAGNOSIS — R10.32 LEFT LOWER QUADRANT ABDOMINAL PAIN: ICD-10-CM

## 2023-10-24 DIAGNOSIS — J01.10 ACUTE NON-RECURRENT FRONTAL SINUSITIS: ICD-10-CM

## 2023-10-24 DIAGNOSIS — M25.511 ACUTE PAIN OF RIGHT SHOULDER: ICD-10-CM

## 2023-10-24 DIAGNOSIS — R74.8 ELEVATED LIVER ENZYMES: ICD-10-CM

## 2023-10-24 PROCEDURE — 99214 OFFICE O/P EST MOD 30 MIN: CPT | Performed by: FAMILY MEDICINE

## 2023-10-24 RX ORDER — AZITHROMYCIN 250 MG/1
250 TABLET, FILM COATED ORAL SEE ADMIN INSTRUCTIONS
Qty: 6 TABLET | Refills: 0 | Status: SHIPPED | OUTPATIENT
Start: 2023-10-24 | End: 2023-10-29

## 2023-10-24 RX ORDER — OMEPRAZOLE 20 MG/1
CAPSULE, DELAYED RELEASE ORAL
COMMUNITY
Start: 2020-06-04 | End: 2023-10-24 | Stop reason: ALTCHOICE

## 2023-10-24 RX ORDER — ONDANSETRON 4 MG/1
TABLET, FILM COATED ORAL
COMMUNITY
Start: 2023-10-12

## 2023-10-24 SDOH — ECONOMIC STABILITY: INCOME INSECURITY: HOW HARD IS IT FOR YOU TO PAY FOR THE VERY BASICS LIKE FOOD, HOUSING, MEDICAL CARE, AND HEATING?: NOT HARD AT ALL

## 2023-10-24 SDOH — ECONOMIC STABILITY: FOOD INSECURITY: WITHIN THE PAST 12 MONTHS, THE FOOD YOU BOUGHT JUST DIDN'T LAST AND YOU DIDN'T HAVE MONEY TO GET MORE.: NEVER TRUE

## 2023-10-24 SDOH — ECONOMIC STABILITY: FOOD INSECURITY: WITHIN THE PAST 12 MONTHS, YOU WORRIED THAT YOUR FOOD WOULD RUN OUT BEFORE YOU GOT MONEY TO BUY MORE.: NEVER TRUE

## 2023-10-24 ASSESSMENT — PATIENT HEALTH QUESTIONNAIRE - PHQ9
SUM OF ALL RESPONSES TO PHQ9 QUESTIONS 1 & 2: 4
1. LITTLE INTEREST OR PLEASURE IN DOING THINGS: 1
10. IF YOU CHECKED OFF ANY PROBLEMS, HOW DIFFICULT HAVE THESE PROBLEMS MADE IT FOR YOU TO DO YOUR WORK, TAKE CARE OF THINGS AT HOME, OR GET ALONG WITH OTHER PEOPLE: 0
6. FEELING BAD ABOUT YOURSELF - OR THAT YOU ARE A FAILURE OR HAVE LET YOURSELF OR YOUR FAMILY DOWN: 2
3. TROUBLE FALLING OR STAYING ASLEEP: 0
8. MOVING OR SPEAKING SO SLOWLY THAT OTHER PEOPLE COULD HAVE NOTICED. OR THE OPPOSITE, BEING SO FIGETY OR RESTLESS THAT YOU HAVE BEEN MOVING AROUND A LOT MORE THAN USUAL: 0
4. FEELING TIRED OR HAVING LITTLE ENERGY: 3
5. POOR APPETITE OR OVEREATING: 1
SUM OF ALL RESPONSES TO PHQ QUESTIONS 1-9: 11
SUM OF ALL RESPONSES TO PHQ QUESTIONS 1-9: 11
7. TROUBLE CONCENTRATING ON THINGS, SUCH AS READING THE NEWSPAPER OR WATCHING TELEVISION: 1
9. THOUGHTS THAT YOU WOULD BE BETTER OFF DEAD, OR OF HURTING YOURSELF: 0
SUM OF ALL RESPONSES TO PHQ QUESTIONS 1-9: 11
2. FEELING DOWN, DEPRESSED OR HOPELESS: 3
SUM OF ALL RESPONSES TO PHQ QUESTIONS 1-9: 11

## 2023-10-24 NOTE — PATIENT INSTRUCTIONS
You likely have a viral upper respiratory tract infection. Viruses cause many illnesses, such as the common cold, flu, fever, rashes, AND the diarrhea, nausea, and vomiting that are symptoms of a stomach infection. You may wonder if antibiotic medicines could make you feel better. But antibiotics only treat infections caused by bacteria. They don't work on viruses. The good news is that viral infections usually aren't serious. Most will go away in a few days without medical treatment. In the meantime, there are a few things you can do to make yourself more comfortable. Rest if you feel tired  Take an over-the-counter pain medicine if needed, such as acetaminophen (Tylenol), or an NSAID such as ibuprofen (Advil, Motrin), or naproxen (Aleve). Read and follow all instructions on the label. Do not give aspirin to anyone younger than 20. It has been linked to Reye syndrome, a serious illness. Be careful when taking over-the-counter cold or flu medicines and Tylenol at the same time. Many of these medicines have acetaminophen, which is Tylenol. Read the labels to make sure that you are not taking more than the recommended dose. Too much acetaminophen (Tylenol) can be harmful. Mucinex DM/Robitussin DM day and night - suppress cough and thin mucus  Sudafed (ask pharmacist for this) during day - clears head, allows airflow, but can keep you awake at night. Saline nasal spray frequently, or saline sinus rinse daily. Drink plenty of fluids. Stay home from work, school, and other public places while you have a fever and for 24 hours after your fever. Call 911 anytime you think you may need emergency care. For example, call if:    You have severe trouble breathing. You passed out (lost consciousness). Call the office or seek immediate urgent care if:    You seem to be getting much sicker. You have a new or higher fever. You have a severe headache. You have a stiff neck. You have blood in your stools.   You

## 2023-11-06 DIAGNOSIS — E83.52 SERUM CALCIUM ELEVATED: ICD-10-CM

## 2023-11-06 DIAGNOSIS — R74.8 ELEVATED LIVER ENZYMES: ICD-10-CM

## 2023-11-06 DIAGNOSIS — R71.8 RED BLOOD CELL ABNORMALITY: ICD-10-CM

## 2023-11-07 LAB
BASOPHILS # BLD AUTO: 0.1 X10E3/UL (ref 0–0.2)
BASOPHILS NFR BLD AUTO: 2 %
CA-I SERPL ISE-MCNC: 5.3 MG/DL (ref 4.5–5.6)
EOSINOPHIL # BLD AUTO: 0 X10E3/UL (ref 0–0.4)
EOSINOPHIL NFR BLD AUTO: 1 %
ERYTHROCYTE [DISTWIDTH] IN BLOOD BY AUTOMATED COUNT: 12.8 % (ref 11.7–15.4)
HCT VFR BLD AUTO: 45.5 % (ref 34–46.6)
HGB BLD-MCNC: 15.5 G/DL (ref 11.1–15.9)
IMM GRANULOCYTES # BLD AUTO: 0 X10E3/UL (ref 0–0.1)
IMM GRANULOCYTES NFR BLD AUTO: 0 %
LYMPHOCYTES # BLD AUTO: 1.4 X10E3/UL (ref 0.7–3.1)
LYMPHOCYTES NFR BLD AUTO: 39 %
MCH RBC QN AUTO: 33.8 PG (ref 26.6–33)
MCHC RBC AUTO-ENTMCNC: 34.1 G/DL (ref 31.5–35.7)
MCV RBC AUTO: 99 FL (ref 79–97)
MONOCYTES # BLD AUTO: 0.3 X10E3/UL (ref 0.1–0.9)
MONOCYTES NFR BLD AUTO: 10 %
NEUTROPHILS # BLD AUTO: 1.7 X10E3/UL (ref 1.4–7)
NEUTROPHILS NFR BLD AUTO: 48 %
PLATELET # BLD AUTO: 270 X10E3/UL (ref 150–450)
PTH-INTACT SERPL-MCNC: 33 PG/ML (ref 15–65)
RBC # BLD AUTO: 4.59 X10E6/UL (ref 3.77–5.28)
WBC # BLD AUTO: 3.5 X10E3/UL (ref 3.4–10.8)

## 2023-11-09 ENCOUNTER — ANESTHESIA EVENT (OUTPATIENT)
Facility: HOSPITAL | Age: 48
End: 2023-11-09
Payer: COMMERCIAL

## 2023-11-09 ENCOUNTER — HOSPITAL ENCOUNTER (OUTPATIENT)
Facility: HOSPITAL | Age: 48
Setting detail: OUTPATIENT SURGERY
Discharge: HOME | End: 2023-11-09
Attending: INTERNAL MEDICINE | Admitting: INTERNAL MEDICINE
Payer: COMMERCIAL

## 2023-11-09 ENCOUNTER — ANESTHESIA (OUTPATIENT)
Facility: HOSPITAL | Age: 48
End: 2023-11-09
Payer: COMMERCIAL

## 2023-11-09 VITALS
WEIGHT: 131.39 LBS | DIASTOLIC BLOOD PRESSURE: 61 MMHG | SYSTOLIC BLOOD PRESSURE: 96 MMHG | TEMPERATURE: 97.8 F | OXYGEN SATURATION: 100 % | HEIGHT: 64 IN | RESPIRATION RATE: 14 BRPM | BODY MASS INDEX: 22.43 KG/M2 | HEART RATE: 84 BPM

## 2023-11-09 PROCEDURE — 3600007502: Performed by: INTERNAL MEDICINE

## 2023-11-09 PROCEDURE — 2709999900 HC NON-CHARGEABLE SUPPLY: Performed by: INTERNAL MEDICINE

## 2023-11-09 PROCEDURE — 7100000011 HC PHASE II RECOVERY - ADDTL 15 MIN: Performed by: INTERNAL MEDICINE

## 2023-11-09 PROCEDURE — 3700000000 HC ANESTHESIA ATTENDED CARE: Performed by: INTERNAL MEDICINE

## 2023-11-09 PROCEDURE — 3700000001 HC ADD 15 MINUTES (ANESTHESIA): Performed by: INTERNAL MEDICINE

## 2023-11-09 PROCEDURE — 6360000002 HC RX W HCPCS: Performed by: NURSE ANESTHETIST, CERTIFIED REGISTERED

## 2023-11-09 PROCEDURE — 2580000003 HC RX 258: Performed by: INTERNAL MEDICINE

## 2023-11-09 PROCEDURE — 88305 TISSUE EXAM BY PATHOLOGIST: CPT

## 2023-11-09 PROCEDURE — 3600007512: Performed by: INTERNAL MEDICINE

## 2023-11-09 PROCEDURE — 7100000010 HC PHASE II RECOVERY - FIRST 15 MIN: Performed by: INTERNAL MEDICINE

## 2023-11-09 PROCEDURE — 2500000003 HC RX 250 WO HCPCS: Performed by: NURSE ANESTHETIST, CERTIFIED REGISTERED

## 2023-11-09 RX ORDER — SODIUM CHLORIDE 0.9 % (FLUSH) 0.9 %
5-40 SYRINGE (ML) INJECTION EVERY 12 HOURS SCHEDULED
Status: DISCONTINUED | OUTPATIENT
Start: 2023-11-09 | End: 2023-11-09 | Stop reason: HOSPADM

## 2023-11-09 RX ORDER — LIDOCAINE HYDROCHLORIDE 20 MG/ML
INJECTION, SOLUTION EPIDURAL; INFILTRATION; INTRACAUDAL; PERINEURAL PRN
Status: DISCONTINUED | OUTPATIENT
Start: 2023-11-09 | End: 2023-11-09 | Stop reason: SDUPTHER

## 2023-11-09 RX ORDER — PROPOFOL 10 MG/ML
INJECTION, EMULSION INTRAVENOUS PRN
Status: DISCONTINUED | OUTPATIENT
Start: 2023-11-09 | End: 2023-11-09 | Stop reason: SDUPTHER

## 2023-11-09 RX ORDER — SODIUM CHLORIDE 0.9 % (FLUSH) 0.9 %
5-40 SYRINGE (ML) INJECTION PRN
Status: DISCONTINUED | OUTPATIENT
Start: 2023-11-09 | End: 2023-11-09 | Stop reason: HOSPADM

## 2023-11-09 RX ORDER — SODIUM CHLORIDE 9 MG/ML
25 INJECTION, SOLUTION INTRAVENOUS PRN
Status: DISCONTINUED | OUTPATIENT
Start: 2023-11-09 | End: 2023-11-09 | Stop reason: HOSPADM

## 2023-11-09 RX ADMIN — PROPOFOL 100 MG: 10 INJECTION, EMULSION INTRAVENOUS at 14:12

## 2023-11-09 RX ADMIN — PROPOFOL 200 MG: 10 INJECTION, EMULSION INTRAVENOUS at 14:18

## 2023-11-09 RX ADMIN — PROPOFOL 200 MG: 10 INJECTION, EMULSION INTRAVENOUS at 14:09

## 2023-11-09 RX ADMIN — PROPOFOL 100 MG: 10 INJECTION, EMULSION INTRAVENOUS at 14:25

## 2023-11-09 RX ADMIN — PROPOFOL 100 MG: 10 INJECTION, EMULSION INTRAVENOUS at 14:15

## 2023-11-09 RX ADMIN — PROPOFOL 120 MCG/KG/MIN: 10 INJECTION, EMULSION INTRAVENOUS at 14:22

## 2023-11-09 RX ADMIN — LIDOCAINE HYDROCHLORIDE 100 MG: 20 INJECTION, SOLUTION EPIDURAL; INFILTRATION; INTRACAUDAL at 14:09

## 2023-11-09 RX ADMIN — PROPOFOL 150 MG: 10 INJECTION, EMULSION INTRAVENOUS at 14:24

## 2023-11-09 RX ADMIN — SODIUM CHLORIDE: 9 INJECTION, SOLUTION INTRAVENOUS at 14:00

## 2023-11-09 ASSESSMENT — PAIN SCALES - GENERAL
PAINLEVEL_OUTOF10: 0

## 2023-11-09 ASSESSMENT — ENCOUNTER SYMPTOMS
DYSPNEA ACTIVITY LEVEL: AFTER AMBULATING 1 FLIGHT OF STAIRS
SHORTNESS OF BREATH: 1

## 2023-11-09 ASSESSMENT — PAIN - FUNCTIONAL ASSESSMENT: PAIN_FUNCTIONAL_ASSESSMENT: 0-10

## 2023-11-09 NOTE — PROCEDURES
[facility] Clarisse Underwood MD  [phone]            2023          Colonoscopy Operative Report  Jun Johnson  :  1975  [facility] Medical Record Number:  [id number]      Indications:  weight loss, abdominal pain    :  Yessica Felipe MD    Referring Provider: Oval Goldmann, APRN - CNP    Sedation:  MAC    Pre-Procedural Exam:      Airway: clear,  No airway problems anticipated  Heart: RRR, without gallops or rubs  Lungs: clear bilaterally without wheezes, crackles, or rhonchi  Abdomen: soft, nontender, nondistended, bowel sounds present  Mental Status: awake, alert and oriented to person, place and time     Procedure Details:  After informed consent was obtained with all risks and benefits of procedure explained and preoperative exam completed, the patient was taken to the endoscopy suite and placed in the left lateral decubitus position. Upon sequential sedation as per above, a digital rectal exam was performed. The Olympus videocolonoscope  was inserted in the rectum and carefully advanced to the terminal ileum. The quality of preparation was good. The colonoscope was slowly withdrawn with careful inspection and evaluation between folds. Retroflexion in the rectum was performed. The patient tolerated the procedure well. Findings:   Terminal Ileum: Normal  Cecum: Normal  Ascending Colon: Normal  Transverse Colon: Normal  Descending Colon: Normal  Sigmoid: Severe diverticulosis. No evidence of diverticulitis. Rectum:  Normal on antegrade and retroflexed views. Multiple pull throughs were done through the cecum and ascending colon. Interventions:  None    Specimen Removed:   None    Complications: None. EBL:  None. Impression:      -Diverticulosis. -Normal terminal ileum.  -Exam otherwise normal.    Recommendations:    -CT Chest/A/P on   -Avoidance of NSAIDs.     Discharge Disposition:  Home in the company of a  when able to

## 2023-11-09 NOTE — DISCHARGE INSTRUCTIONS
[address] Carley Delgado M.D.  [phone]                 COLON and EGD DISCHARGE INSTRUCTIONS    2023    Sulma Cosby  :  1975  [facility] Medical Record Number:  [id number]      DISCOMFORT:  Sore throat- throat lozenges or warm salt water gargle  Redness at IV site- apply warm compress to area; if redness or soreness persist- contact your physician  There may be a slight amount of blood passed from the rectum  Gaseous discomfort- walking, belching will help relieve any discomfort  You may not operate a vehicle for 12 hours  You may not engage in an occupation involving machinery or appliances for rest of today  You may not drink alcoholic beverages for at least 12 hours  Avoid making any critical decisions for at least 24 hour  DIET:   Advance diet as tolerated. - however -  remember your colon is empty and a heavy meal will produce gas. Avoid these foods:  vegetables, fried / greasy foods, carbonated drinks for today     ACTIVITY:  You may  resume your normal daily activities it is recommended that you spend the remainder of the day resting -  avoid any strenuous activity and driving. CALL M.D. ANY SIGN OF:   Increasing pain, nausea, vomiting  Abdominal distension (swelling)  New increased bleeding (oral or rectal)  Fever (chills)  Pain in chest area  Bloody discharge from nose or mouth  Shortness of breath      Follow-up Instructions:   Call Dr. Stephanie Delgado if any questions at [phone]. Results of procedure / biopsy in 7 to 10 days, we will call you with these results. Your endoscopy showed severe gastritis (inflammation in the stomach) which was biopsied. Your colonoscopy showed diverticulosis but no findings to explain symptoms. Avoid ALL NSAIDs.

## 2023-11-09 NOTE — PROCEDURES
2 [facility] Jazimn Jung MD  [phone]           2023                EGD Operative Report  Marley Gould  :  1975  Chuck [facility]aniego Medical Record Number:  [id number]      Indication: weight loss, abdominal pain    : Demetrice Wilson MD    Referring Provider:  ZEUS Smith CNP    Anesthesia/Sedation:  MAC    Airway assessment: No airway problems anticipated    Pre-Procedural Exam:      Airway: clear, no airway problems anticipated  Heart: RRR, without gallops or rubs  Lungs: clear bilaterally without wheezes, crackles, or rhonchi  Abdomen: soft, nontender, nondistended, bowel sounds present  Mental Status: awake, alert and oriented to person, place and time       Procedure Details     After infomed consent was obtained for the procedure, with all risks and benefits of procedure explained the patient was taken to the endoscopy suite and placed in the left lateral decubitus position. Following sequential administration of sedation as per above, the endoscope was inserted into the mouth and advanced under direct vision to second portion of the duodenum. A careful inspection was made as the gastroscope was withdrawn, including a retroflexed view of the proximal stomach; findings and interventions are described below. Findings:   Esophagus: Z line regular at 34cm. Biopsies taken in mid and distal esophagus. Stomach: Evidence of prior gastric surgery c/w gastric sleeve. Gastritis with evidence of bleeding. Biopsied. 5cm hiatal hernia. Duodenum: Normal.    Therapies:  None    Specimens:     1) Gastric Biopsies  2) Distal esophagus biopsy  3) Proximal esophagus biopsy           Complications:   None; patient tolerated the procedure well. EBL:  Minimal           Impression:        -Normal esophagus. Biopsies taken to rule out EoE in the distal and mid esophagus.  -Evidence of sleeve gastrectomy  -Severe gastritis.   Biopsied.  -5cm hiatal

## 2023-11-09 NOTE — PROGRESS NOTES
Samuel Barreto  1975  [id number]    Situation:  Verbal report received from: Lucio Goyal RN   Procedure: Procedure(s):  COLONOSCOPY  ESOPHAGOGASTRODUODENOSCOPY  EGD BIOPSY    Background:    Preoperative diagnosis: Colon cancer screening [Z12.11]  Esophageal dysphagia [R13.19]  Postoperative diagnosis: * No post-op diagnosis entered *    :  Dr. Natalia Arrieta   Assistant(s): Circulator: Kaylen Frances RN  Endoscopy Technician: Omer Song    Specimens:   ID Type Source Tests Collected by Time Destination   1 : gastric biopsy Tissue Gastric SURGICAL PATHOLOGY Jorge Luis Fowler MD 11/9/2023 1416    2 : distal esophagus biopsy Tissue Esophagus SURGICAL PATHOLOGY Jorge Luis Fowler MD 11/9/2023 1418    3 : mid esophagus biopsy Tissue Esophagus SURGICAL PATHOLOGY Jorge Luis Fowler MD 11/9/2023 1418      H. Pylori    no    Assessment:  Intra-procedure medications g    Anesthesia gave intra-procedure sedation and medications, see anesthesia flow sheet   yes    Intravenous fluids: NS@ KVO     Vital signs stable   yes    Abdominal assessment: round and soft   yes    Recommendation:  Discharge patient per MD order  yes.   Return to floor  outpatient  Family or Friend   spouse   Permission to share finding with family or friend   yes

## 2023-11-09 NOTE — PROGRESS NOTES
1409  Timeout performed. Anesthesia staff at patient's bedside administering anesthesia and monitoring patients vital signs throughout procedure. See anesthesia note. Post procedure, report received from CRNA,    Susan Harrison. 46  Endoscope was pre-cleaned at bedside immediately following procedure by [employer],    [address] [place] [address]  Patient tolerated procedure. Abdomen soft and patient arousable and voices no complaints. Patient transported to endoscopy recovery area. Report given to post procedure RNCarla.

## 2023-11-09 NOTE — PROGRESS NOTES
Endoscopy discharge instructions have been reviewed and given to patient . The patient verbalized understanding and acceptance of instructions. Dr. Winsome Beltre  discussed with spouse procedure findings and next steps.

## 2023-11-10 LAB — PTH RELATED PROT SERPL-SCNC: <2 PMOL/L

## 2023-11-10 NOTE — ANESTHESIA POSTPROCEDURE EVALUATION
Department of Anesthesiology  Postprocedure Note    Patient: Ziggy Waite  MRN: [id number]  YOB: 1975  Date of evaluation: 11/9/2023      Procedure Summary     Date: 11/09/23 Room / Location: [facility] 02 / [facility] ENDOSCOPY    Anesthesia Start: 1407 Anesthesia Stop: 8327    Procedures:       COLONOSCOPY (Lower GI Region)      ESOPHAGOGASTRODUODENOSCOPY (Upper GI Region)      EGD BIOPSY (Upper GI Region) Diagnosis:       Colon cancer screening      Esophageal dysphagia      (Colon cancer screening [Z12.11])      (Esophageal dysphagia [R13.19])    Surgeons: Vasu Gardner MD Responsible Provider: Chasidy Piper MD    Anesthesia Type: MAC ASA Status: 2          Anesthesia Type: No value filed. Ramu Phase I: Ramu Score: 10    Ramu Phase II: Ramu Score: 10      Anesthesia Post Evaluation    Level of consciousness: awake and alert  Pain score: 0  Airway patency: patent  Nausea & Vomiting: no vomiting and no nausea  Complications: no  Cardiovascular status: hemodynamically stable  Respiratory status: room air  Hydration status: stable  There was medical reason for not using a multimodal analgesia pain management approach.

## 2023-11-17 RX ORDER — CYCLOBENZAPRINE HCL 10 MG
10 TABLET ORAL 2 TIMES DAILY PRN
Qty: 20 TABLET | Refills: 0 | Status: SHIPPED | OUTPATIENT
Start: 2023-11-17

## 2023-11-17 NOTE — TELEPHONE ENCOUNTER
PCP: Jessenia Su, APRN - CNP    Last appt: [unfilled]  Future Appointments   Date Time Provider [address]   11/20/2023  8:20 AM [facility] 2 [facility]   11/20/2023  9:00 AM SSR CT 1 [facility]       Requested Prescriptions     Pending Prescriptions Disp Refills    cyclobenzaprine (FLEXERIL) 10 MG tablet [Pharmacy Med Name: CYCLOBENZAPRINE HCL 10MG TAB] 20 tablet 0     Sig: TAKE 1 TABLET BY MOUTH 2 TIMES DAILY AS NEEDED FOR MUSCLE SPASMS       Prior labs and Blood pressures:  BP Readings from Last 3 Encounters:   11/09/23 96/61   09/19/23 135/86     Lab Results   Component Value Date/Time     09/19/2023 02:53 PM    K 4.4 09/19/2023 02:53 PM     09/19/2023 02:53 PM    CO2 25 09/19/2023 02:53 PM    BUN 7 09/19/2023 02:53 PM    GFRAA >60 04/02/2021 11:13 AM     No results found for: \"HBA1C\", \"[id number]\"  Lab Results   Component Value Date/Time    CHOL 228 09/19/2023 02:53 PM     09/19/2023 02:53 PM     No results found for: \"VITD3\", \"VD3RIA\"    Lab Results   Component Value Date/Time    TSH 1.650 10/18/2022 08:37 AM

## 2023-11-20 ENCOUNTER — HOSPITAL ENCOUNTER (OUTPATIENT)
Facility: HOSPITAL | Age: 48
Discharge: HOME | End: 2023-11-23
Payer: COMMERCIAL

## 2023-11-20 VITALS
HEART RATE: 67 BPM | OXYGEN SATURATION: 100 % | RESPIRATION RATE: 20 BRPM | DIASTOLIC BLOOD PRESSURE: 73 MMHG | SYSTOLIC BLOOD PRESSURE: 114 MMHG

## 2023-11-20 VITALS — WEIGHT: 131 LBS | BODY MASS INDEX: 22.36 KG/M2 | HEIGHT: 64 IN

## 2023-11-20 DIAGNOSIS — Z12.31 BREAST CANCER SCREENING BY MAMMOGRAM: ICD-10-CM

## 2023-11-20 DIAGNOSIS — R63.4 RAPID WEIGHT LOSS: ICD-10-CM

## 2023-11-20 DIAGNOSIS — R11.0 NAUSEA: ICD-10-CM

## 2023-11-20 DIAGNOSIS — R10.32 LEFT LOWER QUADRANT ABDOMINAL PAIN: ICD-10-CM

## 2023-11-20 DIAGNOSIS — R71.8 RED BLOOD CELL ABNORMALITY: ICD-10-CM

## 2023-11-20 LAB
GLUCOSE BLD STRIP.AUTO-MCNC: 125 MG/DL (ref 65–100)
PERFORMED BY:: ABNORMAL

## 2023-11-20 PROCEDURE — 77063 BREAST TOMOSYNTHESIS BI: CPT

## 2023-11-20 PROCEDURE — 6360000004 HC RX CONTRAST MEDICATION: Performed by: FAMILY MEDICINE

## 2023-11-20 PROCEDURE — 82962 GLUCOSE BLOOD TEST: CPT

## 2023-11-20 PROCEDURE — 74177 CT ABD & PELVIS W/CONTRAST: CPT

## 2023-11-20 RX ADMIN — IOPAMIDOL 100 ML: 755 INJECTION, SOLUTION INTRAVENOUS at 11:11

## 2023-11-20 NOTE — PROGRESS NOTES
Here for outpt CT w contrast. At time of IV insert by [facility] pt c/o nausea and feeling flushed and faint. IR nurse asked to come assess. Pt was pale sitting in IV start chair. Pt refusing offer to place on stretcher at this time. Vitals and bg level obtained (see chart) cold compress to head. Pt regained color to face and reported feeling better. IV placed by RN and pt escorted to [facility]

## 2023-11-21 NOTE — RESULT ENCOUNTER NOTE
Other than a finding of a small hiatal hernia, your CT scan looks normal.  They checked the thyroid, esophagus, heart, lungs, liver, gallbladder, pancreas, stomach, bladder, etc.  I hope you are feeling well.  Many thanks, Perri Bobo [facility]

## 2023-11-24 DIAGNOSIS — R74.8 ELEVATED LIVER ENZYMES: Primary | ICD-10-CM

## 2023-12-19 NOTE — PROGRESS NOTES
Consent: Ellie Colindres, who was seen by synchronous (real-time) audio-video technology, and/or her healthcare decision maker, is aware that this patient-initiated, Telehealth encounter on 10/15/2020 is a billable service, with coverage as determined by her insurance carrier. She is aware that she may receive a bill and has provided verbal consent to proceed: Yes. Assessment & Plan:   Diagnoses and all orders for this visit:    1. Tinea pedis of both feet  -     terbinafine HCL (LAMISIL) 250 mg tablet; Take 1 Tab by mouth daily. Indications: athlete's foot    2. Cellulitis of left upper extremity  -     clotrimazole-betamethasone (LOTRISONE) topical cream; Apply  to affected area two (2) times a day. Follow-up and Dispositions    · Return in about 2 weeks (around 10/29/2020), or if symptoms worsen or fail to improve, for follow up. I ADVISED PATIENT TO GO TO ER IF SYMPTOMS WORSEN , CHANGE OR FAILS TO IMPROVE. I spent at least 15 minutes with this established patient, and >50% of the time was spent counseling and/or coordinating care regarding SEE BELOW  712  Subjective:   Ellie Colindres is a 50 y.o. female who was seen for Foot Pain (Lt foot pain x 2 weeks )      1. Tinea pedis of both feet  Patient has noticed rash on left foot. This started 2 weeks ago. She describes rash as peeling and scaling of the sole of left foot. She reports this is spreading and is also present on the right foot now. She is unable to put her shoes on and unable to walk. There is no discharge. She denies fever chills. 2. Cellulitis of left upper extremity  Patient requests refills of Lotrisone. She is also applying it to her foot. Prior to Admission medications    Medication Sig Start Date End Date Taking? Authorizing Provider   terbinafine HCL (LAMISIL) 250 mg tablet Take 1 Tab by mouth daily.  Indications: athlete's foot 10/15/20  Yes Sreekanth Pappas MD   clotrimazole-betamethasone (Lesley Phoenix) Please see the attached refill request.  I have never seen this patient.  She has canceled multiple appointment with me.  I am forwarding this refill request to you.     topical cream Apply  to affected area two (2) times a day. 10/15/20  Yes Jayce Wilks MD   amLODIPine (NORVASC) 10 mg tablet Take 1 Tab by mouth daily. 9/14/20  Yes Jayce Wilks MD   topiramate (TOPAMAX) 50 mg tablet Take 1 Tab by mouth two (2) times a day. Indications: migraine prevention 8/27/20  Yes Jayce Wilks MD   sertraline (ZOLOFT) 50 mg tablet Take 1 Tab by mouth daily. Indications: repeated episodes of anxiety 8/27/20  Yes Jayce Wilks MD   atenoloL (TENORMIN) 50 mg tablet  5/21/20  Yes Provider, Historical   busPIRone (BUSPAR) 10 mg tablet Take 1 Tab by mouth three (3) times daily. Indications: repeated episodes of anxiety 7/8/20  Yes Jayce Wilks MD   diclofenac EC (VOLTAREN) 50 mg EC tablet Take 1 Tab by mouth two (2) times a day. 7/8/20  Yes Jayce Wilks MD   traZODone (DESYREL) 100 mg tablet Take 2 Tabs by mouth nightly as needed for Sleep. 7/8/20  Yes Jayce Wilks MD   Blood Pressure Test Kit-Large ([employer] [facility] Talking Arm BP Monitr) kit Check bp daily 7/8/20  Yes Jayce Wilks MD   omeprazole (PRILOSEC) 20 mg capsule Take 20 mg by mouth daily as needed (if heartburn persists after first dose). Yes Provider, Historical   cyclobenzaprine (FLEXERIL) 5 mg tablet Take 10 mg by mouth nightly as needed for Muscle Spasm(s). Yes Provider, Historical   clotrimazole-betamethasone (LOTRISONE) topical cream Apply  to affected area two (2) times a day. 9/28/20 10/15/20  Jayce Wilks MD   albuterol (PROVENTIL HFA, VENTOLIN HFA, PROAIR HFA) 90 mcg/actuation inhaler  3/22/20   Provider, Historical   esomeprazole magnesium (NEXIUM PO) Take 20 mg by mouth two (2) times a day. Provider, Historical   linaclotide (LINZESS PO) Take  by mouth. Provider, Historical     Allergies   Allergen Reactions    Morphine Anaphylaxis     July 5, 2016 administered after surgery for pain.      Bacitracin Other (comments)    Doxycycline Hives    Morphine Unknown (comments)    Other Medication Palpitations     Peripheral Block patient noted difficulty breathing and palpitations.        Patient Active Problem List   Diagnosis Code    SOB (shortness of breath) R06.02    Chronic back pain M54.9, G89.29    Obesity E66.9    Lyme disease A69.20    Anxiety F41.9    Diverticular disease K57.90    GERD (gastroesophageal reflux disease) K21.9    Foot pain, bilateral M79.671, M79.672    Unspecified hereditary and idiopathic peripheral neuropathy G60.9    TMJ tenderness M26.629    Sinusitis J32.9    New daily persistent headache G44.52    Chest pain with normal coronary angiography R07.9    Morbid obesity with BMI of 45.0-49.9, adult ([facility]) E66.01, Z68.42    Elevated pulse rate R00.0    Cellulitis and abscess of neck L03.221, L02.11    Controlled type 2 diabetes mellitus without complication ([facility]) E10.9    Sepsis ([facility]) A41.9    Migraine without status migrainosus, not intractable G43.909    Anxiety F41.9    Gastroparesis K31.84    Other hyperlipidemia E78.49    Controlled type 2 diabetes mellitus without complication, without long-term current use of insulin ([facility]) E11.9    Hearing loss H91.90     Patient Active Problem List    Diagnosis Date Noted    Hearing loss 07/10/2020    Migraine without status migrainosus, not intractable 05/20/2020    Anxiety 05/20/2020    Gastroparesis 05/20/2020    Other hyperlipidemia 05/20/2020    Controlled type 2 diabetes mellitus without complication, without long-term current use of insulin [facility] 05/20/2020    Cellulitis and abscess of neck 12/03/2016    Controlled type 2 diabetes mellitus without complication ([facility] 04/53/3383    Sepsis ([facility] 12/03/2016    Elevated pulse rate 08/31/2015    Chest pain with normal coronary angiography 07/28/2015    Morbid obesity with BMI of 45.0-49.9, adult ([facility] 07/28/2015    TMJ tenderness 06/01/2015    Sinusitis 06/01/2015    New daily persistent headache 06/01/2015    Foot pain, bilateral 04/08/2015    Unspecified hereditary and idiopathic peripheral neuropathy 04/08/2015    Chronic back pain 03/13/2014    Obesity 03/13/2014    Lyme disease 03/13/2014    Anxiety 03/13/2014    Diverticular disease 03/13/2014    GERD (gastroesophageal reflux disease) 03/13/2014    SOB (shortness of breath) 03/12/2014     Current Outpatient Medications   Medication Sig Dispense Refill    terbinafine HCL (LAMISIL) 250 mg tablet Take 1 Tab by mouth daily. Indications: athlete's foot 14 Tab 0    clotrimazole-betamethasone (LOTRISONE) topical cream Apply  to affected area two (2) times a day. 15 g 2    amLODIPine (NORVASC) 10 mg tablet Take 1 Tab by mouth daily. 90 Tab 1    topiramate (TOPAMAX) 50 mg tablet Take 1 Tab by mouth two (2) times a day. Indications: migraine prevention 60 Tab 5    sertraline (ZOLOFT) 50 mg tablet Take 1 Tab by mouth daily. Indications: repeated episodes of anxiety 30 Tab 3    atenoloL (TENORMIN) 50 mg tablet       busPIRone (BUSPAR) 10 mg tablet Take 1 Tab by mouth three (3) times daily. Indications: repeated episodes of anxiety 90 Tab 5    diclofenac EC (VOLTAREN) 50 mg EC tablet Take 1 Tab by mouth two (2) times a day. 30 Tab 0    traZODone (DESYREL) 100 mg tablet Take 2 Tabs by mouth nightly as needed for Sleep. 60 Tab 5    Blood Pressure Test Kit-Large ([employer] Talking Arm BP Monitr) kit Check bp daily 1 Kit 0    omeprazole (PRILOSEC) 20 mg capsule Take 20 mg by mouth daily as needed (if heartburn persists after first dose).  cyclobenzaprine (FLEXERIL) 5 mg tablet Take 10 mg by mouth nightly as needed for Muscle Spasm(s).  albuterol (PROVENTIL HFA, VENTOLIN HFA, PROAIR HFA) 90 mcg/actuation inhaler       esomeprazole magnesium (NEXIUM PO) Take 20 mg by mouth two (2) times a day.  linaclotide (LINZESS PO) Take  by mouth. Allergies   Allergen Reactions    Morphine Anaphylaxis     July 5, 2016 administered after surgery for pain.      Bacitracin Other (comments)    Doxycycline Hives    Morphine Unknown (comments)    Other Medication Palpitations     Peripheral Block patient noted difficulty breathing and palpitations. Past Medical History:   Diagnosis Date    Arrhythmia     PAC    Arthritis     Cellulitis     cellulitis    Chest pain     Diabetes ([address]     GERD (gastroesophageal reflux disease)     H/O seasonal allergies     Headache     Hearing loss 7/10/2020    Ill-defined condition     \"twisted\" kidney right    Lyme disease     Obesity     Plantar fasciitis     PUD (peptic ulcer disease)     Trigger finger, right index finger 2016    Vitamin D deficiency      Past Surgical History:   Procedure Laterality Date    HX GASTRIC BYPASS      HX GI      HX HEART CATHETERIZATION  7/28/15    HX ORTHOPAEDIC  7-30-15    Rt. rotator cuff repair     HX TONSILLECTOMY      SD COLSC FLX W/NDSC US XM RCTM ET AL LMTD&ADJ [facility]       Family History   Problem Relation Age of Onset    Cancer Paternal Aunt         colon polyps    Cancer Paternal Uncle         colon cancer    Stroke Mother     Heart Disease Mother     Elevated Lipids Mother     Cancer Father         prostate    Heart Disease Father         death by MI    Coronary Artery Disease Other         mother  64, father age 67 of MI    Other Brother         Passed while sleeping at 64, neck blockages and legs, hx of vascular surg    Hypertension Brother     Diabetes Brother     Anesth Problems Neg Hx      Social History     Tobacco Use    Smoking status: Current Some Day Smoker     Packs/day: 0.50    Smokeless tobacco: Never Used   Substance Use Topics    Alcohol use: Not Currently     Comment: rare       Review of Systems   Constitutional: Negative. HENT: Negative. Eyes: Negative. Respiratory: Negative. Cardiovascular: Negative. Gastrointestinal: Negative. Genitourinary: Negative. Musculoskeletal: Negative. Skin: Positive for itching and rash.    Neurological: Negative. Endo/Heme/Allergies: Negative. Psychiatric/Behavioral: Negative. Objective:     Patient-Reported Vitals 10/15/2020   Patient-Reported Weight 175lb   Patient-Reported Height -        [INSTRUCTIONS:  \"[x]\" Indicates a positive item  \"[]\" Indicates a negative item  -- DELETE ALL ITEMS NOT EXAMINED]    Constitutional: [x] Appears well-developed and well-nourished [x] No apparent distress      [] Abnormal -     Mental status: [x] Alert and awake  [x] Oriented to person/place/time [x] Able to follow commands    [] Abnormal -     Eyes:   EOM    [x]  Normal    [] Abnormal -   Sclera  [x]  Normal    [] Abnormal -          Discharge [x]  None visible   [] Abnormal -     HENT: [x] Normocephalic, atraumatic  [] Abnormal -   [x] Mouth/Throat: Mucous membranes are moist    External Ears [x] Normal  [] Abnormal -    Neck: [x] No visualized mass [] Abnormal -     Pulmonary/Chest: [x] Respiratory effort normal   [x] No visualized signs of difficulty breathing or respiratory distress        [] Abnormal -      Musculoskeletal:   [x] Normal gait with no signs of ataxia         [x] Normal range of motion of neck        [] Abnormal -     Neurological:        [x] No Facial Asymmetry (Cranial nerve 7 motor function) (limited exam due to video visit)          [x] No gaze palsy        [] Abnormal -          Skin:        [] No significant exanthematous lesions or discoloration noted on facial skin         [x] Abnormal -peeling skin of sole of left foot noted via video           Psychiatric:       [x] Normal Affect [] Abnormal -        [x] No Hallucinations    Other pertinent observable physical exam findings:-    We discussed the expected course, resolution and complications of the diagnosis(es) in detail. Medication risks, benefits, costs, interactions, and alternatives were discussed as indicated. I advised her to contact the office if her condition worsens, changes or fails to improve as anticipated.  She expressed understanding with the diagnosis(es) and plan. Baldomero Meigs is a 50 y.o. female being evaluated by a video visit encounter for concerns as above. A caregiver was present when appropriate. Due to this being a TeleHealth encounter (During [facility] public health emergency), evaluation of the following organ systems was limited: Vitals/Constitutional/EENT/Resp/CV/GI//MS/Neuro/Skin/Heme-Lymph-Imm. Pursuant to the emergency declaration under the [address] [place] [address], Replaced by [facility] waiver authority and the [employer] Act, this Virtual  Visit was conducted, with patient's (and/or legal guardian's) consent, to reduce the patient's risk of exposure to COVID-19 and provide necessary medical care. Services were provided through a video synchronous discussion virtually to substitute for in-person clinic visit. Patient was located at her home. I was at office while conducting this encounter.        Glorious Severin, MD

## 2024-01-04 ENCOUNTER — TELEMEDICINE (OUTPATIENT)
Facility: CLINIC | Age: 49
End: 2024-01-04
Payer: COMMERCIAL

## 2024-01-04 DIAGNOSIS — R63.4 RAPID WEIGHT LOSS: Primary | ICD-10-CM

## 2024-01-04 DIAGNOSIS — R74.8 ELEVATED LIVER ENZYMES: ICD-10-CM

## 2024-01-04 DIAGNOSIS — J06.9 VIRAL URI: ICD-10-CM

## 2024-01-04 DIAGNOSIS — R10.32 LEFT LOWER QUADRANT ABDOMINAL PAIN: ICD-10-CM

## 2024-01-04 DIAGNOSIS — R71.8 RED BLOOD CELL ABNORMALITY: ICD-10-CM

## 2024-01-04 PROCEDURE — 99213 OFFICE O/P EST LOW 20 MIN: CPT | Performed by: FAMILY MEDICINE

## 2024-01-04 RX ORDER — LINACLOTIDE 72 UG/1
CAPSULE, GELATIN COATED ORAL
COMMUNITY
Start: 2023-12-22

## 2024-01-04 RX ORDER — SUCRALFATE 1 G/1
TABLET ORAL
COMMUNITY
Start: 2023-11-17

## 2024-01-04 ASSESSMENT — ENCOUNTER SYMPTOMS
NAUSEA: 1
BLOOD IN STOOL: 0
SHORTNESS OF BREATH: 0
CONSTIPATION: 0
COUGH: 0
ABDOMINAL PAIN: 1
CHEST TIGHTNESS: 0
SINUS PAIN: 0
DIARRHEA: 0

## 2024-01-04 ASSESSMENT — PATIENT HEALTH QUESTIONNAIRE - PHQ9
SUM OF ALL RESPONSES TO PHQ QUESTIONS 1-9: 3
1. LITTLE INTEREST OR PLEASURE IN DOING THINGS: 0
5. POOR APPETITE OR OVEREATING: 0
2. FEELING DOWN, DEPRESSED OR HOPELESS: 1
9. THOUGHTS THAT YOU WOULD BE BETTER OFF DEAD, OR OF HURTING YOURSELF: 0
10. IF YOU CHECKED OFF ANY PROBLEMS, HOW DIFFICULT HAVE THESE PROBLEMS MADE IT FOR YOU TO DO YOUR WORK, TAKE CARE OF THINGS AT HOME, OR GET ALONG WITH OTHER PEOPLE: 0
6. FEELING BAD ABOUT YOURSELF - OR THAT YOU ARE A FAILURE OR HAVE LET YOURSELF OR YOUR FAMILY DOWN: 0
3. TROUBLE FALLING OR STAYING ASLEEP: 1
4. FEELING TIRED OR HAVING LITTLE ENERGY: 1
SUM OF ALL RESPONSES TO PHQ QUESTIONS 1-9: 3
SUM OF ALL RESPONSES TO PHQ QUESTIONS 1-9: 3
8. MOVING OR SPEAKING SO SLOWLY THAT OTHER PEOPLE COULD HAVE NOTICED. OR THE OPPOSITE, BEING SO FIGETY OR RESTLESS THAT YOU HAVE BEEN MOVING AROUND A LOT MORE THAN USUAL: 0
7. TROUBLE CONCENTRATING ON THINGS, SUCH AS READING THE NEWSPAPER OR WATCHING TELEVISION: 0
SUM OF ALL RESPONSES TO PHQ9 QUESTIONS 1 & 2: 1
SUM OF ALL RESPONSES TO PHQ QUESTIONS 1-9: 3

## 2024-01-04 NOTE — PROGRESS NOTES
dentified pt with two pt identifiers(name and ).    Chief Complaint   Patient presents with    Fever        Health Maintenance Due   Topic    Hepatitis B vaccine (1 of 3 - 3-dose series)    COVID-19 Vaccine (1)    Pneumococcal 0-64 years Vaccine (1 - PCV)    Diabetic retinal exam     DTaP/Tdap/Td vaccine (1 - Tdap)    Diabetic Alb to Cr ratio (uACR) test     Diabetic foot exam     Shingles vaccine (1 of 2)    Flu vaccine (1)       Wt Readings from Last 3 Encounters:   23 59.4 kg (131 lb)   23 59.6 kg (131 lb 6.3 oz)   23 59.9 kg (132 lb)     Temp Readings from Last 3 Encounters:   23 97.8 °F (36.6 °C) (Oral)   23 97.2 °F (36.2 °C) (Skin)     BP Readings from Last 3 Encounters:   23 114/73   23 96/61   23 135/86     Pulse Readings from Last 3 Encounters:   23 67   23 84   23 (!) 114           Coordination of Care Questionnaire:  :   1. \"Have you been to the ER, urgent care clinic since your last visit?  Hospitalized since your last visit?\" no    2. \"Have you seen or consulted any other health care providers outside of the [facility] since your last visit?\" no     3. For patients aged 45-75: Has the patient had a colonoscopy / FIT/ Cologuard? Yes      If the patient is female:    4. For patients aged 40-74: Has the patient had a mammogram within the past 2 years? yes      5. For patients aged 21-65: Has the patient had a pap smear? yes     3) Do you have an Advance Directive on file? no  Are you interested in receiving information about Advance Directives? no    Patient is accompanied by self I have received verbal consent from Shweta Greene to discuss any/all medical information while they are present in the room.   
unintentional weight loss over the last year over 30 pounds, anorexia.  She has some occasional L side pain. Last week she reported low back pain but reports this resolved. She is drinking bere seltzer which she reports soothes her stomach r/t nausea. She is taking a \"nausea pill\" from Dr. White, which is helpful. Patient is a previous smoker, denied bladder changes, admits constipation, overdue for colonoscopy but has scheduled 11/9 with Dr. White and mammogram. Labs, CT of chest abdomen pelvis were also ordered.  One of her liver numbers was slightly elevated. Pt had COVID last spring and reports symptoms started after that.     Pt reports hx of shoulder surgery. She has not scheduled with Orthopedic surgeon yet.     Pt is a former smoker but reports since COVID she \"only vapes.\" She admits some chest congestion today, x 1.5 weeks. She reports this symptom worsened significantly Saturday. She denies any fevers. She reports persistent headaches.       Review of Systems   Constitutional:  Positive for unexpected weight change. Negative for activity change, fatigue and fever.   HENT:  Negative for congestion and sinus pain.    Eyes:  Negative for visual disturbance.   Respiratory:  Negative for cough, chest tightness and shortness of breath.    Cardiovascular:  Negative for chest pain and palpitations.   Gastrointestinal:  Positive for abdominal pain and nausea. Negative for blood in stool, constipation and diarrhea.   Genitourinary:  Negative for hematuria.   Musculoskeletal:  Negative for myalgias.   Skin:  Negative for rash.   Neurological:  Negative for dizziness and tremors.   Psychiatric/Behavioral:  Negative for behavioral problems, confusion and suicidal ideas.           Objective   Patient-Reported Vitals  No data recorded     Physical Exam  [INSTRUCTIONS:  \"[x]\" Indicates a positive item  \"[]\" Indicates a negative item  -- DELETE ALL ITEMS NOT EXAMINED]    Constitutional: [x] Appears well-developed and

## 2024-01-05 ENCOUNTER — TELEPHONE (OUTPATIENT)
Facility: CLINIC | Age: 49
End: 2024-01-05

## 2024-01-05 NOTE — TELEPHONE ENCOUNTER
Pt is requesting a prescription to help her sleep. Pt forgot to ask at the appt from yesterday the 4th of Jan.      Please advise

## 2024-02-07 ENCOUNTER — APPOINTMENT (OUTPATIENT)
Facility: HOSPITAL | Age: 49
End: 2024-02-07
Payer: COMMERCIAL

## 2024-02-07 ENCOUNTER — HOSPITAL ENCOUNTER (EMERGENCY)
Facility: HOSPITAL | Age: 49
Discharge: HOME | End: 2024-02-07
Attending: EMERGENCY MEDICINE
Payer: COMMERCIAL

## 2024-02-07 VITALS
BODY MASS INDEX: 22.2 KG/M2 | DIASTOLIC BLOOD PRESSURE: 84 MMHG | RESPIRATION RATE: 16 BRPM | HEIGHT: 64 IN | HEART RATE: 70 BPM | OXYGEN SATURATION: 96 % | WEIGHT: 130 LBS | TEMPERATURE: 97.4 F | SYSTOLIC BLOOD PRESSURE: 112 MMHG

## 2024-02-07 DIAGNOSIS — R82.81 STERILE PYURIA: ICD-10-CM

## 2024-02-07 DIAGNOSIS — N20.1 URETEROLITHIASIS: Primary | ICD-10-CM

## 2024-02-07 LAB
ALBUMIN SERPL-MCNC: 3.8 G/DL (ref 3.5–5.2)
ALBUMIN/GLOB SERPL: 1.2 (ref 1.1–2.2)
ALP SERPL-CCNC: 98 U/L (ref 35–104)
ALT SERPL-CCNC: 8 U/L (ref 10–35)
ANION GAP SERPL CALC-SCNC: 12 MMOL/L (ref 5–15)
APPEARANCE UR: CLEAR
AST SERPL-CCNC: 26 U/L (ref 10–35)
BACTERIA URNS QL MICRO: NEGATIVE /HPF
BASOPHILS # BLD: 0.1 K/UL (ref 0–1)
BASOPHILS NFR BLD: 2 % (ref 0–1)
BILIRUB SERPL-MCNC: 0.2 MG/DL (ref 0.2–1)
BILIRUB UR QL: NEGATIVE
BUN SERPL-MCNC: 6 MG/DL (ref 6–20)
BUN/CREAT SERPL: 9 (ref 12–20)
CALCIUM SERPL-MCNC: 9.3 MG/DL (ref 8.6–10)
CHLORIDE SERPL-SCNC: 100 MMOL/L (ref 98–107)
CO2 SERPL-SCNC: 28 MMOL/L (ref 22–29)
COLOR UR: ABNORMAL
CREAT SERPL-MCNC: 0.7 MG/DL (ref 0.5–0.9)
DIFFERENTIAL METHOD BLD: ABNORMAL
EOSINOPHIL # BLD: 0.2 K/UL (ref 0–0.4)
EOSINOPHIL NFR BLD: 4 %
EPITH CASTS URNS QL MICRO: ABNORMAL /LPF
ERYTHROCYTE [DISTWIDTH] IN BLOOD BY AUTOMATED COUNT: 12.7 % (ref 11.5–14.5)
GLOBULIN SER CALC-MCNC: 3.3 G/DL (ref 2–4)
GLUCOSE SERPL-MCNC: 92 MG/DL (ref 65–100)
GLUCOSE UR STRIP.AUTO-MCNC: NEGATIVE MG/DL
HCT VFR BLD AUTO: 34 % (ref 35–47)
HGB BLD-MCNC: 11.6 G/DL (ref 11.5–16)
HGB UR QL STRIP: ABNORMAL
IMM GRANULOCYTES # BLD AUTO: 0 K/UL (ref 0–0.04)
IMM GRANULOCYTES NFR BLD AUTO: 0 % (ref 0–0.5)
KETONES UR QL STRIP.AUTO: NEGATIVE MG/DL
LEUKOCYTE ESTERASE UR QL STRIP.AUTO: ABNORMAL
LYMPHOCYTES # BLD: 1.9 K/UL (ref 0.8–3.5)
LYMPHOCYTES NFR BLD: 44 % (ref 12–49)
MCH RBC QN AUTO: 32.8 PG (ref 26–34)
MCHC RBC AUTO-ENTMCNC: 34.1 G/DL (ref 30–36.5)
MCV RBC AUTO: 96 FL (ref 80–99)
MONOCYTES # BLD: 0.4 K/UL (ref 0–1)
MONOCYTES NFR BLD: 10 % (ref 5–13)
MUCOUS THREADS URNS QL MICRO: ABNORMAL /LPF
NEUTS SEG # BLD: 1.7 K/UL (ref 1.8–8)
NEUTS SEG NFR BLD: 40 % (ref 32–75)
NITRITE UR QL STRIP.AUTO: NEGATIVE
NRBC # BLD: 0 K/UL (ref 0–0.01)
NRBC BLD-RTO: 0 PER 100 WBC
PH UR STRIP: 6 (ref 5–8)
PLATELET # BLD AUTO: 327 K/UL (ref 150–400)
PMV BLD AUTO: 9.5 FL (ref 8.9–12.9)
POTASSIUM SERPL-SCNC: 4.6 MMOL/L (ref 3.5–5.1)
PROT SERPL-MCNC: 7.1 G/DL (ref 6.4–8.3)
PROT UR STRIP-MCNC: NEGATIVE MG/DL
RBC # BLD AUTO: 3.54 M/UL (ref 3.8–5.2)
RBC #/AREA URNS HPF: ABNORMAL /HPF
SODIUM SERPL-SCNC: 140 MMOL/L (ref 136–145)
SP GR UR REFRACTOMETRY: 1.02 (ref 1–1.03)
SPECIMEN HOLD: NORMAL
UROBILINOGEN UR QL STRIP.AUTO: 0.2 EU/DL (ref 0.2–1)
WBC # BLD AUTO: 4.2 K/UL (ref 3.6–11)
WBC URNS QL MICRO: ABNORMAL /HPF (ref 0–4)

## 2024-02-07 PROCEDURE — 96374 THER/PROPH/DIAG INJ IV PUSH: CPT

## 2024-02-07 PROCEDURE — 74176 CT ABD & PELVIS W/O CONTRAST: CPT

## 2024-02-07 PROCEDURE — 85025 COMPLETE CBC W/AUTO DIFF WBC: CPT

## 2024-02-07 PROCEDURE — 6360000002 HC RX W HCPCS: Performed by: EMERGENCY MEDICINE

## 2024-02-07 PROCEDURE — 96375 TX/PRO/DX INJ NEW DRUG ADDON: CPT

## 2024-02-07 PROCEDURE — 81001 URINALYSIS AUTO W/SCOPE: CPT

## 2024-02-07 PROCEDURE — 80053 COMPREHEN METABOLIC PANEL: CPT

## 2024-02-07 PROCEDURE — 99284 EMERGENCY DEPT VISIT MOD MDM: CPT

## 2024-02-07 PROCEDURE — 36415 COLL VENOUS BLD VENIPUNCTURE: CPT

## 2024-02-07 RX ORDER — ONDANSETRON 4 MG/1
4 TABLET, ORALLY DISINTEGRATING ORAL 3 TIMES DAILY PRN
Qty: 21 TABLET | Refills: 0 | Status: SHIPPED | OUTPATIENT
Start: 2024-02-07

## 2024-02-07 RX ORDER — KETOROLAC TROMETHAMINE 10 MG/1
10 TABLET, FILM COATED ORAL EVERY 6 HOURS PRN
Qty: 10 TABLET | Refills: 0 | Status: SHIPPED | OUTPATIENT
Start: 2024-02-07 | End: 2024-02-09 | Stop reason: SDUPTHER

## 2024-02-07 RX ORDER — TAMSULOSIN HYDROCHLORIDE 0.4 MG/1
0.4 CAPSULE ORAL DAILY
Qty: 14 CAPSULE | Refills: 3 | Status: SHIPPED | OUTPATIENT
Start: 2024-02-07

## 2024-02-07 RX ORDER — KETOROLAC TROMETHAMINE 30 MG/ML
30 INJECTION, SOLUTION INTRAMUSCULAR; INTRAVENOUS
Status: COMPLETED | OUTPATIENT
Start: 2024-02-07 | End: 2024-02-07

## 2024-02-07 RX ORDER — OXYCODONE HYDROCHLORIDE AND ACETAMINOPHEN 5; 325 MG/1; MG/1
1 TABLET ORAL EVERY 6 HOURS PRN
Qty: 12 TABLET | Refills: 0 | Status: SHIPPED | OUTPATIENT
Start: 2024-02-07 | End: 2024-02-10

## 2024-02-07 RX ADMIN — KETOROLAC TROMETHAMINE 30 MG: 30 INJECTION INTRAMUSCULAR; INTRAVENOUS at 06:04

## 2024-02-07 RX ADMIN — HYDROMORPHONE HYDROCHLORIDE 0.5 MG: 1 INJECTION, SOLUTION INTRAMUSCULAR; INTRAVENOUS; SUBCUTANEOUS at 06:49

## 2024-02-07 ASSESSMENT — PAIN SCALES - GENERAL
PAINLEVEL_OUTOF10: 8
PAINLEVEL_OUTOF10: 5
PAINLEVEL_OUTOF10: 8

## 2024-02-07 ASSESSMENT — PAIN DESCRIPTION - ORIENTATION: ORIENTATION: LEFT

## 2024-02-07 ASSESSMENT — PAIN DESCRIPTION - LOCATION: LOCATION: FLANK

## 2024-02-07 ASSESSMENT — PAIN - FUNCTIONAL ASSESSMENT: PAIN_FUNCTIONAL_ASSESSMENT: 0-10

## 2024-02-07 NOTE — ED NOTES
Pt and spouse given discharge instructions, pt education, 4 prescriptions and follow up information. Pt and spouse verbalize understanding. All questions answered. Pt discharged to home in private vehicle with spouse, ambulatory. Pt A&O x4, RA, pain controlled.

## 2024-02-07 NOTE — ED TRIAGE NOTES
Pt.presents for evaluation of left flank pain onset x \"a while\". Pt.has paperwork from [facility] 1/7 for same.      States she has finished antibiotics but has some pain meds left- pt.took hydrocodone this morning around 0430    Appears uncomfortable.  Denies any urinary sxs-states flank pain only currently 8/10

## 2024-02-07 NOTE — ED PROVIDER NOTES
[facility] EMERGENCY DEPT  EMERGENCY DEPARTMENT ENCOUNTER      Pt Name: Shweta Greene  MRN: [id number]  Birthdate 1975  Date of evaluation: 2/7/2024  Provider: Oseas Post DO    CHIEF COMPLAINT       Chief Complaint   Patient presents with    Flank Pain         HISTORY OF PRESENT ILLNESS   (Location/Symptom, Timing/Onset, Context/Setting, Quality, Duration, Modifying Factors, Severity)  Note limiting factors.   Well-appearing patient arrives for flank pain.  For \"some time now\" the patient had left flank pain.  She was recently treated for urinary symptoms but states that the pains come back and is worse.  Patient describes left flank pain down to her hip, sometimes is worse with movement.  No dysuria but she does have nausea with it.            Review of External Medical Records:     Nursing Notes were reviewed.    REVIEW OF SYSTEMS    (2-9 systems for level 4, 10 or more for level 5)     Review of Systems    Except as noted above the remainder of the review of systems was reviewed and negative.       PAST MEDICAL HISTORY     Past Medical History:   Diagnosis Date    Arrhythmia     PAC    Arthritis     Cellulitis     cellulitis    Chest pain 2015    cardiac cath, cleared    Diabetes (HCC)     A1C in the 5's.  no longer needs meds, lost 150 lbs.    GERD (gastroesophageal reflux disease)     H/O seasonal allergies     Headache     Hearing loss 7/10/2020    Ill-defined condition     \"twisted\" kidney right    Lyme disease     Obesity     Plantar fasciitis     PUD (peptic ulcer disease)     Trigger finger, right index finger 2/2016    Vitamin D deficiency          SURGICAL HISTORY       Past Surgical History:   Procedure Laterality Date    CARDIAC CATHETERIZATION  7/28/15    COLONOSCOPY N/A 11/9/2023    COLONOSCOPY performed by Jeffy White MD at [facility] ENDOSCOPY    COLSC FLX W/NDSC US XM RCTM ET AL LMTD&ADJ STRUX  2011    GASTRIC BYPASS SURGERY  2015    GI      ORTHOPEDIC SURGERY  7-30-15    Rt. rotator

## 2024-02-09 ENCOUNTER — TELEMEDICINE (OUTPATIENT)
Facility: CLINIC | Age: 49
End: 2024-02-09
Payer: COMMERCIAL

## 2024-02-09 DIAGNOSIS — N20.1 URETEROLITHIASIS: Primary | ICD-10-CM

## 2024-02-09 PROCEDURE — 99214 OFFICE O/P EST MOD 30 MIN: CPT | Performed by: FAMILY MEDICINE

## 2024-02-09 RX ORDER — KETOROLAC TROMETHAMINE 10 MG/1
10 TABLET, FILM COATED ORAL EVERY 6 HOURS PRN
Qty: 10 TABLET | Refills: 0 | Status: SHIPPED | OUTPATIENT
Start: 2024-02-09

## 2024-02-09 RX ORDER — CIPROFLOXACIN 500 MG/1
500 TABLET, FILM COATED ORAL 2 TIMES DAILY
Qty: 14 TABLET | Refills: 0 | Status: SHIPPED | OUTPATIENT
Start: 2024-02-09 | End: 2024-02-16

## 2024-02-09 ASSESSMENT — ENCOUNTER SYMPTOMS
RESPIRATORY NEGATIVE: 1
NAUSEA: 1
ABDOMINAL PAIN: 1

## 2024-02-09 NOTE — ASSESSMENT & PLAN NOTE
Persistent symptoms despite treatment. Sending cipro today and will provide refill of toradol while awaiting urology follow up. Provided with stone line with [facility] and she agrees to call today. Will enter referral. Provided ER instructions should symptoms persist or worsen.

## 2024-02-09 NOTE — PATIENT INSTRUCTIONS
[facility] Kidney Stone Hotline:  560-STONE    The Kidney Stone Hotline is a resource to assist you when experiencing the symptoms of a kidney stone for those living in the [place] area. Call our hotline at 804-560-ston(e) [phone]. When you call this number, a staff member will coordinate appropriate care by either scheduling you a timely appointment at our office or directing you to immediate care, as needed.    Should you need to be treated at an Emergency Department, our preference is [facility] located at [address] [place] [address].  [facility] is very knowledgeable on [facility]’s protocol for kidney stone evaluation allowing us to treat you in the most timely and efficient manner.

## 2024-02-09 NOTE — PROGRESS NOTES
Shweta Greene, was evaluated through a synchronous (real-time) audio-video encounter. The patient (or guardian if applicable) is aware that this is a billable service, which includes applicable co-pays. This Virtual Visit was conducted with patient's (and/or legal guardian's) consent. Patient identification was verified, and a caregiver was present when appropriate.   The patient was located at Home: [address] [address]  Provider was located at Facility (Appt Dept): [address] [facility]  [address]  [place] [address]      Shweta Greene (:  1975) is a Established patient, presenting virtually for evaluation of the following:    Assessment & Plan   Below is the assessment and plan developed based on review of pertinent history, physical exam, labs, studies, and medications.  1. Ureterolithiasis  Assessment & Plan:    Persistent symptoms despite treatment. Sending cipro today and will provide refill of toradol while awaiting urology follow up. Provided with stone line with [facility] and she agrees to call today. Will enter referral. Provided ER instructions should symptoms persist or worsen.   Orders:  -     ketorolac (TORADOL) 10 MG tablet; Take 1 tablet by mouth every 6 hours as needed for Pain, Disp-10 tablet, R-0Normal  -     ciprofloxacin (CIPRO) 500 MG tablet; Take 1 tablet by mouth 2 times daily for 7 days, Disp-14 tablet, R-0Normal  -     [facility] - Darin Sanchez MD, [facility] ([address])    Return in about 3 months (around 2024), or if symptoms worsen or fail to improve, for Physical.       Subjective   HPI  Shweta Greene is a 48 y.o. female who presents for hospital follow up.     She was seen in the ER Wednesday for L flank pain, also seen previously at [facility] ED  for this. She finished antibiotics without relief per ED report. CT of abdomen and pelvis wo contrast performed showing bilateral nephrolithiasis with mild R hydronephrosis due

## 2024-03-29 NOTE — TELEPHONE ENCOUNTER
PCP: No primary care provider on file.    Last appt: [unfilled]  No future appointments.    Requested Prescriptions     Pending Prescriptions Disp Refills    cyclobenzaprine (FLEXERIL) 10 MG tablet [Pharmacy Med Name: CYCLOBENZAPRINE HCL 10MG TAB] 20 tablet 0     Sig: TAKE 1 TABLET BY MOUTH 2 TIMES DAILY AS NEEDED FOR MUSCLE SPASMS       Prior labs and Blood pressures:  BP Readings from Last 3 Encounters:   02/07/24 112/84   11/20/23 114/73   11/09/23 96/61     Lab Results   Component Value Date/Time     02/07/2024 06:08 AM    K 4.6 02/07/2024 06:08 AM     02/07/2024 06:08 AM    CO2 28 02/07/2024 06:08 AM    BUN 6 02/07/2024 06:08 AM    GFRAA >60 04/02/2021 11:13 AM     No results found for: \"HBA1C\", \"BGS2YIMY\"  Lab Results   Component Value Date/Time    CHOL 228 09/19/2023 02:53 PM     09/19/2023 02:53 PM     No results found for: \"VITD3\", \"VD3RIA\"    Lab Results   Component Value Date/Time    TSH 1.650 10/18/2022 08:37 AM        No cyanosis, no pallor, no jaundice, no rash

## 2024-04-01 RX ORDER — CYCLOBENZAPRINE HCL 10 MG
10 TABLET ORAL 2 TIMES DAILY PRN
Qty: 20 TABLET | Refills: 0 | OUTPATIENT
Start: 2024-04-01

## 2024-04-09 ENCOUNTER — TELEMEDICINE (OUTPATIENT)
Facility: CLINIC | Age: 49
End: 2024-04-09
Payer: COMMERCIAL

## 2024-04-09 DIAGNOSIS — F41.9 ANXIETY: ICD-10-CM

## 2024-04-09 DIAGNOSIS — G89.29 CHRONIC LEFT-SIDED LOW BACK PAIN WITH LEFT-SIDED SCIATICA: Primary | ICD-10-CM

## 2024-04-09 DIAGNOSIS — M54.42 CHRONIC LEFT-SIDED LOW BACK PAIN WITH LEFT-SIDED SCIATICA: Primary | ICD-10-CM

## 2024-04-09 PROCEDURE — 99213 OFFICE O/P EST LOW 20 MIN: CPT | Performed by: NURSE PRACTITIONER

## 2024-04-09 NOTE — PROGRESS NOTES
dentified pt with two pt identifiers(name and ).    Chief Complaint   Patient presents with    Medication Refill     Anxiety medication refill  Also, saw Urologist today  Has 2 kidney stones  Requesting something for pain        Health Maintenance Due   Topic    Hepatitis B vaccine (1 of 3 - 3-dose series)    COVID-19 Vaccine (1)    Pneumococcal 0-64 years Vaccine (1 of 2 - PCV)    Diabetic retinal exam     DTaP/Tdap/Td vaccine (1 - Tdap)    Diabetic Alb to Cr ratio (uACR) test     Diabetic foot exam     Shingles vaccine (1 of 2)       Wt Readings from Last 3 Encounters:   24 59 kg (130 lb)   23 59.4 kg (131 lb)   23 59.6 kg (131 lb 6.3 oz)     Temp Readings from Last 3 Encounters:   24 97.4 °F (36.3 °C) (Oral)   23 97.8 °F (36.6 °C) (Oral)   23 97.2 °F (36.2 °C) (Skin)     BP Readings from Last 3 Encounters:   24 112/84   23 114/73   23 96/61     Pulse Readings from Last 3 Encounters:   24 70   23 67   23 84           Coordination of Care Questionnaire:  :   1. \"Have you been to the ER, urgent care clinic since your last visit?  Hospitalized since your last visit?\" no    2. \"Have you seen or consulted any other health care providers outside of the [facility] since your last visit?\" no     3. For patients aged 45-75: Has the patient had a colonoscopy / FIT/ Cologuard? N o      If the patient is female:    4. For patients aged 40-74: Has the patient had a mammogram within the past 2 years? no      5. For patients aged 21-65: Has the patient had a pap smear? no     3) Do you have an Advance Directive on file? no  Are you interested in receiving information about Advance Directives? no    Patient is accompanied by self I have received verbal consent from Shweta Greene to discuss any/all medical information while they are present in the room.

## 2024-04-10 ASSESSMENT — ENCOUNTER SYMPTOMS
BACK PAIN: 1
GASTROINTESTINAL NEGATIVE: 1
RESPIRATORY NEGATIVE: 1

## 2024-04-10 NOTE — PROGRESS NOTES
Shweta Greene, was evaluated through a synchronous (real-time) audio-video encounter. The patient (or guardian if applicable) is aware that this is a billable service, which includes applicable co-pays. This Virtual Visit was conducted with patient's (and/or legal guardian's) consent. Patient identification was verified, and a caregiver was present when appropriate.   The patient was located at Home: [address] [place] [id number]  Provider was located at Home (Appt Dept State): [place]  Confirm you are appropriately licensed, registered, or certified to deliver care in the state where the patient is located as indicated above. If you are not or unsure, please re-schedule the visit: Yes, I confirm.     Shweta Greene (:  1975) is a Established patient, presenting virtually for evaluation of the following:    Assessment & Plan   Below is the assessment and plan developed based on review of pertinent history, physical exam, labs, studies, and medications.  1. Chronic left-sided low back pain with left-sided sciatica  -     [facility] - Avni Marcos MD, Orthopedic Surgery (back, neck, spine), [place]  Patient was seen by virtual video.  Patient complaining of left-sided low back pain that radiates down into her leg.  This has been chronic.  She also has been recently seen by urology and was diagnosed with kidney stones on the same side.  She is requesting something for pain management.  I recommended Tylenol or Motrin or she could be seen by her urologist again for any controlled substances.  I recommended that she drink more water to hydrate if she has any problems with urination or worsening pain she needs to go to emergency room.  2. Anxiety  -     sertraline (ZOLOFT) 50 MG tablet; Take 1 tablet by mouth daily, Disp-90 tablet, R-1Normal  Patient also needed medication refill for sertraline.  Has taken in the past with no ill effect.  Denies any thoughts of hurting herself or others.  Medication sent to

## 2024-04-15 ENCOUNTER — APPOINTMENT (OUTPATIENT)
Facility: HOSPITAL | Age: 49
End: 2024-04-15
Payer: COMMERCIAL

## 2024-04-15 ENCOUNTER — TELEPHONE (OUTPATIENT)
Facility: CLINIC | Age: 49
End: 2024-04-15

## 2024-04-15 ENCOUNTER — HOSPITAL ENCOUNTER (EMERGENCY)
Facility: HOSPITAL | Age: 49
Discharge: HOME | End: 2024-04-15
Attending: EMERGENCY MEDICINE
Payer: COMMERCIAL

## 2024-04-15 VITALS
RESPIRATION RATE: 21 BRPM | OXYGEN SATURATION: 98 % | HEIGHT: 64 IN | TEMPERATURE: 98 F | DIASTOLIC BLOOD PRESSURE: 62 MMHG | SYSTOLIC BLOOD PRESSURE: 110 MMHG | HEART RATE: 95 BPM | BODY MASS INDEX: 22.36 KG/M2 | WEIGHT: 131 LBS

## 2024-04-15 DIAGNOSIS — K57.32 DIVERTICULITIS OF COLON: Primary | ICD-10-CM

## 2024-04-15 LAB
ALBUMIN SERPL-MCNC: 3.9 G/DL (ref 3.5–5)
ALBUMIN/GLOB SERPL: 0.9 (ref 1.1–2.2)
ALP SERPL-CCNC: 77 U/L (ref 45–117)
ALT SERPL-CCNC: 21 U/L (ref 12–78)
ANION GAP SERPL CALC-SCNC: 4 MMOL/L (ref 5–15)
APPEARANCE UR: CLEAR
AST SERPL-CCNC: 28 U/L (ref 15–37)
BACTERIA URNS QL MICRO: NEGATIVE /HPF
BASOPHILS # BLD: 0.1 K/UL (ref 0–0.1)
BASOPHILS NFR BLD: 1 % (ref 0–1)
BILIRUB SERPL-MCNC: 0.6 MG/DL (ref 0.2–1)
BILIRUB UR QL: NEGATIVE
BUN SERPL-MCNC: 10 MG/DL (ref 6–20)
BUN/CREAT SERPL: 11 (ref 12–20)
CALCIUM SERPL-MCNC: 9.5 MG/DL (ref 8.5–10.1)
CHLORIDE SERPL-SCNC: 105 MMOL/L (ref 97–108)
CO2 SERPL-SCNC: 25 MMOL/L (ref 21–32)
COLOR UR: ABNORMAL
COMMENT:: NORMAL
CREAT SERPL-MCNC: 0.88 MG/DL (ref 0.55–1.02)
DIFFERENTIAL METHOD BLD: ABNORMAL
EOSINOPHIL # BLD: 0.2 K/UL (ref 0–0.4)
EOSINOPHIL NFR BLD: 3 % (ref 0–7)
EPITH CASTS URNS QL MICRO: ABNORMAL /LPF
ERYTHROCYTE [DISTWIDTH] IN BLOOD BY AUTOMATED COUNT: 13.4 % (ref 11.5–14.5)
GLOBULIN SER CALC-MCNC: 4.4 G/DL (ref 2–4)
GLUCOSE SERPL-MCNC: 81 MG/DL (ref 65–100)
GLUCOSE UR STRIP.AUTO-MCNC: NEGATIVE MG/DL
HCG UR QL: NEGATIVE
HCT VFR BLD AUTO: 48.1 % (ref 35–47)
HGB BLD-MCNC: 16.6 G/DL (ref 11.5–16)
HGB UR QL STRIP: ABNORMAL
HYALINE CASTS URNS QL MICRO: ABNORMAL /LPF (ref 0–2)
IMM GRANULOCYTES # BLD AUTO: 0 K/UL (ref 0–0.04)
IMM GRANULOCYTES NFR BLD AUTO: 0 % (ref 0–0.5)
KETONES UR QL STRIP.AUTO: NEGATIVE MG/DL
LEUKOCYTE ESTERASE UR QL STRIP.AUTO: ABNORMAL
LYMPHOCYTES # BLD: 1.3 K/UL (ref 0.8–3.5)
LYMPHOCYTES NFR BLD: 21 % (ref 12–49)
MCH RBC QN AUTO: 32.9 PG (ref 26–34)
MCHC RBC AUTO-ENTMCNC: 34.5 G/DL (ref 30–36.5)
MCV RBC AUTO: 95.2 FL (ref 80–99)
MONOCYTES # BLD: 0.5 K/UL (ref 0–1)
MONOCYTES NFR BLD: 9 % (ref 5–13)
NEUTS SEG # BLD: 4.1 K/UL (ref 1.8–8)
NEUTS SEG NFR BLD: 66 % (ref 32–75)
NITRITE UR QL STRIP.AUTO: NEGATIVE
NRBC # BLD: 0 K/UL (ref 0–0.01)
NRBC BLD-RTO: 0 PER 100 WBC
PH UR STRIP: 5.5 (ref 5–8)
PLATELET # BLD AUTO: 242 K/UL (ref 150–400)
PMV BLD AUTO: 9.8 FL (ref 8.9–12.9)
POTASSIUM SERPL-SCNC: 4.5 MMOL/L (ref 3.5–5.1)
PROT SERPL-MCNC: 8.3 G/DL (ref 6.4–8.2)
PROT UR STRIP-MCNC: NEGATIVE MG/DL
RBC # BLD AUTO: 5.05 M/UL (ref 3.8–5.2)
RBC #/AREA URNS HPF: ABNORMAL /HPF (ref 0–5)
SODIUM SERPL-SCNC: 134 MMOL/L (ref 136–145)
SP GR UR REFRACTOMETRY: 1.01 (ref 1–1.03)
SPECIMEN HOLD: NORMAL
UROBILINOGEN UR QL STRIP.AUTO: 0.2 EU/DL (ref 0.2–1)
WBC # BLD AUTO: 6.1 K/UL (ref 3.6–11)
WBC URNS QL MICRO: ABNORMAL /HPF (ref 0–4)

## 2024-04-15 PROCEDURE — 81001 URINALYSIS AUTO W/SCOPE: CPT

## 2024-04-15 PROCEDURE — 96374 THER/PROPH/DIAG INJ IV PUSH: CPT

## 2024-04-15 PROCEDURE — 74176 CT ABD & PELVIS W/O CONTRAST: CPT

## 2024-04-15 PROCEDURE — 80053 COMPREHEN METABOLIC PANEL: CPT

## 2024-04-15 PROCEDURE — 96375 TX/PRO/DX INJ NEW DRUG ADDON: CPT

## 2024-04-15 PROCEDURE — 85025 COMPLETE CBC W/AUTO DIFF WBC: CPT

## 2024-04-15 PROCEDURE — 99284 EMERGENCY DEPT VISIT MOD MDM: CPT

## 2024-04-15 PROCEDURE — 6360000002 HC RX W HCPCS: Performed by: EMERGENCY MEDICINE

## 2024-04-15 PROCEDURE — 81025 URINE PREGNANCY TEST: CPT

## 2024-04-15 PROCEDURE — 36415 COLL VENOUS BLD VENIPUNCTURE: CPT

## 2024-04-15 RX ORDER — ONDANSETRON 2 MG/ML
4 INJECTION INTRAMUSCULAR; INTRAVENOUS ONCE
Status: DISCONTINUED | OUTPATIENT
Start: 2024-04-15 | End: 2024-04-15

## 2024-04-15 RX ORDER — FENTANYL CITRATE 50 UG/ML
25 INJECTION, SOLUTION INTRAMUSCULAR; INTRAVENOUS
Status: COMPLETED | OUTPATIENT
Start: 2024-04-15 | End: 2024-04-15

## 2024-04-15 RX ORDER — KETOROLAC TROMETHAMINE 30 MG/ML
30 INJECTION, SOLUTION INTRAMUSCULAR; INTRAVENOUS
Status: COMPLETED | OUTPATIENT
Start: 2024-04-15 | End: 2024-04-15

## 2024-04-15 RX ORDER — HYDROCODONE BITARTRATE AND ACETAMINOPHEN 5; 325 MG/1; MG/1
1 TABLET ORAL EVERY 4 HOURS PRN
Qty: 6 TABLET | Refills: 0 | Status: SHIPPED | OUTPATIENT
Start: 2024-04-15 | End: 2024-04-17

## 2024-04-15 RX ORDER — AMOXICILLIN AND CLAVULANATE POTASSIUM 875; 125 MG/1; MG/1
1 TABLET, FILM COATED ORAL 2 TIMES DAILY
Qty: 14 TABLET | Refills: 0 | Status: SHIPPED | OUTPATIENT
Start: 2024-04-15 | End: 2024-04-22

## 2024-04-15 RX ADMIN — FENTANYL CITRATE 25 MCG: 50 INJECTION INTRAMUSCULAR; INTRAVENOUS at 08:45

## 2024-04-15 RX ADMIN — KETOROLAC TROMETHAMINE 30 MG: 30 INJECTION, SOLUTION INTRAMUSCULAR at 07:38

## 2024-04-15 ASSESSMENT — PAIN SCALES - GENERAL
PAINLEVEL_OUTOF10: 8
PAINLEVEL_OUTOF10: 8
PAINLEVEL_OUTOF10: 4
PAINLEVEL_OUTOF10: 6

## 2024-04-15 ASSESSMENT — PAIN DESCRIPTION - LOCATION
LOCATION: FLANK

## 2024-04-15 ASSESSMENT — PAIN DESCRIPTION - ORIENTATION: ORIENTATION: LEFT;RIGHT

## 2024-04-15 ASSESSMENT — PAIN - FUNCTIONAL ASSESSMENT: PAIN_FUNCTIONAL_ASSESSMENT: 0-10

## 2024-04-15 NOTE — ED NOTES
Bedside and Verbal shift change report given to Edgardo WOLF RN (oncoming nurse) by Monika MUÑOZ (offgoing nurse). Report included the following information Nurse Handoff Report, ED Encounter Summary, ED SBAR, MAR, and Recent Results.

## 2024-04-15 NOTE — ED NOTES
Dr. Post reviewed discharge instructions with the patient.  The patient verbalized understanding.  All questions and concerns were addressed.  The patient declined a wheelchair and is discharged ambulatory in the care of family members with instructions and prescriptions in hand.  Pt is alert and oriented x 4.  Respirations are clear and unlabored.

## 2024-04-15 NOTE — TELEPHONE ENCOUNTER
I see that she just saw Theodore Davis NP last week at [facility]. I'm wondering if we need to cancel her appt so that she can stay with him? He started her on some new medications. Thanks.

## 2024-04-15 NOTE — ED PROVIDER NOTES
[facility] EMERGENCY DEPT  EMERGENCY DEPARTMENT ENCOUNTER      Pt Name: Shweta Greene  MRN: [id number]  Birthdate 1975  Date of evaluation: 4/15/2024  Provider: Oseas Post DO    CHIEF COMPLAINT       Chief Complaint   Patient presents with    Flank Pain         HISTORY OF PRESENT ILLNESS   (Location/Symptom, Timing/Onset, Context/Setting, Quality, Duration, Modifying Factors, Severity)  Note limiting factors.   49-year-old female history of kidney stones comes in with flank pain.  Patient states around 7 PM she had acute onset left flank pain.  Is been waxing and waning throughout the night.  She has severe discomfort now with nausea.  Zofran helps with nausea.  She states this pain is similar to the pain she had previously with her kidney stones.  She has some frequency but no dysuria.  She denies fevers or chills.  She has been working with urology over the last several months since she was last seen for a 4 mm kidney stone.  She denies other complaints            Review of External Medical Records:     Nursing Notes were reviewed.    REVIEW OF SYSTEMS    (2-9 systems for level 4, 10 or more for level 5)     Review of Systems    Except as noted above the remainder of the review of systems was reviewed and negative.       PAST MEDICAL HISTORY     Past Medical History:   Diagnosis Date    Arrhythmia     PAC    Arthritis     Cellulitis     cellulitis    Chest pain 2015    cardiac cath, cleared    Diabetes (HCC)     A1C in the 5's.  no longer needs meds, lost 150 lbs.    GERD (gastroesophageal reflux disease)     H/O seasonal allergies     Headache     Hearing loss 7/10/2020    Ill-defined condition     \"twisted\" kidney right    Lyme disease     Obesity     Plantar fasciitis     PUD (peptic ulcer disease)     Trigger finger, right index finger 2/2016    Vitamin D deficiency          SURGICAL HISTORY       Past Surgical History:   Procedure Laterality Date    CARDIAC CATHETERIZATION  7/28/15    COLONOSCOPY N/A

## 2024-04-15 NOTE — ED TRIAGE NOTES
Pt ambulatory to triage w CC of bilateral flank pain.     Pt reports she was seen in February in a different ER, who told her she has two kidney stones.     Pt sees a urologist at [facility].     Pt has been taking flomax, and reports she still has not passed the stones.     Pt endorses nausea but no vomiting.

## 2024-04-17 ENCOUNTER — APPOINTMENT (OUTPATIENT)
Facility: HOSPITAL | Age: 49
End: 2024-04-17
Payer: COMMERCIAL

## 2024-04-17 ENCOUNTER — HOSPITAL ENCOUNTER (EMERGENCY)
Facility: HOSPITAL | Age: 49
Discharge: HOME | End: 2024-04-17
Attending: EMERGENCY MEDICINE
Payer: COMMERCIAL

## 2024-04-17 VITALS
BODY MASS INDEX: 22.28 KG/M2 | RESPIRATION RATE: 19 BRPM | SYSTOLIC BLOOD PRESSURE: 118 MMHG | WEIGHT: 130.51 LBS | HEIGHT: 64 IN | DIASTOLIC BLOOD PRESSURE: 76 MMHG | TEMPERATURE: 97.5 F | HEART RATE: 68 BPM | OXYGEN SATURATION: 100 %

## 2024-04-17 DIAGNOSIS — M51.36 DDD (DEGENERATIVE DISC DISEASE), LUMBAR: ICD-10-CM

## 2024-04-17 DIAGNOSIS — M54.16 LUMBAR RADICULOPATHY: Primary | ICD-10-CM

## 2024-04-17 DIAGNOSIS — K57.32 DIVERTICULITIS OF COLON: ICD-10-CM

## 2024-04-17 DIAGNOSIS — M47.816 FACET ARTHROPATHY, LUMBAR: ICD-10-CM

## 2024-04-17 LAB
ALBUMIN SERPL-MCNC: 3.7 G/DL (ref 3.5–5)
ALBUMIN/GLOB SERPL: 0.9 (ref 1.1–2.2)
ALP SERPL-CCNC: 79 U/L (ref 45–117)
ALT SERPL-CCNC: 19 U/L (ref 12–78)
ANION GAP SERPL CALC-SCNC: 5 MMOL/L (ref 5–15)
APPEARANCE UR: CLEAR
AST SERPL-CCNC: 22 U/L (ref 15–37)
BACTERIA URNS QL MICRO: NEGATIVE /HPF
BASOPHILS # BLD: 0.1 K/UL (ref 0–0.1)
BASOPHILS NFR BLD: 2 % (ref 0–1)
BILIRUB SERPL-MCNC: 0.5 MG/DL (ref 0.2–1)
BILIRUB UR QL: NEGATIVE
BUN SERPL-MCNC: 9 MG/DL (ref 6–20)
BUN/CREAT SERPL: 13 (ref 12–20)
CALCIUM SERPL-MCNC: 9.6 MG/DL (ref 8.5–10.1)
CHLORIDE SERPL-SCNC: 105 MMOL/L (ref 97–108)
CO2 SERPL-SCNC: 25 MMOL/L (ref 21–32)
COLOR UR: ABNORMAL
COMMENT:: NORMAL
CREAT SERPL-MCNC: 0.72 MG/DL (ref 0.55–1.02)
DIFFERENTIAL METHOD BLD: ABNORMAL
EOSINOPHIL # BLD: 0.2 K/UL (ref 0–0.4)
EOSINOPHIL NFR BLD: 5 % (ref 0–7)
EPITH CASTS URNS QL MICRO: ABNORMAL /LPF
ERYTHROCYTE [DISTWIDTH] IN BLOOD BY AUTOMATED COUNT: 13.4 % (ref 11.5–14.5)
GLOBULIN SER CALC-MCNC: 4.3 G/DL (ref 2–4)
GLUCOSE SERPL-MCNC: 92 MG/DL (ref 65–100)
GLUCOSE UR STRIP.AUTO-MCNC: NEGATIVE MG/DL
HCT VFR BLD AUTO: 48.4 % (ref 35–47)
HGB BLD-MCNC: 16.7 G/DL (ref 11.5–16)
HGB UR QL STRIP: ABNORMAL
HYALINE CASTS URNS QL MICRO: ABNORMAL /LPF (ref 0–5)
IMM GRANULOCYTES # BLD AUTO: 0 K/UL (ref 0–0.04)
IMM GRANULOCYTES NFR BLD AUTO: 0 % (ref 0–0.5)
KETONES UR QL STRIP.AUTO: NEGATIVE MG/DL
LEUKOCYTE ESTERASE UR QL STRIP.AUTO: ABNORMAL
LYMPHOCYTES # BLD: 1.1 K/UL (ref 0.8–3.5)
LYMPHOCYTES NFR BLD: 29 % (ref 12–49)
MCH RBC QN AUTO: 33.1 PG (ref 26–34)
MCHC RBC AUTO-ENTMCNC: 34.5 G/DL (ref 30–36.5)
MCV RBC AUTO: 95.8 FL (ref 80–99)
MONOCYTES # BLD: 0.3 K/UL (ref 0–1)
MONOCYTES NFR BLD: 8 % (ref 5–13)
NEUTS SEG # BLD: 2.1 K/UL (ref 1.8–8)
NEUTS SEG NFR BLD: 56 % (ref 32–75)
NITRITE UR QL STRIP.AUTO: NEGATIVE
NRBC # BLD: 0 K/UL (ref 0–0.01)
NRBC BLD-RTO: 0 PER 100 WBC
PH UR STRIP: 5.5 (ref 5–8)
PLATELET # BLD AUTO: 225 K/UL (ref 150–400)
PMV BLD AUTO: 9.9 FL (ref 8.9–12.9)
POTASSIUM SERPL-SCNC: 4 MMOL/L (ref 3.5–5.1)
PROT SERPL-MCNC: 8 G/DL (ref 6.4–8.2)
PROT UR STRIP-MCNC: NEGATIVE MG/DL
RBC # BLD AUTO: 5.05 M/UL (ref 3.8–5.2)
RBC #/AREA URNS HPF: ABNORMAL /HPF (ref 0–5)
SODIUM SERPL-SCNC: 135 MMOL/L (ref 136–145)
SP GR UR REFRACTOMETRY: 1.01 (ref 1–1.03)
SPECIMEN HOLD: NORMAL
URINE CULTURE IF INDICATED: ABNORMAL
UROBILINOGEN UR QL STRIP.AUTO: 0.2 EU/DL (ref 0.2–1)
WBC # BLD AUTO: 3.7 K/UL (ref 3.6–11)
WBC URNS QL MICRO: ABNORMAL /HPF (ref 0–4)

## 2024-04-17 PROCEDURE — 72110 X-RAY EXAM L-2 SPINE 4/>VWS: CPT

## 2024-04-17 PROCEDURE — 96374 THER/PROPH/DIAG INJ IV PUSH: CPT

## 2024-04-17 PROCEDURE — 99284 EMERGENCY DEPT VISIT MOD MDM: CPT

## 2024-04-17 PROCEDURE — 81001 URINALYSIS AUTO W/SCOPE: CPT

## 2024-04-17 PROCEDURE — 85025 COMPLETE CBC W/AUTO DIFF WBC: CPT

## 2024-04-17 PROCEDURE — 74018 RADEX ABDOMEN 1 VIEW: CPT

## 2024-04-17 PROCEDURE — 6370000000 HC RX 637 (ALT 250 FOR IP): Performed by: EMERGENCY MEDICINE

## 2024-04-17 PROCEDURE — 96375 TX/PRO/DX INJ NEW DRUG ADDON: CPT

## 2024-04-17 PROCEDURE — 80053 COMPREHEN METABOLIC PANEL: CPT

## 2024-04-17 PROCEDURE — 6360000002 HC RX W HCPCS

## 2024-04-17 PROCEDURE — 6360000002 HC RX W HCPCS: Performed by: EMERGENCY MEDICINE

## 2024-04-17 PROCEDURE — 76770 US EXAM ABDO BACK WALL COMP: CPT

## 2024-04-17 PROCEDURE — 36415 COLL VENOUS BLD VENIPUNCTURE: CPT

## 2024-04-17 RX ORDER — CYCLOBENZAPRINE HCL 10 MG
10 TABLET ORAL 3 TIMES DAILY PRN
Qty: 21 TABLET | Refills: 0 | Status: SHIPPED | OUTPATIENT
Start: 2024-04-17 | End: 2024-04-27

## 2024-04-17 RX ORDER — HYDROCODONE BITARTRATE AND ACETAMINOPHEN 5; 325 MG/1; MG/1
1 TABLET ORAL EVERY 6 HOURS PRN
Qty: 8 TABLET | Refills: 0 | Status: SHIPPED | OUTPATIENT
Start: 2024-04-17 | End: 2024-04-20

## 2024-04-17 RX ORDER — KETOROLAC TROMETHAMINE 15 MG/ML
INJECTION, SOLUTION INTRAMUSCULAR; INTRAVENOUS
Status: COMPLETED
Start: 2024-04-17 | End: 2024-04-17

## 2024-04-17 RX ORDER — CYCLOBENZAPRINE HCL 10 MG
10 TABLET ORAL
Status: COMPLETED | OUTPATIENT
Start: 2024-04-17 | End: 2024-04-17

## 2024-04-17 RX ORDER — KETOROLAC TROMETHAMINE 15 MG/ML
15 INJECTION, SOLUTION INTRAMUSCULAR; INTRAVENOUS ONCE
Status: COMPLETED | OUTPATIENT
Start: 2024-04-17 | End: 2024-04-17

## 2024-04-17 RX ORDER — HYDROCODONE BITARTRATE AND ACETAMINOPHEN 5; 325 MG/1; MG/1
2 TABLET ORAL
Status: COMPLETED | OUTPATIENT
Start: 2024-04-17 | End: 2024-04-17

## 2024-04-17 RX ORDER — CELECOXIB 100 MG/1
100 CAPSULE ORAL EVERY 12 HOURS PRN
Qty: 14 CAPSULE | Refills: 0 | Status: SHIPPED | OUTPATIENT
Start: 2024-04-17 | End: 2024-04-24

## 2024-04-17 RX ORDER — LIDOCAINE 50 MG/G
1 PATCH TOPICAL DAILY
Qty: 10 PATCH | Refills: 0 | Status: SHIPPED | OUTPATIENT
Start: 2024-04-17 | End: 2024-04-27

## 2024-04-17 RX ORDER — ONDANSETRON 4 MG/1
4 TABLET, ORALLY DISINTEGRATING ORAL EVERY 6 HOURS PRN
Qty: 20 TABLET | Refills: 0 | Status: SHIPPED | OUTPATIENT
Start: 2024-04-17

## 2024-04-17 RX ORDER — ONDANSETRON 2 MG/ML
4 INJECTION INTRAMUSCULAR; INTRAVENOUS ONCE
Status: COMPLETED | OUTPATIENT
Start: 2024-04-17 | End: 2024-04-17

## 2024-04-17 RX ADMIN — HYDROMORPHONE HYDROCHLORIDE 0.5 MG: 1 INJECTION, SOLUTION INTRAMUSCULAR; INTRAVENOUS; SUBCUTANEOUS at 08:00

## 2024-04-17 RX ADMIN — KETOROLAC TROMETHAMINE 15 MG: 15 INJECTION, SOLUTION INTRAMUSCULAR; INTRAVENOUS at 07:49

## 2024-04-17 RX ADMIN — CYCLOBENZAPRINE 10 MG: 10 TABLET, FILM COATED ORAL at 07:48

## 2024-04-17 RX ADMIN — ONDANSETRON 4 MG: 2 INJECTION INTRAMUSCULAR; INTRAVENOUS at 08:00

## 2024-04-17 RX ADMIN — HYDROCODONE BITARTRATE AND ACETAMINOPHEN 2 TABLET: 5; 325 TABLET ORAL at 10:26

## 2024-04-17 ASSESSMENT — PAIN SCALES - GENERAL
PAINLEVEL_OUTOF10: 10
PAINLEVEL_OUTOF10: 9
PAINLEVEL_OUTOF10: 10
PAINLEVEL_OUTOF10: 8
PAINLEVEL_OUTOF10: 7

## 2024-04-17 ASSESSMENT — PAIN DESCRIPTION - ORIENTATION
ORIENTATION: LEFT

## 2024-04-17 ASSESSMENT — PAIN DESCRIPTION - LOCATION
LOCATION: FLANK
LOCATION: BACK
LOCATION: FLANK

## 2024-04-17 ASSESSMENT — LIFESTYLE VARIABLES
HOW OFTEN DO YOU HAVE A DRINK CONTAINING ALCOHOL: NEVER
HOW MANY STANDARD DRINKS CONTAINING ALCOHOL DO YOU HAVE ON A TYPICAL DAY: PATIENT DOES NOT DRINK

## 2024-04-17 ASSESSMENT — PAIN DESCRIPTION - DESCRIPTORS: DESCRIPTORS: SHARP;STABBING

## 2024-04-17 NOTE — DISCHARGE INSTRUCTIONS
You may take acetaminophen (Tylenol) 2 tablets every 6 hours as well as an anti-inflammatory (Celebrex aka celecoxib) every 12 hours as needed for pain. Taking both the Tylenol as well as anti-inflammatory medications in combination can be especially effective.     You can take your prescribed narcotic pain medicine as well if your pain is not controlled. However, keep in mind that each tablet of your medication contains 1 tablet's worth of Tylenol (325mg) and you should not take more than 4000mg of Tylenol in 24 hours.    Make sure to not drive or operate heavy machinery while taking narcotic pain medications.    Return to the Emergency Department if you experience worsening pain, numbness, tingling, change of color in your toes or fingers, or any other concerning symptoms.

## 2024-04-17 NOTE — ED PROVIDER NOTES
Decision-making details documented in ED Course.    Risk  Prescription drug management.  Parenteral controlled substances.  Diagnosis or treatment significantly limited by social determinants of health.        ED Course as of 04/17/24 1143   Wed Apr 17, 2024   0826 Blood, Urine(!): SMALL [RS]   0826 Hemoglobin Quant(!): 16.7 [RS]   0826 BUN,BUNPL: 9 [RS]   0826 Creatinine: 0.72 [RS]   0827 US RETROPERITONEAL COMPLETE  No  hydronephrosis. 4 mm nonobstructing right upper pole renal calculus. No renal  mass.    [RS]   0916 RBC, UA: 0-5  Low suspicion for ureterolithiasis [RS]   0946 Xrs independently viewed, no evidence of SBO or free air and no fx, per my read [RS]   0947 XR ABDOMEN (KUB) (SINGLE AP VIEW)  Small nonobstructing renal calculi on recent CT are not  radiographically apparent. No acute abnormality.   [RS]   0947 XR LUMBAR SPINE (MIN 4 VIEWS)  Progressive degenerative disc change at L4-5 and facet degenerative  changes at L3-4 through L5-S1 since 2014.   [RS]   0959 Pt feeling improved, but some residual pain. Requests walker to assist with ambulation at home, as she occasionally feels unsteady.     Discussed findings, likely diagnosis, likely course, symptomatic management w/ OTC +/- Rx meds, need for follow-up, and return precautions with patient and  at bedside [RS]      ED Course User Index  [RS] Elie Faria MD           CONSULTS:  None    PROCEDURES:  Unless otherwise noted below, none     Procedures      FINAL IMPRESSION      1. Lumbar radiculopathy    2. Diverticulitis of colon    3. DDD (degenerative disc disease), lumbar    4. Facet arthropathy, lumbar          DISPOSITION/PLAN   DISPOSITION   Decision To Discharge  04/17/2024 10:00:58 AM      PATIENT REFERRED TO:  Hever Caldera MD  [address] [address] [phone]    Schedule an appointment as soon as possible for a visit         DISCHARGE MEDICATIONS:  Discharge Medication List as of

## 2024-04-29 ENCOUNTER — TRANSCRIBE ORDERS (OUTPATIENT)
Dept: SCHEDULING | Age: 49
End: 2024-04-29

## 2024-04-29 DIAGNOSIS — M54.16 RADICULOPATHY, LUMBAR REGION: ICD-10-CM

## 2024-04-29 DIAGNOSIS — M54.50 LUMBAR PAIN: Primary | ICD-10-CM

## 2024-04-30 ENCOUNTER — TELEMEDICINE (OUTPATIENT)
Facility: CLINIC | Age: 49
End: 2024-04-30
Payer: COMMERCIAL

## 2024-04-30 DIAGNOSIS — G89.29 CHRONIC LEFT-SIDED LOW BACK PAIN WITH LEFT-SIDED SCIATICA: Primary | ICD-10-CM

## 2024-04-30 DIAGNOSIS — F41.9 ANXIETY: ICD-10-CM

## 2024-04-30 DIAGNOSIS — N20.1 URETEROLITHIASIS: ICD-10-CM

## 2024-04-30 DIAGNOSIS — M54.42 CHRONIC LEFT-SIDED LOW BACK PAIN WITH LEFT-SIDED SCIATICA: Primary | ICD-10-CM

## 2024-04-30 PROCEDURE — 99214 OFFICE O/P EST MOD 30 MIN: CPT | Performed by: FAMILY MEDICINE

## 2024-04-30 RX ORDER — METHYLPREDNISOLONE 4 MG/1
4 TABLET ORAL SEE ADMIN INSTRUCTIONS
COMMUNITY
Start: 2024-04-22

## 2024-04-30 RX ORDER — TENAPANOR HYDROCHLORIDE 53.2 MG/1
1 TABLET ORAL 2 TIMES DAILY
COMMUNITY
Start: 2024-04-25

## 2024-04-30 ASSESSMENT — PATIENT HEALTH QUESTIONNAIRE - PHQ9
SUM OF ALL RESPONSES TO PHQ QUESTIONS 1-9: 0
1. LITTLE INTEREST OR PLEASURE IN DOING THINGS: NOT AT ALL
SUM OF ALL RESPONSES TO PHQ9 QUESTIONS 1 & 2: 0
2. FEELING DOWN, DEPRESSED OR HOPELESS: NOT AT ALL
SUM OF ALL RESPONSES TO PHQ QUESTIONS 1-9: 0

## 2024-04-30 NOTE — PATIENT INSTRUCTIONS
When should you call for help?   Call 911 anytime you think you may need emergency care. For example, call if:    You have sudden chest pain and shortness of breath, or you cough up blood.     You are not able to stand or walk or bear weight.     Your buttocks, legs, or feet feel numb or tingly.     Your leg or foot is cool or pale or changes color.     You have severe pain.   Call your doctor now or seek immediate medical care if:    You have signs of infection, such as:  Increased pain, swelling, warmth, or redness in the hip area.  Red streaks leading from the hip area.  Pus draining from the hip area.  A fever.     You have signs of a blood clot, such as:  Pain in your calf, back of the knee, thigh, or groin.  Redness and swelling in your leg or groin.     You are not able to bend, straighten, or move your leg normally.     You have trouble urinating or having bowel movements.   Watch closely for changes in your health, and be sure to contact your doctor if:    You do not get better as expected.

## 2024-04-30 NOTE — PROGRESS NOTES
Shweta Greene, was evaluated through a synchronous (real-time) audio-video encounter. The patient (or guardian if applicable) is aware that this is a billable service, which includes applicable co-pays. This Virtual Visit was conducted with patient's (and/or legal guardian's) consent. Patient identification was verified, and a caregiver was present when appropriate.   The patient was located at Home: [address] [facility] [address]  Provider was located at Facility (Appt Dept): [address] [facility]  [address]  [place] [address]  Confirm you are appropriately licensed, registered, or certified to deliver care in the state where the patient is located as indicated above. If you are not or unsure, please re-schedule the visit: Yes, I confirm.     Shweta Greene (:  1975) is a Established patient, presenting virtually for evaluation of the following:    Assessment & Plan   Below is the assessment and plan developed based on review of pertinent history, physical exam, labs, studies, and medications.  1. Chronic left-sided low back pain with left-sided sciatica  -     States Celebrex is not helping so we will discontinue today.  States she was given diclofenac in the past by Dr. Hinojosa and that this was helpful.  Will provide course while awaiting MRI and further workup with Ortho.  Instructed patient not to combine anti-inflammatories and to take with food only.  Patient is able to teach back.  - Diclofenac (VOLTAREN) 50 MG EC tablet; Take 1 tablet by mouth 2 times daily, Disp-60 tablet, R-0Normal  2. Ureterolithiasis  -Denies pain related to this currently.  Has seen urology per her account.  3. Anxiety  -Stable with use of sertraline.  Reports this is working well for her anxiety.    Return if symptoms worsen or fail to improve.       Subjective   HPI  Shweta Greene is a 49 y.o. female who presents for hospital follow up. She was seen in [facility] on  for L flank pain with L leg

## 2024-04-30 NOTE — PROGRESS NOTES
Chief Complaint   Patient presents with    Follow-up     Shweta Greene is a 49 y.o. female who presents for a hospital follow up.       \"Have you been to the ER, urgent care clinic since your last visit?  Hospitalized since your last visit?\"    YES - When: approximately 6 days ago.  Where and Why: [facility] due to back pain.    “Have you seen or consulted any other health care providers outside of [facility] since your last visit?”    NO

## 2024-05-02 ENCOUNTER — HOSPITAL ENCOUNTER (EMERGENCY)
Facility: HOSPITAL | Age: 49
Discharge: HOME | End: 2024-05-02
Attending: STUDENT IN AN ORGANIZED HEALTH CARE EDUCATION/TRAINING PROGRAM
Payer: COMMERCIAL

## 2024-05-02 VITALS
WEIGHT: 129.19 LBS | HEIGHT: 64 IN | HEART RATE: 98 BPM | DIASTOLIC BLOOD PRESSURE: 69 MMHG | TEMPERATURE: 98.2 F | SYSTOLIC BLOOD PRESSURE: 96 MMHG | OXYGEN SATURATION: 99 % | BODY MASS INDEX: 22.06 KG/M2 | RESPIRATION RATE: 14 BRPM

## 2024-05-02 DIAGNOSIS — M54.41 LEFT-SIDED LOW BACK PAIN WITH BILATERAL SCIATICA, UNSPECIFIED CHRONICITY: Primary | ICD-10-CM

## 2024-05-02 DIAGNOSIS — M54.42 LEFT-SIDED LOW BACK PAIN WITH BILATERAL SCIATICA, UNSPECIFIED CHRONICITY: Primary | ICD-10-CM

## 2024-05-02 PROCEDURE — 96372 THER/PROPH/DIAG INJ SC/IM: CPT

## 2024-05-02 PROCEDURE — 99284 EMERGENCY DEPT VISIT MOD MDM: CPT

## 2024-05-02 PROCEDURE — 6360000002 HC RX W HCPCS: Performed by: STUDENT IN AN ORGANIZED HEALTH CARE EDUCATION/TRAINING PROGRAM

## 2024-05-02 PROCEDURE — 6370000000 HC RX 637 (ALT 250 FOR IP): Performed by: STUDENT IN AN ORGANIZED HEALTH CARE EDUCATION/TRAINING PROGRAM

## 2024-05-02 RX ORDER — OXYCODONE HYDROCHLORIDE AND ACETAMINOPHEN 5; 325 MG/1; MG/1
1 TABLET ORAL
Status: COMPLETED | OUTPATIENT
Start: 2024-05-02 | End: 2024-05-02

## 2024-05-02 RX ORDER — CYCLOBENZAPRINE HCL 10 MG
10 TABLET ORAL 3 TIMES DAILY PRN
Qty: 21 TABLET | Refills: 0 | Status: SHIPPED | OUTPATIENT
Start: 2024-05-02 | End: 2024-05-12

## 2024-05-02 RX ORDER — KETOROLAC TROMETHAMINE 30 MG/ML
30 INJECTION, SOLUTION INTRAMUSCULAR; INTRAVENOUS ONCE
Status: COMPLETED | OUTPATIENT
Start: 2024-05-02 | End: 2024-05-02

## 2024-05-02 RX ORDER — CELECOXIB 100 MG/1
100 CAPSULE ORAL 2 TIMES DAILY
Qty: 60 CAPSULE | Refills: 0 | Status: SHIPPED | OUTPATIENT
Start: 2024-05-02

## 2024-05-02 RX ADMIN — OXYCODONE AND ACETAMINOPHEN 1 TABLET: 5; 325 TABLET ORAL at 08:45

## 2024-05-02 RX ADMIN — KETOROLAC TROMETHAMINE 30 MG: 30 INJECTION, SOLUTION INTRAMUSCULAR at 07:32

## 2024-05-02 ASSESSMENT — PAIN DESCRIPTION - ORIENTATION
ORIENTATION: RIGHT;LEFT
ORIENTATION: LEFT

## 2024-05-02 ASSESSMENT — PAIN DESCRIPTION - LOCATION
LOCATION: HIP

## 2024-05-02 ASSESSMENT — PAIN SCALES - GENERAL
PAINLEVEL_OUTOF10: 7
PAINLEVEL_OUTOF10: 8
PAINLEVEL_OUTOF10: 8

## 2024-05-02 ASSESSMENT — PAIN - FUNCTIONAL ASSESSMENT: PAIN_FUNCTIONAL_ASSESSMENT: 0-10

## 2024-05-02 NOTE — ED TRIAGE NOTES
Pt ambulates into triage area w/o gait disturbance. She presents to ER for L hip pain that she's had for two weeks and worsened last night. She also c/o BLE numbness. Pt states that she was seen by ortho and has an MRI scheduled for Monday.  States she's on celebrex and was taking Flexeril but is currently out of them.    Pt also c/o SOB that she states she's had for \"quite some time\" and ringing in her ears.  No other symptoms associated to her SOB.

## 2024-05-02 NOTE — ED PROVIDER NOTES
movements intact.      Pupils: Pupils are equal, round, and reactive to light.   Cardiovascular:      Rate and Rhythm: Normal rate and regular rhythm.   Pulmonary:      Effort: Pulmonary effort is normal.      Breath sounds: Normal breath sounds.   Chest:      Chest wall: No deformity, tenderness or edema.   Abdominal:      Palpations: Abdomen is soft.      Tenderness: There is no abdominal tenderness.   Musculoskeletal:      Cervical back: Normal range of motion and neck supple.      Lumbar back: Spasms and tenderness present. Positive left straight leg raise test.   Skin:     General: Skin is warm and dry.      Capillary Refill: Capillary refill takes less than 2 seconds.   Neurological:      General: No focal deficit present.      Mental Status: She is alert and oriented to person, place, and time.         DIAGNOSTIC RESULTS     EKG: All EKG's are interpreted by the Emergency Department Physician who either signs or Co-signs this chart in the absence of a cardiologist.        RADIOLOGY:   Non-plain film images such as CT, Ultrasound and MRI are read by the radiologist. Plain radiographic images are visualized and preliminarily interpreted by the emergency physician with the below findings:        Interpretation per the Radiologist below, if available at the time of this note:    No orders to display        LABS:  Labs Reviewed - No data to display    All other labs were within normal range or not returned as of this dictation.    EMERGENCY DEPARTMENT COURSE and DIFFERENTIAL DIAGNOSIS/MDM:   Vitals:    Vitals:    05/02/24 0646   BP: 106/69   Pulse: (!) 106   Resp: 16   Temp: 98.2 °F (36.8 °C)   TempSrc: Oral   SpO2: 99%   Weight: 58.6 kg (129 lb 3 oz)   Height: 1.626 m (5' 4\")           Medical Decision Making  Patient presenting for evaluation of ongoing left lower back pain.  Has a known history of DDD.  Current symptoms are in line with degenerative disc disease related pain as well as lower extremity

## 2024-05-08 ENCOUNTER — TELEMEDICINE (OUTPATIENT)
Facility: CLINIC | Age: 49
End: 2024-05-08
Payer: COMMERCIAL

## 2024-05-08 DIAGNOSIS — N20.1 URETEROLITHIASIS: ICD-10-CM

## 2024-05-08 DIAGNOSIS — M54.42 CHRONIC LEFT-SIDED LOW BACK PAIN WITH LEFT-SIDED SCIATICA: Primary | ICD-10-CM

## 2024-05-08 DIAGNOSIS — F41.9 ANXIETY: ICD-10-CM

## 2024-05-08 DIAGNOSIS — G89.29 CHRONIC LEFT-SIDED LOW BACK PAIN WITH LEFT-SIDED SCIATICA: Primary | ICD-10-CM

## 2024-05-08 DIAGNOSIS — K57.92 DIVERTICULITIS: ICD-10-CM

## 2024-05-08 PROCEDURE — 99214 OFFICE O/P EST MOD 30 MIN: CPT | Performed by: FAMILY MEDICINE

## 2024-05-08 RX ORDER — DICLOFENAC POTASSIUM 50 MG/1
50 TABLET, FILM COATED ORAL 2 TIMES DAILY
COMMUNITY
End: 2024-05-08 | Stop reason: SDUPTHER

## 2024-05-08 RX ORDER — DICLOFENAC POTASSIUM 50 MG/1
50 TABLET, FILM COATED ORAL 2 TIMES DAILY
Qty: 30 TABLET | Refills: 0 | Status: SHIPPED | OUTPATIENT
Start: 2024-05-08 | End: 2024-05-23

## 2024-05-08 RX ORDER — CYCLOBENZAPRINE HCL 10 MG
10 TABLET ORAL 3 TIMES DAILY PRN
Qty: 21 TABLET | Refills: 0 | Status: SHIPPED | OUTPATIENT
Start: 2024-05-08 | End: 2024-05-18

## 2024-05-08 ASSESSMENT — PATIENT HEALTH QUESTIONNAIRE - PHQ9
1. LITTLE INTEREST OR PLEASURE IN DOING THINGS: NOT AT ALL
SUM OF ALL RESPONSES TO PHQ QUESTIONS 1-9: 0
2. FEELING DOWN, DEPRESSED OR HOPELESS: NOT AT ALL
SUM OF ALL RESPONSES TO PHQ9 QUESTIONS 1 & 2: 0

## 2024-05-08 NOTE — PROGRESS NOTES
Chief Complaint   Patient presents with    Shaking     Shweta Greene is a 49 y.o. female who presents for a follow up on hand tremors.      \"Have you been to the ER, urgent care clinic since your last visit?  Hospitalized since your last visit?\"    YES - When: approximately 1  weeks ago.  Where and Why: pain. Note in chart.     “Have you seen or consulted any other health care providers outside of [facility] since your last visit?”    NO             
to abstain from dairy products and to consume clear liquids, including broth and crackers. She has been advised to rest her bowels for a period of 2 to 3 days. She has been advised to follow up with Dr. White. Should symptoms persist, she has been advised to return to the emergency room.  3. Ureterolithiasis     As noted above. No longer taking flomax. Appears to be urinating without difficulty.   4. Anxiety  -     sertraline (ZOLOFT) 50 MG tablet    Patient Instructions   Call 911 or go to the hospital right away if you  - Can't pass stool and are vomiting  - Are vomiting blood or have blood diarrhea or black, tarry diarrhea  - Have chest, neck, or shoulder pain  - Feel like you might pass out  - Have pain in your shoulder blades with nausea  - Have sudden, severe pain unlike any you have felt before  - Have a belly that is rigid, hard, and hurts to touch    Return if symptoms worsen or fail to improve.       SUBJECTIVE:      History of Present Illness  Shweta Greene is a 49 y.o. female who presents for a VV for a hospital follow up. She was seen in [facility] on 5/2 for hip pain and sciatica. She was given ketorolac, oxycodone, cyclobenzaprine, and restarted on celebrex.     The patient returned to the hospital a few days prior due to persistent hip pain, sciatica. She describes her hip as locking up, which occurs multiple times daily. She discontinued Celebrex due to gastrointestinal discomfort. He is scheduled for an orthopedic consultation on Friday and has not received injections. She was informed that a 3-month course of medication, steroids, and physical therapy would be required prior to the MRI that was formerly scheduled for Monday. She has not done PT but reports doing an in home-based physical therapy, as recommended by orthopedics. She has been prescribed a muscle relaxer, which she takes thrice daily.     Her symptoms have worsened, and she reports increased gastrointestinal discomfort, including vomiting. She

## 2024-05-08 NOTE — PATIENT INSTRUCTIONS
Call 911 or go to the hospital right away if you  - Can't pass stool and are vomiting  - Are vomiting blood or have blood diarrhea or black, tarry diarrhea  - Have chest, neck, or shoulder pain  - Feel like you might pass out  - Have pain in your shoulder blades with nausea  - Have sudden, severe pain unlike any you have felt before  - Have a belly that is rigid, hard, and hurts to touch

## 2024-05-09 ENCOUNTER — TELEPHONE (OUTPATIENT)
Facility: CLINIC | Age: 49
End: 2024-05-09

## 2024-05-09 DIAGNOSIS — G89.29 CHRONIC LEFT-SIDED LOW BACK PAIN WITH LEFT-SIDED SCIATICA: Primary | ICD-10-CM

## 2024-05-09 DIAGNOSIS — M54.42 CHRONIC LEFT-SIDED LOW BACK PAIN WITH LEFT-SIDED SCIATICA: Primary | ICD-10-CM

## 2024-05-09 NOTE — TELEPHONE ENCOUNTER
RE: DME Order Request  Received: Today  Anitra Blankenship APRN - CNP Word, Shana N, MA  Phone Number: [phone]     [facility]... I've seen her this week in a virtual. I know she's having back pain but I'm not sure I understand the rolling walker order?          Previous Messages       ----- Message -----  From: Homa Stafford MA  Sent: 5/8/2024   1:20 PM EDT  To: ZEUS Carpenter CNP  Subject: RE: DME Order Request                            Does pt need an appt first??  ----- Message -----  From: Sarah Carter  Sent: 5/8/2024  11:56 AM EDT  To: *  Subject: DME Order Request                                Anitra Schultz and associates,  patient Shweta Greene was provided with an order for a rolling walker by hospitalist Dr. Elie Faria and the order was sent to [employer] (fax: [phone]) for processing. [employer] has been unsuccessful in reaching Dr. Faria for clinical notes for this patient so the order can be processed.    Is office able to provide a new order for the rolling walker and submit it to [employer] on behalf of the patient?    Thanks,  Korina Carter CPhT  [facility] - a partner of [employer] [place] and [facility]

## 2024-05-09 NOTE — TELEPHONE ENCOUNTER
Called pt, and left a voice message, asking that she call the office back in regards to her DME request.

## 2024-05-09 NOTE — TELEPHONE ENCOUNTER
Pt returned nurse's call, states she has sciatica, left leg is totally numb most of the time, right leg starting to go numb, and hip sometimes locks up when walking, making it hard to move.  Pt requesting walker to stabilize her and prevent falling. Nicolette

## 2024-05-10 ENCOUNTER — TELEPHONE (OUTPATIENT)
Facility: CLINIC | Age: 49
End: 2024-05-10

## 2024-05-10 NOTE — TELEPHONE ENCOUNTER
Evelyn called from [facility] requesting clinical notes supporting order placed for walker be faxed to [phone].  Andrewm

## 2024-05-16 ENCOUNTER — TELEPHONE (OUTPATIENT)
Facility: CLINIC | Age: 49
End: 2024-05-16

## 2024-05-16 NOTE — TELEPHONE ENCOUNTER
Have you seen anything regarding this?      Korina Carter [facility]  Staff  Phone Number: [phone]     [facility] Nurse Pattie and Staff,    Patient: Shweta Greene : 1975 is requesting an update regarding her rolling walker order.    If you are unsure of an update. Please provide me with the name and contact information of the DME company the order was sent to and I do not mind looking into updates for you.    Thanks,  Korina Carter, Stepan  [facility] - a partner of [employer] [place] and [facility]

## 2024-05-16 NOTE — TELEPHONE ENCOUNTER
I called Korina back to let her know we have submitted medical records and order to [employer]. She said she will follow up with DME and will update patient

## 2024-06-06 ENCOUNTER — PATIENT MESSAGE (OUTPATIENT)
Facility: CLINIC | Age: 49
End: 2024-06-06

## 2024-06-06 DIAGNOSIS — G89.29 CHRONIC LEFT-SIDED LOW BACK PAIN WITH LEFT-SIDED SCIATICA: ICD-10-CM

## 2024-06-06 DIAGNOSIS — M54.42 CHRONIC LEFT-SIDED LOW BACK PAIN WITH LEFT-SIDED SCIATICA: ICD-10-CM

## 2024-06-06 RX ORDER — CYCLOBENZAPRINE HCL 10 MG
10 TABLET ORAL 3 TIMES DAILY PRN
Qty: 21 TABLET | Refills: 0 | Status: SHIPPED | OUTPATIENT
Start: 2024-06-06 | End: 2024-06-16

## 2024-06-06 NOTE — TELEPHONE ENCOUNTER
From: Shweta Greene  To: Anitra Blankenship  Sent: 6/6/2024 12:06 PM EDT  Subject: Medicine refills    My pharmacy has been contacting you in reference to some medicine i need refilled I am almost out. I need the flexiril, and the voltran

## 2024-06-07 ENCOUNTER — TELEPHONE (OUTPATIENT)
Facility: CLINIC | Age: 49
End: 2024-06-07

## 2024-06-07 NOTE — TELEPHONE ENCOUNTER
This was sent to  [employer].  thanks    Korina Carter [facility]  Staff  Phone Number: [phone]     ZEUS Edwards-CNP and staff    This patient has a rolling walker order at [employer] - could you gujuju kindly send [employer] the signed DMAS Certificate of Medical Necessity form so that [employer] (fax # [phone]) can ship this item to the patient?    Thanks,    Korina Carter CPhT  [facility] - a partner of [facility] and [employer] [place]

## 2024-06-24 DIAGNOSIS — G89.29 CHRONIC LEFT-SIDED LOW BACK PAIN WITH LEFT-SIDED SCIATICA: ICD-10-CM

## 2024-06-24 DIAGNOSIS — M54.42 CHRONIC LEFT-SIDED LOW BACK PAIN WITH LEFT-SIDED SCIATICA: ICD-10-CM

## 2024-06-24 NOTE — TELEPHONE ENCOUNTER
[facility] faxed refill request:    Cyclobenzaprine 10 mg Tablets  Sig:  Take 1 tablet by mouth 3 times a day as needed for muscle spasms.  Quantity: 21

## 2024-06-25 RX ORDER — CYCLOBENZAPRINE HCL 10 MG
10 TABLET ORAL 3 TIMES DAILY PRN
Qty: 21 TABLET | Refills: 0 | Status: SHIPPED | OUTPATIENT
Start: 2024-06-25 | End: 2024-07-05

## 2024-08-02 SDOH — HEALTH STABILITY: PHYSICAL HEALTH: ON AVERAGE, HOW MANY MINUTES DO YOU ENGAGE IN EXERCISE AT THIS LEVEL?: 0 MIN

## 2024-08-02 SDOH — HEALTH STABILITY: PHYSICAL HEALTH: ON AVERAGE, HOW MANY DAYS PER WEEK DO YOU ENGAGE IN MODERATE TO STRENUOUS EXERCISE (LIKE A BRISK WALK)?: 0 DAYS

## 2024-08-05 ENCOUNTER — OFFICE VISIT (OUTPATIENT)
Dept: PRIMARY CARE CLINIC | Facility: CLINIC | Age: 49
End: 2024-08-05
Payer: COMMERCIAL

## 2024-08-05 VITALS
WEIGHT: 131.8 LBS | HEART RATE: 97 BPM | BODY MASS INDEX: 21.96 KG/M2 | SYSTOLIC BLOOD PRESSURE: 114 MMHG | DIASTOLIC BLOOD PRESSURE: 82 MMHG | TEMPERATURE: 98.2 F | HEIGHT: 65 IN | OXYGEN SATURATION: 98 % | RESPIRATION RATE: 16 BRPM

## 2024-08-05 DIAGNOSIS — R11.0 NAUSEA: ICD-10-CM

## 2024-08-05 DIAGNOSIS — E11.9 CONTROLLED TYPE 2 DIABETES MELLITUS WITHOUT COMPLICATION, WITHOUT LONG-TERM CURRENT USE OF INSULIN (HCC): ICD-10-CM

## 2024-08-05 DIAGNOSIS — K58.1 IRRITABLE BOWEL SYNDROME WITH CONSTIPATION: ICD-10-CM

## 2024-08-05 DIAGNOSIS — K21.9 GASTROESOPHAGEAL REFLUX DISEASE, UNSPECIFIED WHETHER ESOPHAGITIS PRESENT: ICD-10-CM

## 2024-08-05 DIAGNOSIS — R05.1 ACUTE COUGH: Primary | ICD-10-CM

## 2024-08-05 DIAGNOSIS — N20.1 URETEROLITHIASIS: ICD-10-CM

## 2024-08-05 DIAGNOSIS — M53.86 SCIATICA ASSOCIATED WITH DISORDER OF LUMBAR SPINE: ICD-10-CM

## 2024-08-05 DIAGNOSIS — H93.13 BILATERAL TINNITUS: ICD-10-CM

## 2024-08-05 PROCEDURE — 99215 OFFICE O/P EST HI 40 MIN: CPT | Performed by: FAMILY MEDICINE

## 2024-08-05 RX ORDER — OMEPRAZOLE 40 MG/1
40 CAPSULE, DELAYED RELEASE ORAL 2 TIMES DAILY
Qty: 180 CAPSULE | Refills: 3 | Status: SHIPPED | OUTPATIENT
Start: 2024-08-05

## 2024-08-05 RX ORDER — GABAPENTIN 400 MG/1
400 CAPSULE ORAL 3 TIMES DAILY
Qty: 90 CAPSULE | Refills: 3 | Status: SHIPPED | OUTPATIENT
Start: 2024-08-05 | End: 2024-12-03

## 2024-08-05 RX ORDER — PROMETHAZINE HYDROCHLORIDE 25 MG/1
25 TABLET ORAL EVERY 8 HOURS PRN
Qty: 60 TABLET | Refills: 5 | Status: SHIPPED | OUTPATIENT
Start: 2024-08-05 | End: 2024-12-03

## 2024-08-05 RX ORDER — LEVOFLOXACIN 500 MG/1
500 TABLET, FILM COATED ORAL DAILY
Qty: 7 TABLET | Refills: 0 | Status: SHIPPED | OUTPATIENT
Start: 2024-08-05 | End: 2024-08-09

## 2024-08-05 RX ORDER — PLECANATIDE 3 MG/1
1 TABLET ORAL DAILY
Qty: 30 TABLET | Refills: 5 | Status: SHIPPED | OUTPATIENT
Start: 2024-08-05

## 2024-08-05 RX ORDER — ONDANSETRON 4 MG/1
8 TABLET, ORALLY DISINTEGRATING ORAL EVERY 8 HOURS PRN
Qty: 60 TABLET | Refills: 5 | Status: SHIPPED | OUTPATIENT
Start: 2024-08-05

## 2024-08-05 NOTE — PROGRESS NOTES
Subjective  Chief Complaint   Patient presents with    New Patient     Establish / ortho ref.      HPI:  Shweta Greene is a 49 y.o. female.    Patient presents to establish new PCP.    Patient presents with over a week of flank pain, dysuria as well as cough.  She has had 4-month history of bilateral kidney stones with repeat UTIs.    Bilateral kidney stents-present for the last few months, she has not seen urology yet.  She request referral to urology.    Chronic nausea-unsure if it is due to her GERD, history of gastric bypass or IBS.  Some control with Zofran 4 mg, which she has had to increase to 2 pills.    Sciatica-patient reports history of chronic back pain from when she was heavier.  Over the last 6 months she has had worsening sciatica mainly on the left side but also somewhat present right.  She is following with Ortho, and is scheduled to have an MRI in the future.  She is having difficulty working due to the symptoms.    Diabetes 2-1 well-controlled following gastric bypass 8 years ago.    Tinnitus/hearing loss-patient reports long history of hearing loss as well as newer bilateral tinnitus over the last 4 months.    IBS-C-patient reports failure with Amitiza in the past.  Linzess causes diarrhea.    Past Medical History:   Diagnosis Date    Arrhythmia     PAC    Arthritis     Cellulitis     cellulitis    Chest pain 2015    cardiac cath, cleared    Diabetes (HCC)     A1C in the 5's.  no longer needs meds, lost 150 lbs.    GERD (gastroesophageal reflux disease)     H/O seasonal allergies     Headache     Hearing loss 7/10/2020    Ill-defined condition     \"twisted\" kidney right    Lyme disease     Obesity     Plantar fasciitis     PUD (peptic ulcer disease)     Trigger finger, right index finger 2/2016    Vitamin D deficiency      Current Outpatient Medications on File Prior to Visit   Medication Sig Dispense Refill    sertraline (ZOLOFT) 50 MG tablet Take 1 tablet by mouth daily 90 tablet 1     No current 
\"Have you been to the ER, urgent care clinic since your last visit?  Hospitalized since your last visit?\"    04/2024 Yes/ Back Pain    “Have you seen or consulted any other health care providers outside of [facility] since your last visit?”    NO            
tenderness to palpation of the lower lumbar spine.  Neurological: pt is alert and oriented to person, place, and time. Alert. Normal strength. No cranial nerve deficit or sensory deficit.     ASSESSMENT & PLAN:  Cough and congestion, acute  Been having symptoms for one week. Will prescribe Levofloxacin 500 mg PO once a day for one week.     Chronic nausea and vomiting  Has been dealing with this for several months. Takes Zofran, but still feels the nausea and vomits in the mornings. Will prescribe 25 mg of Phenergan 1 tablet every 8 hours as needed. Advised patient to also take two 4 mg tablets of her Zofran every 8 hours as needed as well. Patient has no history of prolonged QT per previous EKG.     History of IBS-C  Patient previously on Linzess 72 mcg. Will start patient on Trulance 3 mg tablets daily for abdominal pain and diarrhea control, can take with or without food. If this does not ease her symptoms, patient was told we would start her back on Linzess. Patient also reports she took a few trial doses of Amitiza which did not ease her symptoms in the past.     UTI  Patient with one week history of dysuria. Patient already sent a script for Levofloxacin 500 mg PO once a day for one week, which will hopefully help aid in treating her dysuria.     Ureterolithiasis, chronic issue  Patient has had a several month history of kidney stones that do not pass. Flomax was not helpful. Patient advised to follow up with Dr. Mcdonough for further evaluation and treatment.     GERD  Patient on Omeprazole 40 mg BID for symptom control, will continue at this time.    Tinnitus of the bilateral ears  Patient advised to f/u with audiology for further evaluation and possible hearing aid placement. She has a family members who also have hearing aids for similar complaints. Her Tms and external auditory canals bilaterally in the clinic today appear unremarkable.     Sciatica and DDD of the lumbar spine  Patient has an appointment with

## 2024-08-05 NOTE — ASSESSMENT & PLAN NOTE
Not fully controlled with Zofran 4 mg.  I will increase to 8 mg.  I will give promethazine for breakthrough nausea.

## 2024-08-05 NOTE — ASSESSMENT & PLAN NOTE
Persistent over the last 4 months.  Patient has not been able to see urology yet.  I will refer to Dr. Mcdonough.

## 2024-08-05 NOTE — ASSESSMENT & PLAN NOTE
Worsening over the last 6 months.  I will try gabapentin.  Patient will follow-up with Ortho for further evaluation and treatment.

## 2024-08-06 ENCOUNTER — PATIENT MESSAGE (OUTPATIENT)
Dept: PRIMARY CARE CLINIC | Facility: CLINIC | Age: 49
End: 2024-08-06

## 2024-08-06 DIAGNOSIS — F41.9 ANXIETY: ICD-10-CM

## 2024-08-06 LAB
ALBUMIN SERPL-MCNC: 4.5 G/DL (ref 3.8–4.9)
ALP SERPL-CCNC: 87 IU/L (ref 44–121)
ALT SERPL-CCNC: 22 IU/L (ref 0–32)
AST SERPL-CCNC: 33 IU/L (ref 0–40)
BASOPHILS # BLD AUTO: 0 X10E3/UL (ref 0–0.2)
BASOPHILS NFR BLD AUTO: 1 %
BILIRUB SERPL-MCNC: 0.3 MG/DL (ref 0–1.2)
BUN SERPL-MCNC: 7 MG/DL (ref 6–24)
BUN/CREAT SERPL: 10 (ref 9–23)
CALCIUM SERPL-MCNC: 9.5 MG/DL (ref 8.7–10.2)
CHLORIDE SERPL-SCNC: 100 MMOL/L (ref 96–106)
CHOLEST SERPL-MCNC: 212 MG/DL (ref 100–199)
CO2 SERPL-SCNC: 25 MMOL/L (ref 20–29)
CREAT SERPL-MCNC: 0.72 MG/DL (ref 0.57–1)
EGFRCR SERPLBLD CKD-EPI 2021: 101 ML/MIN/1.73
EOSINOPHIL # BLD AUTO: 0 X10E3/UL (ref 0–0.4)
EOSINOPHIL NFR BLD AUTO: 1 %
ERYTHROCYTE [DISTWIDTH] IN BLOOD BY AUTOMATED COUNT: 14.4 % (ref 11.7–15.4)
GLOBULIN SER CALC-MCNC: 2.9 G/DL (ref 1.5–4.5)
GLUCOSE SERPL-MCNC: 79 MG/DL (ref 70–99)
HBA1C MFR BLD: 4.9 % (ref 4.8–5.6)
HCT VFR BLD AUTO: 47.7 % (ref 34–46.6)
HDLC SERPL-MCNC: 109 MG/DL
HGB BLD-MCNC: 16.2 G/DL (ref 11.1–15.9)
IMM GRANULOCYTES # BLD AUTO: 0 X10E3/UL (ref 0–0.1)
IMM GRANULOCYTES NFR BLD AUTO: 0 %
LDLC SERPL CALC-MCNC: 87 MG/DL (ref 0–99)
LYMPHOCYTES # BLD AUTO: 1.1 X10E3/UL (ref 0.7–3.1)
LYMPHOCYTES NFR BLD AUTO: 35 %
MCH RBC QN AUTO: 34.6 PG (ref 26.6–33)
MCHC RBC AUTO-ENTMCNC: 34 G/DL (ref 31.5–35.7)
MCV RBC AUTO: 102 FL (ref 79–97)
MONOCYTES # BLD AUTO: 0.3 X10E3/UL (ref 0.1–0.9)
MONOCYTES NFR BLD AUTO: 9 %
NEUTROPHILS # BLD AUTO: 1.7 X10E3/UL (ref 1.4–7)
NEUTROPHILS NFR BLD AUTO: 54 %
PLATELET # BLD AUTO: 209 X10E3/UL (ref 150–450)
POTASSIUM SERPL-SCNC: 4.2 MMOL/L (ref 3.5–5.2)
PROT SERPL-MCNC: 7.4 G/DL (ref 6–8.5)
RBC # BLD AUTO: 4.68 X10E6/UL (ref 3.77–5.28)
SODIUM SERPL-SCNC: 142 MMOL/L (ref 134–144)
TRIGL SERPL-MCNC: 92 MG/DL (ref 0–149)
VLDLC SERPL CALC-MCNC: 16 MG/DL (ref 5–40)
WBC # BLD AUTO: 3.2 X10E3/UL (ref 3.4–10.8)

## 2024-08-06 NOTE — RESULT ENCOUNTER NOTE
Cholesterol great.  White blood cell count slightly low, which could correspond to your symptoms indicating infection.  They should improve as you feel better.  Liver, kidneys, electrolytes, sugar and protein normal.

## 2024-08-06 NOTE — TELEPHONE ENCOUNTER
Zoloft sent to [facility] pharmacy.  For sleep, have you tried trazodone before?  Another option would be to take 2 of your gabapentin at night.  Let me know what you think about those options, I have other options I can provide if those are insufficient.

## 2024-08-09 ENCOUNTER — TELEPHONE (OUTPATIENT)
Dept: PRIMARY CARE CLINIC | Facility: CLINIC | Age: 49
End: 2024-08-09

## 2024-08-09 DIAGNOSIS — J32.9 SINUSITIS, UNSPECIFIED CHRONICITY, UNSPECIFIED LOCATION: Primary | ICD-10-CM

## 2024-08-09 RX ORDER — AMOXICILLIN AND CLAVULANATE POTASSIUM 875; 125 MG/1; MG/1
1 TABLET, FILM COATED ORAL 2 TIMES DAILY
Qty: 20 TABLET | Refills: 0 | Status: SHIPPED | OUTPATIENT
Start: 2024-08-09 | End: 2024-08-19

## 2024-08-09 NOTE — TELEPHONE ENCOUNTER
Patient reports her sinusitis and UTI symptoms are still present despite treatment of levofloxacin over the last several days.  Will stop levofloxacin.  Will start Augmentin.  She has an allergy to Ceftin but has tolerated amoxicillin fine before

## 2024-08-09 NOTE — TELEPHONE ENCOUNTER
Please call patient. Patient states that medication she is taking is not helping and wants to go over other options.

## 2024-08-14 PROBLEM — N20.0 NEPHROLITHIASIS: Status: ACTIVE | Noted: 2024-02-09

## 2024-08-18 ENCOUNTER — PATIENT MESSAGE (OUTPATIENT)
Dept: PRIMARY CARE CLINIC | Facility: CLINIC | Age: 49
End: 2024-08-18

## 2024-08-18 DIAGNOSIS — K58.1 IRRITABLE BOWEL SYNDROME WITH CONSTIPATION: ICD-10-CM

## 2024-08-19 RX ORDER — PLECANATIDE 3 MG/1
1 TABLET ORAL DAILY
Qty: 30 TABLET | Refills: 5 | Status: SHIPPED | OUTPATIENT
Start: 2024-08-19 | End: 2024-08-20

## 2024-08-19 NOTE — TELEPHONE ENCOUNTER
I went and checked on the prior authorization.  And actually got this authorized back on 8/11/2024.  Here is the message:     PA Case ID #: [id number]  Rx #: [id number]  Outcome  Approved on August 12 by [facility] 2017    I resent the medication to [facility] today.  If you continue to have problems, I am happy to send in Linzess again.  If [facility] pharmacy tells you need a prior authorization, let me know, I will call them and give them the information.

## 2024-08-20 NOTE — TELEPHONE ENCOUNTER
There are some other alternatives that I have not used before.  At this point, I would like to refill your Linzess and we can talk about other medications at your next appointment, which looks to be in 3 weeks.  What dose of the Linzess are you taking?

## 2024-08-26 ENCOUNTER — PATIENT MESSAGE (OUTPATIENT)
Dept: PRIMARY CARE CLINIC | Facility: CLINIC | Age: 49
End: 2024-08-26

## 2024-08-26 DIAGNOSIS — F41.9 ANXIETY: ICD-10-CM

## 2024-08-26 DIAGNOSIS — G25.0 ESSENTIAL TREMOR: Primary | ICD-10-CM

## 2024-08-27 ENCOUNTER — TELEPHONE (OUTPATIENT)
Dept: PRIMARY CARE CLINIC | Facility: CLINIC | Age: 49
End: 2024-08-27

## 2024-08-27 RX ORDER — PROPRANOLOL HYDROCHLORIDE 20 MG/1
20 TABLET ORAL 3 TIMES DAILY
Qty: 90 TABLET | Refills: 3 | Status: SHIPPED | OUTPATIENT
Start: 2024-08-27

## 2024-08-27 RX ORDER — SERTRALINE HYDROCHLORIDE 100 MG/1
100 TABLET, FILM COATED ORAL DAILY
Qty: 90 TABLET | Refills: 1 | Status: SHIPPED | OUTPATIENT
Start: 2024-08-27

## 2024-08-27 NOTE — TELEPHONE ENCOUNTER
----- Message from Thierry ELKINS sent at 8/27/2024  1:27 PM EDT -----  Regarding: [facility] Appointment Request  [facility] Appointment Request    Patient needs appointment for [facility] Appointment Type: Existing Condition Follow Up. 5 weeks follow up    Patient Requested Dates(s): Sept. 10  Patient Requested Time: morning  Provider Name: Jeffy Monique MD    Reason for Appointment Request: Established Patient - Available appointments did not meet patient need  --------------------------------------------------------------------------------------------------------------------------    Relationship to Patient: Self     Call Back Information: OK to leave message on voicemail  Preferred Call Back Number: Phone [phone]

## 2024-08-27 NOTE — TELEPHONE ENCOUNTER
I have sent in propranolol at 20mg to try. I wrote it for three times a day, but you can try twice a day first to see if that is enough. I have increased zoloft to 100mg.

## 2024-09-10 ENCOUNTER — OFFICE VISIT (OUTPATIENT)
Dept: PRIMARY CARE CLINIC | Facility: CLINIC | Age: 49
End: 2024-09-10
Payer: COMMERCIAL

## 2024-09-10 VITALS
HEIGHT: 65 IN | DIASTOLIC BLOOD PRESSURE: 93 MMHG | WEIGHT: 132.4 LBS | HEART RATE: 104 BPM | BODY MASS INDEX: 22.06 KG/M2 | TEMPERATURE: 98.1 F | RESPIRATION RATE: 16 BRPM | OXYGEN SATURATION: 98 % | SYSTOLIC BLOOD PRESSURE: 128 MMHG

## 2024-09-10 DIAGNOSIS — F41.9 ANXIETY: ICD-10-CM

## 2024-09-10 DIAGNOSIS — K58.1 IRRITABLE BOWEL SYNDROME WITH CONSTIPATION: Primary | ICD-10-CM

## 2024-09-10 DIAGNOSIS — R51.9 CHRONIC NONINTRACTABLE HEADACHE, UNSPECIFIED HEADACHE TYPE: ICD-10-CM

## 2024-09-10 DIAGNOSIS — S39.92XA INJURY OF COCCYX, INITIAL ENCOUNTER: ICD-10-CM

## 2024-09-10 DIAGNOSIS — G89.29 CHRONIC NONINTRACTABLE HEADACHE, UNSPECIFIED HEADACHE TYPE: ICD-10-CM

## 2024-09-10 DIAGNOSIS — N20.0 NEPHROLITHIASIS: ICD-10-CM

## 2024-09-10 DIAGNOSIS — M53.86 SCIATICA ASSOCIATED WITH DISORDER OF LUMBAR SPINE: ICD-10-CM

## 2024-09-10 DIAGNOSIS — N30.00 ACUTE CYSTITIS WITHOUT HEMATURIA: ICD-10-CM

## 2024-09-10 DIAGNOSIS — G25.0 ESSENTIAL TREMOR: ICD-10-CM

## 2024-09-10 PROCEDURE — 99214 OFFICE O/P EST MOD 30 MIN: CPT | Performed by: FAMILY MEDICINE

## 2024-09-10 RX ORDER — CEPHALEXIN 500 MG/1
500 CAPSULE ORAL 2 TIMES DAILY
Qty: 14 CAPSULE | Refills: 0 | Status: SHIPPED | OUTPATIENT
Start: 2024-09-10 | End: 2024-09-17

## 2024-09-10 RX ORDER — PROPRANOLOL HYDROCHLORIDE 40 MG/1
40 TABLET ORAL 3 TIMES DAILY
Qty: 90 TABLET | Refills: 2 | Status: SHIPPED | OUTPATIENT
Start: 2024-09-10

## 2024-09-10 RX ORDER — GABAPENTIN 600 MG/1
600 TABLET ORAL 3 TIMES DAILY
Qty: 90 TABLET | Refills: 3 | Status: SHIPPED | OUTPATIENT
Start: 2024-09-10 | End: 2025-01-08

## 2024-09-10 RX ORDER — LACTULOSE 10 G/15ML
10 SOLUTION ORAL EVERY EVENING
Qty: 946 ML | Refills: 4 | Status: SHIPPED | OUTPATIENT
Start: 2024-09-10

## 2024-09-10 RX ORDER — BUSPIRONE HYDROCHLORIDE 15 MG/1
15 TABLET ORAL 3 TIMES DAILY
Qty: 90 TABLET | Refills: 3 | Status: SHIPPED | OUTPATIENT
Start: 2024-09-10

## 2024-09-11 ENCOUNTER — PATIENT MESSAGE (OUTPATIENT)
Dept: PRIMARY CARE CLINIC | Facility: CLINIC | Age: 49
End: 2024-09-11

## 2024-09-12 RX ORDER — CLOTRIMAZOLE 1 %
CREAM (GRAM) TOPICAL
Qty: 28 G | Refills: 1 | Status: SHIPPED | OUTPATIENT
Start: 2024-09-12 | End: 2024-09-19

## 2024-10-03 ENCOUNTER — PATIENT MESSAGE (OUTPATIENT)
Dept: PRIMARY CARE CLINIC | Facility: CLINIC | Age: 49
End: 2024-10-03

## 2024-10-03 DIAGNOSIS — G89.29 CHRONIC BACK PAIN, UNSPECIFIED BACK LOCATION, UNSPECIFIED BACK PAIN LATERALITY: Primary | ICD-10-CM

## 2024-10-03 DIAGNOSIS — M54.9 CHRONIC BACK PAIN, UNSPECIFIED BACK LOCATION, UNSPECIFIED BACK PAIN LATERALITY: Primary | ICD-10-CM

## 2024-10-03 DIAGNOSIS — N30.00 ACUTE CYSTITIS WITHOUT HEMATURIA: ICD-10-CM

## 2024-10-03 RX ORDER — CYCLOBENZAPRINE HCL 10 MG
10 TABLET ORAL 3 TIMES DAILY PRN
Qty: 90 TABLET | Refills: 2 | Status: SHIPPED | OUTPATIENT
Start: 2024-10-03 | End: 2025-01-01

## 2024-10-03 RX ORDER — CEPHALEXIN 500 MG/1
500 CAPSULE ORAL 2 TIMES DAILY
Qty: 14 CAPSULE | Refills: 0 | Status: SHIPPED | OUTPATIENT
Start: 2024-10-03 | End: 2024-10-10

## 2024-10-03 NOTE — TELEPHONE ENCOUNTER
I sent a new prescription in for the cephalexin which you took last time. I sent in cyclobenzaprine as well.

## 2024-10-17 ENCOUNTER — TELEPHONE (OUTPATIENT)
Dept: PRIMARY CARE CLINIC | Facility: CLINIC | Age: 49
End: 2024-10-17

## 2024-10-17 NOTE — TELEPHONE ENCOUNTER
----- Message from Bernarda KIAH sent at 10/17/2024 11:21 AM EDT -----  Regarding: [facility] Appointment Request  [facility] Appointment Request    Patient needs appointment for [facility] Appointment Type: Existing Condition Follow Up.    Patient Requested Dates(s): October 21, 2024 or sooner available    Patient Requested Time: morning   Provider Name: Jeffy Monique MD    Reason for Appointment Request: The pt wanted to switched from office visit to virtual. Current appointment will be on October 21, 2024  --------------------------------------------------------------------------------------------------------------------------    Relationship to Patient: Self     Call Back Information: OK to leave message on voicemail  Preferred Call Back Number: [phone]

## 2024-11-18 ENCOUNTER — TELEPHONE (OUTPATIENT)
Dept: PRIMARY CARE CLINIC | Facility: CLINIC | Age: 49
End: 2024-11-18

## 2024-11-18 NOTE — TELEPHONE ENCOUNTER
Sent patient scheduling ticket through [employer] did advise if patient is having issues with it to give us a call.

## 2024-11-18 NOTE — TELEPHONE ENCOUNTER
----- Message from Amando ARANDA sent at 11/18/2024 10:42 AM EST -----  Regarding: [facility] Appointment Request  [facility] Appointment Request    Patient needs appointment for [facility] Appointment Type: ED Follow-Up.    Patient Requested Dates(s): November 18, 19, 20, 21, 22 of 2024  Patient Requested Time: Morning time  Provider Name: Jeffy Monique     Reason for Appointment Request: Established Patient - Available appointments did not meet patient need  --------------------------------------------------------------------------------------------------------------------------    Relationship to Patient: Self     Call Back Information: OK to leave message on voicemail  Preferred Call Back Number: Phone  [phone]

## 2024-11-19 ENCOUNTER — OFFICE VISIT (OUTPATIENT)
Dept: PRIMARY CARE CLINIC | Facility: CLINIC | Age: 49
End: 2024-11-19
Payer: COMMERCIAL

## 2024-11-19 VITALS
DIASTOLIC BLOOD PRESSURE: 79 MMHG | SYSTOLIC BLOOD PRESSURE: 110 MMHG | HEIGHT: 64 IN | WEIGHT: 135.8 LBS | HEART RATE: 85 BPM | TEMPERATURE: 98.3 F | BODY MASS INDEX: 23.18 KG/M2

## 2024-11-19 DIAGNOSIS — M54.6 ACUTE MIDLINE THORACIC BACK PAIN: Primary | ICD-10-CM

## 2024-11-19 DIAGNOSIS — N30.00 ACUTE CYSTITIS WITHOUT HEMATURIA: ICD-10-CM

## 2024-11-19 PROCEDURE — 99214 OFFICE O/P EST MOD 30 MIN: CPT | Performed by: FAMILY MEDICINE

## 2024-11-19 RX ORDER — HYDROCODONE BITARTRATE AND ACETAMINOPHEN 5; 325 MG/1; MG/1
1 TABLET ORAL EVERY 6 HOURS PRN
Qty: 20 TABLET | Refills: 0 | Status: SHIPPED | OUTPATIENT
Start: 2024-11-19 | End: 2024-11-24

## 2024-11-19 RX ORDER — CEPHALEXIN 500 MG/1
500 CAPSULE ORAL 2 TIMES DAILY
Qty: 14 CAPSULE | Refills: 2 | Status: SHIPPED | OUTPATIENT
Start: 2024-11-19 | End: 2024-12-10

## 2024-11-19 SDOH — ECONOMIC STABILITY: FOOD INSECURITY: WITHIN THE PAST 12 MONTHS, YOU WORRIED THAT YOUR FOOD WOULD RUN OUT BEFORE YOU GOT MONEY TO BUY MORE.: NEVER TRUE

## 2024-11-19 SDOH — ECONOMIC STABILITY: FOOD INSECURITY: WITHIN THE PAST 12 MONTHS, THE FOOD YOU BOUGHT JUST DIDN'T LAST AND YOU DIDN'T HAVE MONEY TO GET MORE.: NEVER TRUE

## 2024-11-19 SDOH — ECONOMIC STABILITY: INCOME INSECURITY: HOW HARD IS IT FOR YOU TO PAY FOR THE VERY BASICS LIKE FOOD, HOUSING, MEDICAL CARE, AND HEATING?: NOT VERY HARD

## 2024-11-19 NOTE — PROGRESS NOTES
\"Have you been to the ER, urgent care clinic since your last visit?  Hospitalized since your last visit?\"    NO    “Have you seen or consulted any other health care providers outside our system since your last visit?”    YES - When: approximately 5 days ago.  Where and Why: [place]/ Back Pain.      “Have you had a diabetic eye exam?”    NO     No diabetic eye exam on file

## 2024-11-19 NOTE — PROGRESS NOTES
Subjective  Chief Complaint   Patient presents with    Follow-up     er     HPI:  Shweta Greene is a 49 y.o. female.    Acute thoracic back pain-patient reports on 11/15/2024 she was attempting to lift a gate up to open it and felt a \"pop\" in her back.  Since then she has had severe pain radiating around the sides of her back.  She went to the ER where she was evaluated.  Reduction of her T8 vertebral height was noted, but otherwise thoracic, lumbar and abdominal CT were normal.  She has had some improvement with opiate pain medication.    Recurrent UTI-patient reports UTI symptoms of the last few days and would like antibiotic treatment.  Past Medical History:   Diagnosis Date    Arrhythmia     PAC    Arthritis     Cellulitis     cellulitis    Chest pain 2015    cardiac cath, cleared    Diabetes (HCC)     A1C in the 5's.  no longer needs meds, lost 150 lbs.    GERD (gastroesophageal reflux disease)     H/O seasonal allergies     Headache     Hearing loss 7/10/2020    Ill-defined condition     \"twisted\" kidney right    Lyme disease     Obesity     Plantar fasciitis     PUD (peptic ulcer disease)     Trigger finger, right index finger 2/2016    Vitamin D deficiency      Current Outpatient Medications on File Prior to Visit   Medication Sig Dispense Refill    Tenapanor HCl 50 MG TABS Take 50 mg by mouth in the morning and at bedtime For constipation      cyclobenzaprine (FLEXERIL) 10 MG tablet Take 1 tablet by mouth 3 times daily as needed for Muscle spasms 90 tablet 2    propranolol (INDERAL) 40 MG tablet Take 1 tablet by mouth 3 times daily For hand tremor 90 tablet 2    diclofenac sodium (VOLTAREN) 1 % GEL Apply 4 g topically 4 times daily 100 g 2    gabapentin (NEURONTIN) 600 MG tablet Take 1 tablet by mouth 3 times daily for 120 days. Max Daily Amount: 1,800 mg 90 tablet 3    busPIRone (BUSPAR) 15 MG tablet Take 15 mg by mouth 3 times daily 90 tablet 3    sertraline (ZOLOFT) 100 MG tablet Take 1 tablet by mouth

## 2024-11-25 ENCOUNTER — PATIENT MESSAGE (OUTPATIENT)
Dept: PRIMARY CARE CLINIC | Facility: CLINIC | Age: 49
End: 2024-11-25

## 2024-11-25 DIAGNOSIS — M54.6 ACUTE MIDLINE THORACIC BACK PAIN: Primary | ICD-10-CM

## 2024-11-26 RX ORDER — HYDROCODONE BITARTRATE AND ACETAMINOPHEN 5; 325 MG/1; MG/1
1 TABLET ORAL EVERY 6 HOURS PRN
Qty: 20 TABLET | Refills: 0 | Status: SHIPPED | OUTPATIENT
Start: 2024-11-26 | End: 2024-12-01

## 2025-04-04 DIAGNOSIS — K21.9 GASTROESOPHAGEAL REFLUX DISEASE, UNSPECIFIED WHETHER ESOPHAGITIS PRESENT: ICD-10-CM

## 2025-04-04 NOTE — TELEPHONE ENCOUNTER
Pt called wanting   omeprazole (PRILOSEC) 40 MG  script to be sent to [facility] [place].

## 2025-04-08 RX ORDER — OMEPRAZOLE 40 MG/1
40 CAPSULE, DELAYED RELEASE ORAL 2 TIMES DAILY
Qty: 180 CAPSULE | Refills: 3 | Status: SHIPPED | OUTPATIENT
Start: 2025-04-08 | End: 2025-04-10 | Stop reason: SDUPTHER

## 2025-04-10 ENCOUNTER — TELEPHONE (OUTPATIENT)
Dept: PRIMARY CARE CLINIC | Facility: CLINIC | Age: 50
End: 2025-04-10

## 2025-04-10 DIAGNOSIS — R11.0 NAUSEA: ICD-10-CM

## 2025-04-10 DIAGNOSIS — K21.9 GASTROESOPHAGEAL REFLUX DISEASE, UNSPECIFIED WHETHER ESOPHAGITIS PRESENT: ICD-10-CM

## 2025-04-10 RX ORDER — OMEPRAZOLE 40 MG/1
40 CAPSULE, DELAYED RELEASE ORAL 2 TIMES DAILY
Qty: 180 CAPSULE | Refills: 3 | Status: SHIPPED | OUTPATIENT
Start: 2025-04-10 | End: 2025-04-10 | Stop reason: SDUPTHER

## 2025-04-10 RX ORDER — ONDANSETRON 4 MG/1
8 TABLET, ORALLY DISINTEGRATING ORAL EVERY 8 HOURS PRN
Qty: 60 TABLET | Refills: 5 | Status: SHIPPED | OUTPATIENT
Start: 2025-04-10

## 2025-04-10 RX ORDER — OMEPRAZOLE 40 MG/1
40 CAPSULE, DELAYED RELEASE ORAL 2 TIMES DAILY
Qty: 180 CAPSULE | Refills: 3 | Status: SHIPPED | OUTPATIENT
Start: 2025-04-10

## 2025-04-10 RX ORDER — OMEPRAZOLE 40 MG/1
40 CAPSULE, DELAYED RELEASE ORAL 2 TIMES DAILY
Qty: 180 CAPSULE | Refills: 3 | Status: SHIPPED | OUTPATIENT
Start: 2025-04-10 | End: 2025-04-10

## 2025-04-10 NOTE — TELEPHONE ENCOUNTER
I sent in a prescription on the following med(s):    Requested Prescriptions     Signed Prescriptions Disp Refills    omeprazole (PRILOSEC) 40 MG delayed release capsule 180 capsule 3     Sig: Take 1 capsule by mouth in the morning and at bedtime     Authorizing Provider: TERESE WHALEN        To the below pharmacy:    [id number] - [place], [place] - [address] [phone] - F [phone]  [address] [place] [address]  Phone: [phone] Fax: [phone]       Let me know if you need anything else.

## 2025-04-10 NOTE — TELEPHONE ENCOUNTER
This patient called requesting a med refill on Omeprazole 40mg to [facility]. [place]

## 2025-04-11 ENCOUNTER — TELEPHONE (OUTPATIENT)
Dept: PRIMARY CARE CLINIC | Facility: CLINIC | Age: 50
End: 2025-04-11

## 2025-04-11 NOTE — TELEPHONE ENCOUNTER
Pt would like         ondansetron (ZOFRAN-ODT) 4 MG disintegrating tablet [Dr. Jeffy Monique MD]         omeprazole (PRILOSEC) 40 MG delayed release capsule [Dr. Jeffy Monique MD]    Sent to [facility] at [address], [place] [address] [phone]

## 2025-04-15 ENCOUNTER — OFFICE VISIT (OUTPATIENT)
Dept: PRIMARY CARE CLINIC | Facility: CLINIC | Age: 50
End: 2025-04-15
Payer: COMMERCIAL

## 2025-04-15 ENCOUNTER — PATIENT MESSAGE (OUTPATIENT)
Dept: PRIMARY CARE CLINIC | Facility: CLINIC | Age: 50
End: 2025-04-15

## 2025-04-15 VITALS
HEART RATE: 104 BPM | RESPIRATION RATE: 16 BRPM | SYSTOLIC BLOOD PRESSURE: 127 MMHG | OXYGEN SATURATION: 99 % | BODY MASS INDEX: 24.03 KG/M2 | WEIGHT: 140 LBS | TEMPERATURE: 98.1 F | DIASTOLIC BLOOD PRESSURE: 86 MMHG

## 2025-04-15 DIAGNOSIS — K58.1 IRRITABLE BOWEL SYNDROME WITH CONSTIPATION: Primary | ICD-10-CM

## 2025-04-15 DIAGNOSIS — K21.9 GASTROESOPHAGEAL REFLUX DISEASE, UNSPECIFIED WHETHER ESOPHAGITIS PRESENT: ICD-10-CM

## 2025-04-15 DIAGNOSIS — N30.00 ACUTE CYSTITIS WITHOUT HEMATURIA: ICD-10-CM

## 2025-04-15 DIAGNOSIS — M54.9 CHRONIC BACK PAIN, UNSPECIFIED BACK LOCATION, UNSPECIFIED BACK PAIN LATERALITY: Primary | ICD-10-CM

## 2025-04-15 DIAGNOSIS — R11.0 NAUSEA: ICD-10-CM

## 2025-04-15 DIAGNOSIS — E11.9 CONTROLLED TYPE 2 DIABETES MELLITUS WITHOUT COMPLICATION, WITHOUT LONG-TERM CURRENT USE OF INSULIN: ICD-10-CM

## 2025-04-15 DIAGNOSIS — G25.0 ESSENTIAL TREMOR: ICD-10-CM

## 2025-04-15 DIAGNOSIS — G89.29 CHRONIC BACK PAIN, UNSPECIFIED BACK LOCATION, UNSPECIFIED BACK PAIN LATERALITY: Primary | ICD-10-CM

## 2025-04-15 DIAGNOSIS — M53.86 SCIATICA ASSOCIATED WITH DISORDER OF LUMBAR SPINE: ICD-10-CM

## 2025-04-15 DIAGNOSIS — H93.13 BILATERAL TINNITUS: ICD-10-CM

## 2025-04-15 DIAGNOSIS — F41.9 ANXIETY: ICD-10-CM

## 2025-04-15 PROCEDURE — 3044F HG A1C LEVEL LT 7.0%: CPT | Performed by: FAMILY MEDICINE

## 2025-04-15 PROCEDURE — 99214 OFFICE O/P EST MOD 30 MIN: CPT | Performed by: FAMILY MEDICINE

## 2025-04-15 RX ORDER — CEPHALEXIN 500 MG/1
500 CAPSULE ORAL 2 TIMES DAILY
Qty: 14 CAPSULE | Refills: 0 | Status: SHIPPED | OUTPATIENT
Start: 2025-04-15 | End: 2025-04-22

## 2025-04-15 RX ORDER — SERTRALINE HYDROCHLORIDE 100 MG/1
100 TABLET, FILM COATED ORAL DAILY
Qty: 90 TABLET | Refills: 1 | Status: SHIPPED | OUTPATIENT
Start: 2025-04-15

## 2025-04-15 RX ORDER — PREDNISONE 20 MG/1
20 TABLET ORAL 2 TIMES DAILY
Qty: 10 TABLET | Refills: 0 | Status: SHIPPED | OUTPATIENT
Start: 2025-04-15 | End: 2025-04-20

## 2025-04-15 RX ORDER — HYDROCODONE BITARTRATE AND ACETAMINOPHEN 5; 325 MG/1; MG/1
1 TABLET ORAL EVERY 6 HOURS PRN
Qty: 12 TABLET | Refills: 0 | Status: SHIPPED | OUTPATIENT
Start: 2025-04-15 | End: 2025-04-18

## 2025-04-15 RX ORDER — OMEPRAZOLE 40 MG/1
40 CAPSULE, DELAYED RELEASE ORAL 2 TIMES DAILY
Qty: 180 CAPSULE | Refills: 3 | Status: SHIPPED | OUTPATIENT
Start: 2025-04-15

## 2025-04-15 RX ORDER — ONDANSETRON 4 MG/1
8 TABLET, ORALLY DISINTEGRATING ORAL EVERY 8 HOURS PRN
Qty: 60 TABLET | Refills: 5 | Status: SHIPPED | OUTPATIENT
Start: 2025-04-15

## 2025-04-15 RX ORDER — BUSPIRONE HYDROCHLORIDE 30 MG/1
30 TABLET ORAL 2 TIMES DAILY
Qty: 180 TABLET | Refills: 3 | Status: SHIPPED | OUTPATIENT
Start: 2025-04-15

## 2025-04-15 RX ORDER — GABAPENTIN 800 MG/1
800 TABLET ORAL 3 TIMES DAILY
Qty: 270 TABLET | Refills: 1 | Status: SHIPPED | OUTPATIENT
Start: 2025-04-15 | End: 2025-10-12

## 2025-04-15 RX ORDER — PROPRANOLOL HYDROCHLORIDE 40 MG/1
40 TABLET ORAL 3 TIMES DAILY
Qty: 90 TABLET | Refills: 2 | Status: SHIPPED | OUTPATIENT
Start: 2025-04-15

## 2025-04-15 SDOH — ECONOMIC STABILITY: FOOD INSECURITY: WITHIN THE PAST 12 MONTHS, THE FOOD YOU BOUGHT JUST DIDN'T LAST AND YOU DIDN'T HAVE MONEY TO GET MORE.: OFTEN TRUE

## 2025-04-15 SDOH — ECONOMIC STABILITY: FOOD INSECURITY: WITHIN THE PAST 12 MONTHS, YOU WORRIED THAT YOUR FOOD WOULD RUN OUT BEFORE YOU GOT MONEY TO BUY MORE.: OFTEN TRUE

## 2025-04-15 ASSESSMENT — PATIENT HEALTH QUESTIONNAIRE - PHQ9
1. LITTLE INTEREST OR PLEASURE IN DOING THINGS: NOT AT ALL
SUM OF ALL RESPONSES TO PHQ QUESTIONS 1-9: 0
2. FEELING DOWN, DEPRESSED OR HOPELESS: NOT AT ALL
SUM OF ALL RESPONSES TO PHQ QUESTIONS 1-9: 0

## 2025-04-15 NOTE — PROGRESS NOTES
\"Have you been to the ER, urgent care clinic since your last visit?  Hospitalized since your last visit?\"    NO    “Have you seen or consulted any other health care providers outside our system since your last visit?”    NO    Have you had a mammogram?”   NO    Date of last Mammogram: 11/20/2023       “Have you had a diabetic eye exam?”    NO     No diabetic eye exam on file

## 2025-04-16 LAB
ALBUMIN SERPL-MCNC: 4.5 G/DL (ref 3.8–4.9)
ALBUMIN/CREAT UR: 9 MG/G CREAT (ref 0–29)
ALP SERPL-CCNC: 73 IU/L (ref 44–121)
ALT SERPL-CCNC: 13 IU/L (ref 0–32)
AST SERPL-CCNC: 23 IU/L (ref 0–40)
BASOPHILS # BLD AUTO: 0.1 X10E3/UL (ref 0–0.2)
BASOPHILS NFR BLD AUTO: 2 %
BILIRUB SERPL-MCNC: 0.3 MG/DL (ref 0–1.2)
BUN SERPL-MCNC: 7 MG/DL (ref 6–24)
BUN/CREAT SERPL: 9 (ref 9–23)
CALCIUM SERPL-MCNC: 9.9 MG/DL (ref 8.7–10.2)
CHLORIDE SERPL-SCNC: 105 MMOL/L (ref 96–106)
CHOLEST SERPL-MCNC: 242 MG/DL (ref 100–199)
CO2 SERPL-SCNC: 24 MMOL/L (ref 20–29)
CREAT SERPL-MCNC: 0.74 MG/DL (ref 0.57–1)
CREAT UR-MCNC: 192.4 MG/DL
EGFRCR SERPLBLD CKD-EPI 2021: 97 ML/MIN/1.73
EOSINOPHIL # BLD AUTO: 0.1 X10E3/UL (ref 0–0.4)
EOSINOPHIL NFR BLD AUTO: 4 %
ERYTHROCYTE [DISTWIDTH] IN BLOOD BY AUTOMATED COUNT: 13 % (ref 11.7–15.4)
GLOBULIN SER CALC-MCNC: 2.6 G/DL (ref 1.5–4.5)
GLUCOSE SERPL-MCNC: 88 MG/DL (ref 70–99)
HBA1C MFR BLD: 5.1 % (ref 4.8–5.6)
HCT VFR BLD AUTO: 45.6 % (ref 34–46.6)
HDLC SERPL-MCNC: 111 MG/DL
HGB BLD-MCNC: 15.5 G/DL (ref 11.1–15.9)
IMM GRANULOCYTES # BLD AUTO: 0 X10E3/UL (ref 0–0.1)
IMM GRANULOCYTES NFR BLD AUTO: 0 %
LDLC SERPL CALC-MCNC: 105 MG/DL (ref 0–99)
LYMPHOCYTES # BLD AUTO: 1 X10E3/UL (ref 0.7–3.1)
LYMPHOCYTES NFR BLD AUTO: 30 %
MCH RBC QN AUTO: 33.2 PG (ref 26.6–33)
MCHC RBC AUTO-ENTMCNC: 34 G/DL (ref 31.5–35.7)
MCV RBC AUTO: 98 FL (ref 79–97)
MICROALBUMIN UR-MCNC: 17.2 UG/ML
MONOCYTES # BLD AUTO: 0.3 X10E3/UL (ref 0.1–0.9)
MONOCYTES NFR BLD AUTO: 10 %
NEUTROPHILS # BLD AUTO: 1.8 X10E3/UL (ref 1.4–7)
NEUTROPHILS NFR BLD AUTO: 54 %
PLATELET # BLD AUTO: 252 X10E3/UL (ref 150–450)
POTASSIUM SERPL-SCNC: 3.9 MMOL/L (ref 3.5–5.2)
PROT SERPL-MCNC: 7.1 G/DL (ref 6–8.5)
RBC # BLD AUTO: 4.67 X10E6/UL (ref 3.77–5.28)
SODIUM SERPL-SCNC: 144 MMOL/L (ref 134–144)
TRIGL SERPL-MCNC: 158 MG/DL (ref 0–149)
VLDLC SERPL CALC-MCNC: 26 MG/DL (ref 5–40)
WBC # BLD AUTO: 3.3 X10E3/UL (ref 3.4–10.8)

## 2025-04-17 ENCOUNTER — RESULTS FOLLOW-UP (OUTPATIENT)
Dept: PRIMARY CARE CLINIC | Facility: CLINIC | Age: 50
End: 2025-04-17

## 2025-04-17 NOTE — ASSESSMENT & PLAN NOTE
Patient still not fully controlled.  Will increase gabapentin.  Will obtain MRI evaluating for soft tissue disease/impingement.

## 2025-04-17 NOTE — ASSESSMENT & PLAN NOTE
Patient reports 5 months of increased mid and lower back pain following fall in November.  CT scans at that time did not show any fracture.  Patient does note sciatica.  Will obtain MRI of thoracic and lumbar spine to evaluate for soft tissue disease.

## 2025-04-17 NOTE — ASSESSMENT & PLAN NOTE
The fully controlled, partially due to running limits.  Will restart Zoloft.  Will also increase dose of BuSpar.

## 2025-04-17 NOTE — ASSESSMENT & PLAN NOTE
Patient also notes some hearing loss.  Patient was unable to follow-up with audiology/ENT in the past due to insurance.  Will refer her again for further evaluation.

## 2025-04-17 NOTE — PROGRESS NOTES
Subjective  Chief Complaint   Patient presents with    Other     Hip pain/ back pain/ stomach bloating     HPI:  Shweta Greene is a 50 y.o. female.    History of Present Illness  The patient presents for evaluation of back pain, anxiety, hearing loss, and tinnitus.    She reports that her previous medication regimen was beneficial; however, she has exhausted her supply due to not having insurance for 4 months. Pain is now present in the other hip, and a kidney infection is suspected due to a sensation akin to pinching in the back. Discomfort in the upper part of the abdomen, accompanied by bloating, is also described. Pain, which initially radiated down the right side following a lifting incident 5 months ago, has now extended to the left side, reaching down to the heel. She believes she may have 2 bulging discs. Pain continues, and a knot-like formation is noted. Recently, the hip locked up again, significantly impairing the ability to stand and causing throbbing back pain. A few hydrocodone tablets remain, and refills on all medications are requested. An increase in the gabapentin dosage is considered.    An alternative medication for anxiety is requested due to significant stress recently. Severe teeth grinding, attributed to stress, is observed, along with sudden bouts of crying. Ear pain is uncertainly related to teeth grinding. A dentist appointment is scheduled next month, and advice on preventing further teeth grinding is sought. Headaches and blurred vision are reported, with an upcoming appointment with an ophthalmologist.  A refill of Zoloft and an increase in BuSpar dosage to 30 mg are requested. Irritability and potential depression are admitted.    Tinnitus and hearing loss continue, leading to frustration due to the need for others to repeat themselves.     Zofran for nausea and a refill of omeprazole for reflux are requested. Resuming consultations with a gastroenterologist regarding stomach issues is

## 2025-04-17 NOTE — RESULT ENCOUNTER NOTE
LDL (bad cholesterol) slightly increased, likely due to decreased activity over the winter. Other labs stable. Still awaiting urine culture results.

## 2025-04-18 LAB — BACTERIA UR CULT: ABNORMAL

## 2025-04-21 ENCOUNTER — PATIENT MESSAGE (OUTPATIENT)
Dept: PRIMARY CARE CLINIC | Facility: CLINIC | Age: 50
End: 2025-04-21

## 2025-04-21 DIAGNOSIS — G89.29 CHRONIC BACK PAIN, UNSPECIFIED BACK LOCATION, UNSPECIFIED BACK PAIN LATERALITY: Primary | ICD-10-CM

## 2025-04-21 DIAGNOSIS — M54.9 CHRONIC BACK PAIN, UNSPECIFIED BACK LOCATION, UNSPECIFIED BACK PAIN LATERALITY: Primary | ICD-10-CM

## 2025-05-23 ENCOUNTER — OFFICE VISIT (OUTPATIENT)
Dept: PRIMARY CARE CLINIC | Facility: CLINIC | Age: 50
End: 2025-05-23
Payer: COMMERCIAL

## 2025-05-23 VITALS
HEIGHT: 64 IN | OXYGEN SATURATION: 95 % | HEART RATE: 73 BPM | BODY MASS INDEX: 23.97 KG/M2 | WEIGHT: 140.4 LBS | DIASTOLIC BLOOD PRESSURE: 79 MMHG | SYSTOLIC BLOOD PRESSURE: 128 MMHG | TEMPERATURE: 98.2 F

## 2025-05-23 DIAGNOSIS — G43.E09 CHRONIC MIGRAINE WITH AURA WITHOUT STATUS MIGRAINOSUS, NOT INTRACTABLE: ICD-10-CM

## 2025-05-23 DIAGNOSIS — K21.9 GASTROESOPHAGEAL REFLUX DISEASE, UNSPECIFIED WHETHER ESOPHAGITIS PRESENT: ICD-10-CM

## 2025-05-23 DIAGNOSIS — F41.9 ANXIETY: ICD-10-CM

## 2025-05-23 DIAGNOSIS — G89.29 CHRONIC BACK PAIN, UNSPECIFIED BACK LOCATION, UNSPECIFIED BACK PAIN LATERALITY: ICD-10-CM

## 2025-05-23 DIAGNOSIS — N95.2 VAGINAL ATROPHY: ICD-10-CM

## 2025-05-23 DIAGNOSIS — R11.0 NAUSEA: ICD-10-CM

## 2025-05-23 DIAGNOSIS — G25.0 ESSENTIAL TREMOR: ICD-10-CM

## 2025-05-23 DIAGNOSIS — N30.00 ACUTE CYSTITIS WITHOUT HEMATURIA: Primary | ICD-10-CM

## 2025-05-23 DIAGNOSIS — M54.9 CHRONIC BACK PAIN, UNSPECIFIED BACK LOCATION, UNSPECIFIED BACK PAIN LATERALITY: ICD-10-CM

## 2025-05-23 PROCEDURE — 99214 OFFICE O/P EST MOD 30 MIN: CPT | Performed by: FAMILY MEDICINE

## 2025-05-23 RX ORDER — CEPHALEXIN 500 MG/1
500 CAPSULE ORAL 2 TIMES DAILY
Qty: 14 CAPSULE | Refills: 0 | Status: SHIPPED | OUTPATIENT
Start: 2025-05-23 | End: 2025-05-30

## 2025-05-23 RX ORDER — PROMETHAZINE HYDROCHLORIDE 25 MG/1
25 TABLET ORAL 4 TIMES DAILY PRN
Qty: 60 TABLET | Refills: 1 | Status: SHIPPED | OUTPATIENT
Start: 2025-05-23 | End: 2025-06-22

## 2025-05-23 RX ORDER — CONJUGATED ESTROGENS 0.62 MG/G
0.5 CREAM VAGINAL DAILY
Qty: 30 G | Refills: 5 | Status: SHIPPED | OUTPATIENT
Start: 2025-05-23

## 2025-05-23 RX ORDER — CYCLOBENZAPRINE HCL 10 MG
10 TABLET ORAL 3 TIMES DAILY PRN
Qty: 270 TABLET | Refills: 1 | Status: SHIPPED | OUTPATIENT
Start: 2025-05-23 | End: 2025-11-19

## 2025-05-23 RX ORDER — PREDNISONE 20 MG/1
20 TABLET ORAL 2 TIMES DAILY
Qty: 10 TABLET | Refills: 0 | Status: SHIPPED | OUTPATIENT
Start: 2025-05-23 | End: 2025-05-28

## 2025-05-23 RX ORDER — SERTRALINE HYDROCHLORIDE 100 MG/1
200 TABLET, FILM COATED ORAL DAILY
Qty: 180 TABLET | Refills: 1 | Status: SHIPPED | OUTPATIENT
Start: 2025-05-23

## 2025-05-23 RX ORDER — HYDROCODONE BITARTRATE AND ACETAMINOPHEN 5; 325 MG/1; MG/1
1 TABLET ORAL EVERY 6 HOURS PRN
Qty: 12 TABLET | Refills: 0 | Status: SHIPPED | OUTPATIENT
Start: 2025-05-23 | End: 2025-05-26

## 2025-05-23 RX ORDER — PROPRANOLOL HYDROCHLORIDE 120 MG/1
240 CAPSULE, EXTENDED RELEASE ORAL DAILY
Qty: 180 CAPSULE | Refills: 1 | Status: SHIPPED | OUTPATIENT
Start: 2025-05-23

## 2025-05-23 RX ORDER — SUCRALFATE 1 G/1
1 TABLET ORAL 3 TIMES DAILY PRN
Qty: 120 TABLET | Refills: 3 | Status: SHIPPED | OUTPATIENT
Start: 2025-05-23

## 2025-05-23 RX ORDER — SUMATRIPTAN SUCCINATE 100 MG/1
100 TABLET ORAL DAILY PRN
Qty: 27 TABLET | Refills: 1 | Status: SHIPPED | OUTPATIENT
Start: 2025-05-23

## 2025-05-23 NOTE — PROGRESS NOTES
Have you been to the ER, urgent care clinic since your last visit?  Hospitalized since your last visit?   NO    Have you seen or consulted any other health care providers outside our system since your last visit?   NO    Have you had a mammogram?”   NO    Date of last Mammogram: 11/20/2023       “Have you had a diabetic eye exam?”    NO     No diabetic eye exam on file

## 2025-05-23 NOTE — ASSESSMENT & PLAN NOTE
Patient is to start physical therapy.  She injured herself further lifting an object last week.  Will provide steroids and short course of hydrocodone

## 2025-05-23 NOTE — ASSESSMENT & PLAN NOTE
Not fully controlled.  Will increase sertraline.  Advised patient to try holding buspirone given nausea

## 2025-05-23 NOTE — PROGRESS NOTES
Subjective  Chief Complaint   Patient presents with    Migraine     Medication for hand tremors is not working for patient. Left hand tingles a lot like numb feeling      Hip Pain     Left hip side down to leg hurt and tingles.and also lower back. Patient took last hydrocodone and needs refill on flexeril.      HPI:  Shweta Greene is a 50 y.o. female.    History of Present Illness  The patient presents for evaluation of headaches, back pain, tremors, nausea, urinary tract infection, anxiety, and GERD.    She reports experiencing frequent headaches, which she describes as akin to being struck with a baseball bat at the back of her head. She has sought ophthalmological consultation due to concurrent vision issues and has been prescribed corrective lenses. She has a history of migraine headaches, for which she previously took Imitrex. Migraines occur more than 15 days per month, often accompanied by aura, and frequently wake her up with severe headaches that necessitate lying down. She has undergone MRIs in the past, around the age of 16 or 17.    She also reports an incident of lifting an object incorrectly over the past weekend, resulting in lower back pain. She has exhausted her supply of hydrocodone and is seeking a refill of Flexeril. She is not currently taking ibuprofen.    Despite being on propranolol, she continues to experience tremors, which are severe enough to interfere with her ability to eat. She is currently on a regimen of propranolol three times daily.    She reports experiencing nausea, which she suspects may be a side effect of one of her medications. She also reports difficulty swallowing pills, which has led to choking incidents. She is scheduled to see an ENT specialist next month. She is currently taking omeprazole three times daily and is seeking an alternative medication for nausea as her current medication is ineffective.    She reports recurrent kidney issues, including malodorous urine,

## 2025-05-30 ENCOUNTER — PATIENT MESSAGE (OUTPATIENT)
Dept: PRIMARY CARE CLINIC | Facility: CLINIC | Age: 50
End: 2025-05-30

## 2025-05-30 DIAGNOSIS — F41.9 ANXIETY: Primary | ICD-10-CM

## 2025-05-30 RX ORDER — HYDROXYZINE HYDROCHLORIDE 25 MG/1
25 TABLET, FILM COATED ORAL EVERY 8 HOURS PRN
Qty: 90 TABLET | Refills: 5 | Status: SHIPPED | OUTPATIENT
Start: 2025-05-30 | End: 2025-11-26

## 2025-08-19 ENCOUNTER — TELEPHONE (OUTPATIENT)
Dept: PRIMARY CARE CLINIC | Facility: CLINIC | Age: 50
End: 2025-08-19

## 2025-09-04 ENCOUNTER — PATIENT MESSAGE (OUTPATIENT)
Dept: PRIMARY CARE CLINIC | Facility: CLINIC | Age: 50
End: 2025-09-04

## 2025-09-04 DIAGNOSIS — F41.9 ANXIETY: ICD-10-CM

## 2025-09-04 DIAGNOSIS — G89.29 CHRONIC BACK PAIN, UNSPECIFIED BACK LOCATION, UNSPECIFIED BACK PAIN LATERALITY: ICD-10-CM

## 2025-09-04 DIAGNOSIS — M54.9 CHRONIC BACK PAIN, UNSPECIFIED BACK LOCATION, UNSPECIFIED BACK PAIN LATERALITY: ICD-10-CM

## 2025-09-04 DIAGNOSIS — F41.9 ANXIETY: Primary | ICD-10-CM

## 2025-09-04 DIAGNOSIS — M53.86 SCIATICA ASSOCIATED WITH DISORDER OF LUMBAR SPINE: ICD-10-CM

## 2025-09-04 DIAGNOSIS — K21.9 GASTROESOPHAGEAL REFLUX DISEASE, UNSPECIFIED WHETHER ESOPHAGITIS PRESENT: ICD-10-CM

## 2025-09-04 DIAGNOSIS — N95.2 VAGINAL ATROPHY: ICD-10-CM

## 2025-09-04 RX ORDER — OMEPRAZOLE 40 MG/1
40 CAPSULE, DELAYED RELEASE ORAL 2 TIMES DAILY
Qty: 180 CAPSULE | Refills: 3 | Status: SHIPPED | OUTPATIENT
Start: 2025-09-04

## 2025-09-04 RX ORDER — PREDNISONE 20 MG/1
20 TABLET ORAL 2 TIMES DAILY
Qty: 10 TABLET | Refills: 0 | Status: SHIPPED | OUTPATIENT
Start: 2025-09-04 | End: 2025-09-09

## 2025-09-04 RX ORDER — CYCLOBENZAPRINE HCL 10 MG
10 TABLET ORAL 3 TIMES DAILY PRN
Qty: 270 TABLET | Refills: 1 | Status: SHIPPED | OUTPATIENT
Start: 2025-09-04 | End: 2025-09-04 | Stop reason: SDUPTHER

## 2025-09-04 RX ORDER — HYDROCODONE BITARTRATE AND ACETAMINOPHEN 5; 325 MG/1; MG/1
1 TABLET ORAL EVERY 6 HOURS PRN
Qty: 12 TABLET | Refills: 0 | Status: SHIPPED | OUTPATIENT
Start: 2025-09-04 | End: 2025-09-07

## 2025-09-04 RX ORDER — CYCLOBENZAPRINE HCL 10 MG
10 TABLET ORAL 3 TIMES DAILY PRN
Qty: 270 TABLET | Refills: 1 | Status: SHIPPED | OUTPATIENT
Start: 2025-09-04 | End: 2026-03-03

## 2025-09-04 RX ORDER — HYDROXYZINE HYDROCHLORIDE 25 MG/1
25 TABLET, FILM COATED ORAL EVERY 8 HOURS PRN
Qty: 90 TABLET | Refills: 5 | Status: SHIPPED | OUTPATIENT
Start: 2025-09-04 | End: 2026-03-03

## 2025-09-04 RX ORDER — GABAPENTIN 800 MG/1
800 TABLET ORAL 3 TIMES DAILY
Qty: 270 TABLET | Refills: 1 | Status: SHIPPED | OUTPATIENT
Start: 2025-09-04 | End: 2026-03-03

## 2025-09-04 RX ORDER — HYDROXYZINE HYDROCHLORIDE 25 MG/1
25 TABLET, FILM COATED ORAL EVERY 8 HOURS PRN
Qty: 90 TABLET | Refills: 5 | Status: SHIPPED | OUTPATIENT
Start: 2025-09-04 | End: 2025-09-04 | Stop reason: SDUPTHER

## 2025-09-04 RX ORDER — CONJUGATED ESTROGENS 0.62 MG/G
0.5 CREAM VAGINAL DAILY
Qty: 30 G | Refills: 5 | Status: SHIPPED | OUTPATIENT
Start: 2025-09-04

## (undated) DEVICE — IV STRT KT 3282] LSL INDUSTRIES INC]

## (undated) DEVICE — ELECTRODE,RADIOTRANSLUCENT,FOAM,3PK: Brand: MEDLINE

## (undated) DEVICE — SYRINGE MED 5ML STD CLR PLAS LUERLOCK TIP N CTRL DISP

## (undated) DEVICE — CANNULA CUSH AD W/ 14FT TBG

## (undated) DEVICE — BLUNTFILL WITH FILTER: Brand: MONOJECT

## (undated) DEVICE — KIT COLON W/ 1.1OZ LUB AND 2 END

## (undated) DEVICE — BLUNTFILL: Brand: MONOJECT

## (undated) DEVICE — 1200 GUARD II KIT W/5MM TUBE W/O VAC TUBE: Brand: GUARDIAN

## (undated) DEVICE — SET ADMIN 16ML TBNG L100IN 2 Y INJ SITE IV PIGGY BK DISP (ORDER IN MULIPLES OF 48)

## (undated) DEVICE — SOLIDIFIER FLD 2OZ 1500CC N DISINF IN BTL DISP SAFESORB

## (undated) DEVICE — CATHETER IV 22GA L1IN OD0.8382-0.9144MM ID0.6096-0.6858MM 382523

## (undated) DEVICE — SYRINGE MED 3ML CLR PLAS STD N CTRL LUERLOCK TIP DISP

## (undated) DEVICE — BITEBLOCK ENDOSCP 60FR MAXI WHT POLYETH STURDY W/ VELC WVN